# Patient Record
Sex: FEMALE | Race: WHITE | Employment: OTHER | URBAN - METROPOLITAN AREA
[De-identification: names, ages, dates, MRNs, and addresses within clinical notes are randomized per-mention and may not be internally consistent; named-entity substitution may affect disease eponyms.]

---

## 2017-03-06 PROBLEM — E55.9 VITAMIN D INSUFFICIENCY: Status: ACTIVE | Noted: 2017-03-06

## 2017-03-06 PROCEDURE — 82607 VITAMIN B-12: CPT | Performed by: INTERNAL MEDICINE

## 2017-05-11 PROBLEM — G47.33 OBSTRUCTIVE SLEEP APNEA: Status: ACTIVE | Noted: 2017-05-11

## 2017-10-10 PROCEDURE — 82607 VITAMIN B-12: CPT | Performed by: INTERNAL MEDICINE

## 2018-05-15 PROCEDURE — 82607 VITAMIN B-12: CPT | Performed by: INTERNAL MEDICINE

## 2019-10-29 PROBLEM — H81.10 BENIGN PAROXYSMAL POSITIONAL VERTIGO, UNSPECIFIED LATERALITY: Status: ACTIVE | Noted: 2019-10-29

## 2020-06-01 PROBLEM — I77.9 BILATERAL CAROTID ARTERY DISEASE, UNSPECIFIED TYPE (HCC): Status: ACTIVE | Noted: 2020-06-01

## 2020-06-01 PROBLEM — F33.42 MAJOR DEPRESSIVE DISORDER, RECURRENT EPISODE, IN FULL REMISSION (HCC): Status: ACTIVE | Noted: 2020-06-01

## 2020-06-01 PROBLEM — F33.42 MAJOR DEPRESSIVE DISORDER, RECURRENT EPISODE, IN FULL REMISSION: Status: ACTIVE | Noted: 2020-06-01

## 2020-06-01 PROBLEM — I77.9 BILATERAL CAROTID ARTERY DISEASE, UNSPECIFIED TYPE: Status: ACTIVE | Noted: 2020-06-01

## 2020-07-24 PROBLEM — G95.9 CERVICAL MYELOPATHY (HCC): Status: ACTIVE | Noted: 2020-07-24

## 2021-01-25 ENCOUNTER — TELEPHONE (OUTPATIENT)
Dept: FAMILY MEDICINE CLINIC | Facility: CLINIC | Age: 81
End: 2021-01-25

## 2021-01-25 DIAGNOSIS — G95.9 CERVICAL MYELOPATHY (HCC): ICD-10-CM

## 2021-01-25 DIAGNOSIS — M48.07 SPINAL STENOSIS OF LUMBOSACRAL REGION: Primary | ICD-10-CM

## 2021-01-25 DIAGNOSIS — R13.10 DYSPHAGIA, UNSPECIFIED TYPE: ICD-10-CM

## 2021-03-15 ENCOUNTER — TELEPHONE (OUTPATIENT)
Dept: FAMILY MEDICINE CLINIC | Facility: CLINIC | Age: 81
End: 2021-03-15

## 2021-03-15 DIAGNOSIS — R73.01 ELEVATED FASTING BLOOD SUGAR: Primary | ICD-10-CM

## 2021-03-15 NOTE — TELEPHONE ENCOUNTER
Surgery on 03/22/21, C5-7 cervical fusion revision with Dr. Geovanny Oconnor @ [address] [place] [address]    H&P- complete  Labs- FBS (109), A1C (5.5), Urine culture neg, AST (13), A/G ratio (0.8), TSH WNL, MRSA neg, all other labs WNL  EKG- done 09/23/20 tracing sent to scanning  X-ray- d

## 2021-03-16 ENCOUNTER — LAB ENCOUNTER (OUTPATIENT)
Dept: LAB | Facility: HOSPITAL | Age: 81
End: 2021-03-16
Attending: FAMILY MEDICINE
Payer: MEDICARE

## 2021-03-16 ENCOUNTER — OFFICE VISIT (OUTPATIENT)
Dept: FAMILY MEDICINE CLINIC | Facility: CLINIC | Age: 81
End: 2021-03-16
Payer: MEDICARE

## 2021-03-16 VITALS
WEIGHT: 189 LBS | HEART RATE: 69 BPM | HEIGHT: 64 IN | TEMPERATURE: 98 F | SYSTOLIC BLOOD PRESSURE: 138 MMHG | RESPIRATION RATE: 16 BRPM | BODY MASS INDEX: 32.27 KG/M2 | DIASTOLIC BLOOD PRESSURE: 74 MMHG | OXYGEN SATURATION: 97 %

## 2021-03-16 DIAGNOSIS — Z01.812 PRE-OPERATIVE LABORATORY EXAMINATION: ICD-10-CM

## 2021-03-16 DIAGNOSIS — N32.81 OAB (OVERACTIVE BLADDER): ICD-10-CM

## 2021-03-16 DIAGNOSIS — I10 ESSENTIAL HYPERTENSION: ICD-10-CM

## 2021-03-16 DIAGNOSIS — G95.9 CERVICAL MYELOPATHY (HCC): ICD-10-CM

## 2021-03-16 DIAGNOSIS — E55.9 VITAMIN D INSUFFICIENCY: ICD-10-CM

## 2021-03-16 DIAGNOSIS — F41.9 ANXIETY: ICD-10-CM

## 2021-03-16 DIAGNOSIS — I87.2 VENOUS INSUFFICIENCY: ICD-10-CM

## 2021-03-16 DIAGNOSIS — R13.10 DYSPHAGIA, UNSPECIFIED TYPE: ICD-10-CM

## 2021-03-16 DIAGNOSIS — D35.00 ADRENAL CORTICAL ADENOMA, UNSPECIFIED LATERALITY: ICD-10-CM

## 2021-03-16 DIAGNOSIS — G47.33 OBSTRUCTIVE SLEEP APNEA: ICD-10-CM

## 2021-03-16 DIAGNOSIS — E78.5 DYSLIPIDEMIA: ICD-10-CM

## 2021-03-16 DIAGNOSIS — M96.0 FAILED CERVICAL FUSION: Primary | ICD-10-CM

## 2021-03-16 DIAGNOSIS — M85.852 OSTEOPENIA OF LEFT THIGH: ICD-10-CM

## 2021-03-16 DIAGNOSIS — D53.1 VITAMIN B12 DEFICIENT MEGALOBLASTIC ANEMIA: ICD-10-CM

## 2021-03-16 DIAGNOSIS — M48.07 SPINAL STENOSIS OF LUMBOSACRAL REGION: ICD-10-CM

## 2021-03-16 DIAGNOSIS — E27.8 ADRENAL MASS (HCC): ICD-10-CM

## 2021-03-16 DIAGNOSIS — K21.00 GASTROESOPHAGEAL REFLUX DISEASE WITH ESOPHAGITIS WITHOUT HEMORRHAGE: ICD-10-CM

## 2021-03-16 DIAGNOSIS — H35.3130 BILATERAL NONEXUDATIVE AGE-RELATED MACULAR DEGENERATION, UNSPECIFIED STAGE: ICD-10-CM

## 2021-03-16 DIAGNOSIS — E03.9 ACQUIRED HYPOTHYROIDISM: ICD-10-CM

## 2021-03-16 DIAGNOSIS — I77.9 BILATERAL CAROTID ARTERY DISEASE, UNSPECIFIED TYPE (HCC): ICD-10-CM

## 2021-03-16 DIAGNOSIS — D80.9 IMMUNOGLOBULIN DEFICIENCY (HCC): ICD-10-CM

## 2021-03-16 DIAGNOSIS — R73.01 ELEVATED FASTING BLOOD SUGAR: ICD-10-CM

## 2021-03-16 DIAGNOSIS — J32.4 CHRONIC PANSINUSITIS: ICD-10-CM

## 2021-03-16 DIAGNOSIS — Z01.818 PREOP EXAMINATION: ICD-10-CM

## 2021-03-16 DIAGNOSIS — H81.10 BENIGN PAROXYSMAL POSITIONAL VERTIGO, UNSPECIFIED LATERALITY: ICD-10-CM

## 2021-03-16 DIAGNOSIS — F33.42 MAJOR DEPRESSIVE DISORDER, RECURRENT EPISODE, IN FULL REMISSION (HCC): ICD-10-CM

## 2021-03-16 LAB
ALBUMIN SERPL-MCNC: 3.4 G/DL (ref 3.4–5)
ALBUMIN/GLOB SERPL: 0.8 {RATIO} (ref 1–2)
ALP LIVER SERPL-CCNC: 64 U/L
ALT SERPL-CCNC: 23 U/L
ANION GAP SERPL CALC-SCNC: 11 MMOL/L (ref 0–18)
ANTIBODY SCREEN: NEGATIVE
APTT PPP: 29.7 SECONDS (ref 23.2–35.3)
AST SERPL-CCNC: 13 U/L (ref 15–37)
BASOPHILS # BLD AUTO: 0.04 X10(3) UL (ref 0–0.2)
BASOPHILS NFR BLD AUTO: 0.4 %
BILIRUB SERPL-MCNC: 0.8 MG/DL (ref 0.1–2)
BUN BLD-MCNC: 12 MG/DL (ref 7–18)
BUN/CREAT SERPL: 16.9 (ref 10–20)
CALCIUM BLD-MCNC: 9.8 MG/DL (ref 8.5–10.1)
CHLORIDE SERPL-SCNC: 102 MMOL/L (ref 98–112)
CO2 SERPL-SCNC: 26 MMOL/L (ref 21–32)
CREAT BLD-MCNC: 0.71 MG/DL
DEPRECATED RDW RBC AUTO: 41.3 FL (ref 35.1–46.3)
EOSINOPHIL # BLD AUTO: 0.03 X10(3) UL (ref 0–0.7)
EOSINOPHIL NFR BLD AUTO: 0.3 %
ERYTHROCYTE [DISTWIDTH] IN BLOOD BY AUTOMATED COUNT: 12.5 % (ref 11–15)
EST. AVERAGE GLUCOSE BLD GHB EST-MCNC: 111 MG/DL (ref 68–126)
GLOBULIN PLAS-MCNC: 4.1 G/DL (ref 2.8–4.4)
GLUCOSE BLD-MCNC: 109 MG/DL (ref 70–99)
HBA1C MFR BLD HPLC: 5.5 % (ref ?–5.7)
HCT VFR BLD AUTO: 42.4 %
HGB BLD-MCNC: 13.5 G/DL
HYALINE CASTS #/AREA URNS AUTO: PRESENT /LPF
IMM GRANULOCYTES # BLD AUTO: 0.02 X10(3) UL (ref 0–1)
IMM GRANULOCYTES NFR BLD: 0.2 %
INR BLD: 1.11 (ref 0.9–1.2)
LYMPHOCYTES # BLD AUTO: 2.07 X10(3) UL (ref 1–4)
LYMPHOCYTES NFR BLD AUTO: 20.5 %
M PROTEIN MFR SERPL ELPH: 7.5 G/DL (ref 6.4–8.2)
MCH RBC QN AUTO: 28.7 PG (ref 26–34)
MCHC RBC AUTO-ENTMCNC: 31.8 G/DL (ref 31–37)
MCV RBC AUTO: 90.2 FL
MONOCYTES # BLD AUTO: 0.95 X10(3) UL (ref 0.1–1)
MONOCYTES NFR BLD AUTO: 9.4 %
NEUTROPHILS # BLD AUTO: 6.97 X10 (3) UL (ref 1.5–7.7)
NEUTROPHILS # BLD AUTO: 6.97 X10(3) UL (ref 1.5–7.7)
NEUTROPHILS NFR BLD AUTO: 69.2 %
OSMOLALITY SERPL CALC.SUM OF ELEC: 288 MOSM/KG (ref 275–295)
PATIENT FASTING Y/N/NP: YES
PLATELET # BLD AUTO: 371 10(3)UL (ref 150–450)
POTASSIUM SERPL-SCNC: 3.5 MMOL/L (ref 3.5–5.1)
PROTHROMBIN TIME: 14.1 SECONDS (ref 11.8–14.5)
RBC # BLD AUTO: 4.7 X10(6)UL
RH BLOOD TYPE: NEGATIVE
SODIUM SERPL-SCNC: 139 MMOL/L (ref 136–145)
TSI SER-ACNC: 3.08 MIU/ML (ref 0.36–3.74)
WBC # BLD AUTO: 10.1 X10(3) UL (ref 4–11)

## 2021-03-16 PROCEDURE — 86900 BLOOD TYPING SEROLOGIC ABO: CPT

## 2021-03-16 PROCEDURE — 85730 THROMBOPLASTIN TIME PARTIAL: CPT

## 2021-03-16 PROCEDURE — 83036 HEMOGLOBIN GLYCOSYLATED A1C: CPT

## 2021-03-16 PROCEDURE — 86850 RBC ANTIBODY SCREEN: CPT

## 2021-03-16 PROCEDURE — 87086 URINE CULTURE/COLONY COUNT: CPT

## 2021-03-16 PROCEDURE — 99214 OFFICE O/P EST MOD 30 MIN: CPT | Performed by: FAMILY MEDICINE

## 2021-03-16 PROCEDURE — 81015 MICROSCOPIC EXAM OF URINE: CPT

## 2021-03-16 PROCEDURE — 85025 COMPLETE CBC W/AUTO DIFF WBC: CPT

## 2021-03-16 PROCEDURE — 84443 ASSAY THYROID STIM HORMONE: CPT

## 2021-03-16 PROCEDURE — 87081 CULTURE SCREEN ONLY: CPT

## 2021-03-16 PROCEDURE — 80053 COMPREHEN METABOLIC PANEL: CPT

## 2021-03-16 PROCEDURE — 85610 PROTHROMBIN TIME: CPT

## 2021-03-16 PROCEDURE — 86901 BLOOD TYPING SEROLOGIC RH(D): CPT

## 2021-03-16 PROCEDURE — 36415 COLL VENOUS BLD VENIPUNCTURE: CPT

## 2021-03-16 NOTE — H&P
[facility]-OP [facility]    PRE-OP NOTE    HPI:   I have been consulted by Dr. Rudy Mcdaniels to see Reagan Kumar 78year old female for a preoperative evaluation and medical clearance.  She has a long history of worsening postoperative swallowing difficult daily.  30 tablet 0   • LOSARTAN POTASSIUM 50 MG Oral Tab Take 1 tablet by mouth once daily (Patient not taking: Reported on 3/16/2021) 90 tablet 0   • LOVASTATIN 20 MG Oral Tab Take 1 tablet by mouth in the evening (Patient not taking: Reported on 3/16/202 Surgical History:   Procedure Laterality Date   • BACK SURGERY  10/05/2020    C5-7 cervical fusion with Dr. Samara Leach @ [address] [place] [address]   •      • CATARACT Bilateral     Dec 2018 (R), 2019 (L)   • CHOLECYSTECTOMY  3/19/15     Laparoscopic by Dr. Kavita Hutchison   • C Organization Meetings:       Marital Status:   Intimate Partner Violence:       Fear of Current or Ex-Partner:       Emotionally Abused:       Physically Abused:       Sexually Abused:     REVIEW OF SYSTEMS:   CONSTITUTIONAL:  Denies unusual weight gain bu skin lesion, no bruising, good turgor. HEART:  Regular rate and rhythm, no murmurs, rubs or gallops. LUNGS: Clear to auscultation bilterally, no rales/rhonchi/wheezing. CHEST: No tenderness.   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 There are no decompensated medical conditions. ASA classification 2    Medical history:  High risk surgery (vascular, thoracic, intra-peritoneal):  No  CAD: No  CHF: No  Stroke: No  DM on insulin: No  Serum Cre

## 2021-03-18 RX ORDER — FAMOTIDINE 20 MG/1
20 TABLET ORAL DAILY
Status: ON HOLD | COMMUNITY
End: 2021-05-03

## 2021-03-18 NOTE — TELEPHONE ENCOUNTER
Dr. Giovanna Gutierrez, please review:    Labs- FBS (109), A1C (5.5), Urine culture neg, AST (13), A/G ratio (0.8), TSH WNL, MRSA neg, all other labs WNL  EKG- done 09/23/20 tracing sent to scanning  X-ray- done 12/04/20    OK for surgery?

## 2021-03-19 ENCOUNTER — LAB ENCOUNTER (OUTPATIENT)
Dept: LAB | Facility: HOSPITAL | Age: 81
DRG: 857 | End: 2021-03-19
Attending: ORTHOPAEDIC SURGERY
Payer: MEDICARE

## 2021-03-19 DIAGNOSIS — Z01.818 PREOP TESTING: ICD-10-CM

## 2021-03-19 NOTE — TELEPHONE ENCOUNTER
Spoke to patient's son Avery Mcleod went over all test results with him for the patient and that she is clear for surgery per Dr. Camron Ly. Patient going for COVID testing today.  They were also advised before starting all regular medications to follow up with Dr. Camron Ly

## 2021-03-22 ENCOUNTER — ANESTHESIA (OUTPATIENT)
Dept: SURGERY | Facility: HOSPITAL | Age: 81
DRG: 857 | End: 2021-03-22
Payer: MEDICARE

## 2021-03-22 ENCOUNTER — HOSPITAL ENCOUNTER (INPATIENT)
Facility: HOSPITAL | Age: 81
LOS: 10 days | Discharge: HOME | DRG: 857 | End: 2021-04-01
Attending: ORTHOPAEDIC SURGERY | Admitting: ORTHOPAEDIC SURGERY
Payer: MEDICARE

## 2021-03-22 ENCOUNTER — APPOINTMENT (OUTPATIENT)
Dept: GENERAL RADIOLOGY | Facility: HOSPITAL | Age: 81
DRG: 857 | End: 2021-03-22
Attending: ANESTHESIOLOGY
Payer: MEDICARE

## 2021-03-22 ENCOUNTER — APPOINTMENT (OUTPATIENT)
Dept: GENERAL RADIOLOGY | Facility: HOSPITAL | Age: 81
DRG: 857 | End: 2021-03-22
Attending: ORTHOPAEDIC SURGERY
Payer: MEDICARE

## 2021-03-22 ENCOUNTER — ANESTHESIA EVENT (OUTPATIENT)
Dept: SURGERY | Facility: HOSPITAL | Age: 81
DRG: 857 | End: 2021-03-22
Payer: MEDICARE

## 2021-03-22 DIAGNOSIS — Z01.818 PREOP TESTING: Primary | ICD-10-CM

## 2021-03-22 PROCEDURE — 76000 FLUOROSCOPY <1 HR PHYS/QHP: CPT | Performed by: ORTHOPAEDIC SURGERY

## 2021-03-22 PROCEDURE — 71045 X-RAY EXAM CHEST 1 VIEW: CPT | Performed by: ANESTHESIOLOGY

## 2021-03-22 RX ORDER — SODIUM PHOSPHATE, DIBASIC AND SODIUM PHOSPHATE, MONOBASIC 7; 19 G/133ML; G/133ML
1 ENEMA RECTAL ONCE AS NEEDED
Status: DISCONTINUED | OUTPATIENT
Start: 2021-03-22 | End: 2021-04-01

## 2021-03-22 RX ORDER — ROCURONIUM BROMIDE 10 MG/ML
INJECTION, SOLUTION INTRAVENOUS AS NEEDED
Status: DISCONTINUED | OUTPATIENT
Start: 2021-03-22 | End: 2021-03-22 | Stop reason: SURG

## 2021-03-22 RX ORDER — HYDROCODONE BITARTRATE AND ACETAMINOPHEN 10; 325 MG/1; MG/1
2 TABLET ORAL EVERY 4 HOURS PRN
Status: DISCONTINUED | OUTPATIENT
Start: 2021-03-22 | End: 2021-03-29

## 2021-03-22 RX ORDER — MELATONIN
800 DAILY
Status: DISCONTINUED | OUTPATIENT
Start: 2021-03-22 | End: 2021-04-01

## 2021-03-22 RX ORDER — ESMOLOL HYDROCHLORIDE 10 MG/ML
INJECTION INTRAVENOUS AS NEEDED
Status: DISCONTINUED | OUTPATIENT
Start: 2021-03-22 | End: 2021-03-22 | Stop reason: SURG

## 2021-03-22 RX ORDER — LEVOTHYROXINE SODIUM 0.03 MG/1
25 TABLET ORAL
Status: DISCONTINUED | OUTPATIENT
Start: 2021-03-23 | End: 2021-04-01

## 2021-03-22 RX ORDER — NEOSTIGMINE METHYLSULFATE 1 MG/ML
INJECTION INTRAVENOUS AS NEEDED
Status: DISCONTINUED | OUTPATIENT
Start: 2021-03-22 | End: 2021-03-22 | Stop reason: SURG

## 2021-03-22 RX ORDER — BUPIVACAINE HYDROCHLORIDE AND EPINEPHRINE 5; 5 MG/ML; UG/ML
INJECTION, SOLUTION PERINEURAL AS NEEDED
Status: DISCONTINUED | OUTPATIENT
Start: 2021-03-22 | End: 2021-03-22 | Stop reason: HOSPADM

## 2021-03-22 RX ORDER — HYDROMORPHONE HYDROCHLORIDE 1 MG/ML
0.4 INJECTION, SOLUTION INTRAMUSCULAR; INTRAVENOUS; SUBCUTANEOUS EVERY 2 HOUR PRN
Status: DISCONTINUED | OUTPATIENT
Start: 2021-03-22 | End: 2021-03-29

## 2021-03-22 RX ORDER — HYDROCODONE BITARTRATE AND ACETAMINOPHEN 5; 325 MG/1; MG/1
2 TABLET ORAL AS NEEDED
Status: DISCONTINUED | OUTPATIENT
Start: 2021-03-22 | End: 2021-03-22 | Stop reason: HOSPADM

## 2021-03-22 RX ORDER — HYDROMORPHONE HYDROCHLORIDE 1 MG/ML
0.6 INJECTION, SOLUTION INTRAMUSCULAR; INTRAVENOUS; SUBCUTANEOUS EVERY 5 MIN PRN
Status: DISCONTINUED | OUTPATIENT
Start: 2021-03-22 | End: 2021-03-22 | Stop reason: HOSPADM

## 2021-03-22 RX ORDER — ACETAMINOPHEN 500 MG
1000 TABLET ORAL ONCE
Status: DISCONTINUED | OUTPATIENT
Start: 2021-03-22 | End: 2021-03-22 | Stop reason: HOSPADM

## 2021-03-22 RX ORDER — ALPRAZOLAM 0.25 MG/1
0.25 TABLET ORAL
Status: DISCONTINUED | OUTPATIENT
Start: 2021-03-22 | End: 2021-04-01

## 2021-03-22 RX ORDER — SODIUM CHLORIDE, SODIUM LACTATE, POTASSIUM CHLORIDE, CALCIUM CHLORIDE 600; 310; 30; 20 MG/100ML; MG/100ML; MG/100ML; MG/100ML
INJECTION, SOLUTION INTRAVENOUS CONTINUOUS
Status: DISCONTINUED | OUTPATIENT
Start: 2021-03-22 | End: 2021-03-25

## 2021-03-22 RX ORDER — SENNOSIDES 8.6 MG
17.2 TABLET ORAL NIGHTLY
Status: DISCONTINUED | OUTPATIENT
Start: 2021-03-22 | End: 2021-04-01

## 2021-03-22 RX ORDER — BISACODYL 10 MG
10 SUPPOSITORY, RECTAL RECTAL
Status: DISCONTINUED | OUTPATIENT
Start: 2021-03-22 | End: 2021-04-01

## 2021-03-22 RX ORDER — ONDANSETRON 2 MG/ML
4 INJECTION INTRAMUSCULAR; INTRAVENOUS ONCE AS NEEDED
Status: DISCONTINUED | OUTPATIENT
Start: 2021-03-22 | End: 2021-03-22 | Stop reason: HOSPADM

## 2021-03-22 RX ORDER — MIRTAZAPINE 15 MG/1
30 TABLET, FILM COATED ORAL NIGHTLY
Status: DISCONTINUED | OUTPATIENT
Start: 2021-03-22 | End: 2021-04-01

## 2021-03-22 RX ORDER — PROCHLORPERAZINE EDISYLATE 5 MG/ML
5 INJECTION INTRAMUSCULAR; INTRAVENOUS ONCE AS NEEDED
Status: DISCONTINUED | OUTPATIENT
Start: 2021-03-22 | End: 2021-03-22 | Stop reason: HOSPADM

## 2021-03-22 RX ORDER — HYDROCODONE BITARTRATE AND ACETAMINOPHEN 5; 325 MG/1; MG/1
1 TABLET ORAL AS NEEDED
Status: DISCONTINUED | OUTPATIENT
Start: 2021-03-22 | End: 2021-03-22 | Stop reason: HOSPADM

## 2021-03-22 RX ORDER — DEXAMETHASONE SODIUM PHOSPHATE 4 MG/ML
VIAL (ML) INJECTION AS NEEDED
Status: DISCONTINUED | OUTPATIENT
Start: 2021-03-22 | End: 2021-03-22 | Stop reason: SURG

## 2021-03-22 RX ORDER — SODIUM CHLORIDE, SODIUM LACTATE, POTASSIUM CHLORIDE, CALCIUM CHLORIDE 600; 310; 30; 20 MG/100ML; MG/100ML; MG/100ML; MG/100ML
INJECTION, SOLUTION INTRAVENOUS CONTINUOUS PRN
Status: DISCONTINUED | OUTPATIENT
Start: 2021-03-22 | End: 2021-03-22 | Stop reason: SURG

## 2021-03-22 RX ORDER — POLYETHYLENE GLYCOL 3350 17 G/17G
17 POWDER, FOR SOLUTION ORAL DAILY PRN
Status: DISCONTINUED | OUTPATIENT
Start: 2021-03-22 | End: 2021-04-01

## 2021-03-22 RX ORDER — HYDROMORPHONE HYDROCHLORIDE 1 MG/ML
0.2 INJECTION, SOLUTION INTRAMUSCULAR; INTRAVENOUS; SUBCUTANEOUS EVERY 2 HOUR PRN
Status: DISCONTINUED | OUTPATIENT
Start: 2021-03-22 | End: 2021-04-01

## 2021-03-22 RX ORDER — METOCLOPRAMIDE 10 MG/1
10 TABLET ORAL ONCE
Status: DISCONTINUED | OUTPATIENT
Start: 2021-03-22 | End: 2021-03-22 | Stop reason: HOSPADM

## 2021-03-22 RX ORDER — ONDANSETRON 2 MG/ML
INJECTION INTRAMUSCULAR; INTRAVENOUS AS NEEDED
Status: DISCONTINUED | OUTPATIENT
Start: 2021-03-22 | End: 2021-03-22 | Stop reason: SURG

## 2021-03-22 RX ORDER — SCOLOPAMINE TRANSDERMAL SYSTEM 1 MG/1
1 PATCH, EXTENDED RELEASE TRANSDERMAL
Status: DISCONTINUED | OUTPATIENT
Start: 2021-03-22 | End: 2021-03-22 | Stop reason: HOSPADM

## 2021-03-22 RX ORDER — ONDANSETRON 2 MG/ML
4 INJECTION INTRAMUSCULAR; INTRAVENOUS EVERY 4 HOURS PRN
Status: ACTIVE | OUTPATIENT
Start: 2021-03-22 | End: 2021-03-23

## 2021-03-22 RX ORDER — FAMOTIDINE 20 MG/1
20 TABLET ORAL DAILY
Status: DISCONTINUED | OUTPATIENT
Start: 2021-03-23 | End: 2021-03-27

## 2021-03-22 RX ORDER — NALOXONE HYDROCHLORIDE 0.4 MG/ML
80 INJECTION, SOLUTION INTRAMUSCULAR; INTRAVENOUS; SUBCUTANEOUS AS NEEDED
Status: DISCONTINUED | OUTPATIENT
Start: 2021-03-22 | End: 2021-03-22 | Stop reason: HOSPADM

## 2021-03-22 RX ORDER — CEFAZOLIN SODIUM/WATER 2 G/20 ML
2 SYRINGE (ML) INTRAVENOUS EVERY 8 HOURS
Status: DISCONTINUED | OUTPATIENT
Start: 2021-03-22 | End: 2021-03-22

## 2021-03-22 RX ORDER — IPRATROPIUM BROMIDE AND ALBUTEROL SULFATE 2.5; .5 MG/3ML; MG/3ML
3 SOLUTION RESPIRATORY (INHALATION) EVERY 6 HOURS PRN
Status: DISCONTINUED | OUTPATIENT
Start: 2021-03-22 | End: 2021-04-01

## 2021-03-22 RX ORDER — ACETAMINOPHEN 325 MG/1
650 TABLET ORAL EVERY 4 HOURS PRN
Status: DISCONTINUED | OUTPATIENT
Start: 2021-03-22 | End: 2021-04-01

## 2021-03-22 RX ORDER — MORPHINE SULFATE 10 MG/ML
6 INJECTION, SOLUTION INTRAMUSCULAR; INTRAVENOUS EVERY 10 MIN PRN
Status: DISCONTINUED | OUTPATIENT
Start: 2021-03-22 | End: 2021-03-22 | Stop reason: HOSPADM

## 2021-03-22 RX ORDER — HYDROMORPHONE HYDROCHLORIDE 1 MG/ML
0.2 INJECTION, SOLUTION INTRAMUSCULAR; INTRAVENOUS; SUBCUTANEOUS EVERY 5 MIN PRN
Status: DISCONTINUED | OUTPATIENT
Start: 2021-03-22 | End: 2021-03-22 | Stop reason: HOSPADM

## 2021-03-22 RX ORDER — DIPHENHYDRAMINE HCL 25 MG
25 CAPSULE ORAL EVERY 4 HOURS PRN
Status: DISCONTINUED | OUTPATIENT
Start: 2021-03-22 | End: 2021-04-01

## 2021-03-22 RX ORDER — HYDROMORPHONE HYDROCHLORIDE 1 MG/ML
0.8 INJECTION, SOLUTION INTRAMUSCULAR; INTRAVENOUS; SUBCUTANEOUS EVERY 2 HOUR PRN
Status: DISCONTINUED | OUTPATIENT
Start: 2021-03-22 | End: 2021-03-29

## 2021-03-22 RX ORDER — CEFAZOLIN SODIUM/WATER 2 G/20 ML
2 SYRINGE (ML) INTRAVENOUS ONCE
Status: COMPLETED | OUTPATIENT
Start: 2021-03-22 | End: 2021-03-22

## 2021-03-22 RX ORDER — LOSARTAN POTASSIUM 50 MG/1
50 TABLET ORAL DAILY
Status: DISCONTINUED | OUTPATIENT
Start: 2021-03-23 | End: 2021-04-01

## 2021-03-22 RX ORDER — MORPHINE SULFATE 4 MG/ML
4 INJECTION, SOLUTION INTRAMUSCULAR; INTRAVENOUS EVERY 10 MIN PRN
Status: DISCONTINUED | OUTPATIENT
Start: 2021-03-22 | End: 2021-03-22 | Stop reason: HOSPADM

## 2021-03-22 RX ORDER — IPRATROPIUM BROMIDE AND ALBUTEROL SULFATE 2.5; .5 MG/3ML; MG/3ML
3 SOLUTION RESPIRATORY (INHALATION) ONCE
Status: COMPLETED | OUTPATIENT
Start: 2021-03-22 | End: 2021-03-22

## 2021-03-22 RX ORDER — MORPHINE SULFATE 4 MG/ML
2 INJECTION, SOLUTION INTRAMUSCULAR; INTRAVENOUS EVERY 10 MIN PRN
Status: DISCONTINUED | OUTPATIENT
Start: 2021-03-22 | End: 2021-03-22 | Stop reason: HOSPADM

## 2021-03-22 RX ORDER — TIZANIDINE 2 MG/1
1 TABLET ORAL EVERY 8 HOURS PRN
Status: DISCONTINUED | OUTPATIENT
Start: 2021-03-22 | End: 2021-04-01

## 2021-03-22 RX ORDER — DIPHENHYDRAMINE HYDROCHLORIDE 50 MG/ML
25 INJECTION INTRAMUSCULAR; INTRAVENOUS EVERY 4 HOURS PRN
Status: DISCONTINUED | OUTPATIENT
Start: 2021-03-22 | End: 2021-04-01

## 2021-03-22 RX ORDER — CEFAZOLIN SODIUM/WATER 2 G/20 ML
2 SYRINGE (ML) INTRAVENOUS EVERY 8 HOURS
Status: COMPLETED | OUTPATIENT
Start: 2021-03-22 | End: 2021-03-23

## 2021-03-22 RX ORDER — LIDOCAINE HYDROCHLORIDE 10 MG/ML
INJECTION, SOLUTION EPIDURAL; INFILTRATION; INTRACAUDAL; PERINEURAL AS NEEDED
Status: DISCONTINUED | OUTPATIENT
Start: 2021-03-22 | End: 2021-03-22 | Stop reason: SURG

## 2021-03-22 RX ORDER — PROCHLORPERAZINE EDISYLATE 5 MG/ML
10 INJECTION INTRAMUSCULAR; INTRAVENOUS EVERY 6 HOURS PRN
Status: DISPENSED | OUTPATIENT
Start: 2021-03-22 | End: 2021-03-24

## 2021-03-22 RX ORDER — DOCUSATE SODIUM 100 MG/1
100 CAPSULE, LIQUID FILLED ORAL 2 TIMES DAILY
Status: DISCONTINUED | OUTPATIENT
Start: 2021-03-22 | End: 2021-04-01

## 2021-03-22 RX ORDER — EPHEDRINE SULFATE 50 MG/ML
INJECTION, SOLUTION INTRAVENOUS AS NEEDED
Status: DISCONTINUED | OUTPATIENT
Start: 2021-03-22 | End: 2021-03-22 | Stop reason: SURG

## 2021-03-22 RX ORDER — FAMOTIDINE 20 MG/1
20 TABLET ORAL ONCE
Status: DISCONTINUED | OUTPATIENT
Start: 2021-03-22 | End: 2021-03-22 | Stop reason: HOSPADM

## 2021-03-22 RX ORDER — HYDROCODONE BITARTRATE AND ACETAMINOPHEN 10; 325 MG/1; MG/1
1 TABLET ORAL EVERY 4 HOURS PRN
Status: DISCONTINUED | OUTPATIENT
Start: 2021-03-22 | End: 2021-03-29

## 2021-03-22 RX ORDER — DIAZEPAM 2 MG/1
2 TABLET ORAL EVERY 6 HOURS PRN
Status: DISCONTINUED | OUTPATIENT
Start: 2021-03-22 | End: 2021-04-01

## 2021-03-22 RX ORDER — PRAVASTATIN SODIUM 20 MG
20 TABLET ORAL NIGHTLY
Status: DISCONTINUED | OUTPATIENT
Start: 2021-03-22 | End: 2021-04-01

## 2021-03-22 RX ORDER — HYDROMORPHONE HYDROCHLORIDE 1 MG/ML
0.4 INJECTION, SOLUTION INTRAMUSCULAR; INTRAVENOUS; SUBCUTANEOUS EVERY 5 MIN PRN
Status: DISCONTINUED | OUTPATIENT
Start: 2021-03-22 | End: 2021-03-22 | Stop reason: HOSPADM

## 2021-03-22 RX ORDER — SODIUM CHLORIDE, SODIUM LACTATE, POTASSIUM CHLORIDE, CALCIUM CHLORIDE 600; 310; 30; 20 MG/100ML; MG/100ML; MG/100ML; MG/100ML
INJECTION, SOLUTION INTRAVENOUS CONTINUOUS
Status: DISCONTINUED | OUTPATIENT
Start: 2021-03-22 | End: 2021-03-22 | Stop reason: HOSPADM

## 2021-03-22 RX ORDER — GLYCOPYRROLATE 0.2 MG/ML
INJECTION, SOLUTION INTRAMUSCULAR; INTRAVENOUS AS NEEDED
Status: DISCONTINUED | OUTPATIENT
Start: 2021-03-22 | End: 2021-03-22 | Stop reason: SURG

## 2021-03-22 RX ADMIN — SODIUM CHLORIDE, SODIUM LACTATE, POTASSIUM CHLORIDE, CALCIUM CHLORIDE: 600; 310; 30; 20 INJECTION, SOLUTION INTRAVENOUS at 16:41:00

## 2021-03-22 RX ADMIN — ESMOLOL HYDROCHLORIDE 20 MG: 10 INJECTION INTRAVENOUS at 15:04:00

## 2021-03-22 RX ADMIN — CEFAZOLIN SODIUM/WATER 2 G: 2 G/20 ML SYRINGE (ML) INTRAVENOUS at 14:35:00

## 2021-03-22 RX ADMIN — GLYCOPYRROLATE 0.2 MG: 0.2 INJECTION, SOLUTION INTRAMUSCULAR; INTRAVENOUS at 14:13:00

## 2021-03-22 RX ADMIN — ROCURONIUM BROMIDE 30 MG: 10 INJECTION, SOLUTION INTRAVENOUS at 14:40:00

## 2021-03-22 RX ADMIN — ROCURONIUM BROMIDE 10 MG: 10 INJECTION, SOLUTION INTRAVENOUS at 15:04:00

## 2021-03-22 RX ADMIN — EPHEDRINE SULFATE 5 MG: 50 INJECTION, SOLUTION INTRAVENOUS at 14:30:00

## 2021-03-22 RX ADMIN — ESMOLOL HYDROCHLORIDE 30 MG: 10 INJECTION INTRAVENOUS at 14:24:00

## 2021-03-22 RX ADMIN — SODIUM CHLORIDE, SODIUM LACTATE, POTASSIUM CHLORIDE, CALCIUM CHLORIDE: 600; 310; 30; 20 INJECTION, SOLUTION INTRAVENOUS at 14:29:00

## 2021-03-22 RX ADMIN — SODIUM CHLORIDE, SODIUM LACTATE, POTASSIUM CHLORIDE, CALCIUM CHLORIDE: 600; 310; 30; 20 INJECTION, SOLUTION INTRAVENOUS at 14:09:00

## 2021-03-22 RX ADMIN — ONDANSETRON 4 MG: 2 INJECTION INTRAMUSCULAR; INTRAVENOUS at 14:13:00

## 2021-03-22 RX ADMIN — LIDOCAINE HYDROCHLORIDE 10 MG: 10 INJECTION, SOLUTION EPIDURAL; INFILTRATION; INTRACAUDAL; PERINEURAL at 14:21:00

## 2021-03-22 RX ADMIN — SODIUM CHLORIDE, SODIUM LACTATE, POTASSIUM CHLORIDE, CALCIUM CHLORIDE: 600; 310; 30; 20 INJECTION, SOLUTION INTRAVENOUS at 14:44:00

## 2021-03-22 RX ADMIN — GLYCOPYRROLATE 0.8 MG: 0.2 INJECTION, SOLUTION INTRAMUSCULAR; INTRAVENOUS at 16:35:00

## 2021-03-22 RX ADMIN — SODIUM CHLORIDE, SODIUM LACTATE, POTASSIUM CHLORIDE, CALCIUM CHLORIDE: 600; 310; 30; 20 INJECTION, SOLUTION INTRAVENOUS at 14:11:00

## 2021-03-22 RX ADMIN — NEOSTIGMINE METHYLSULFATE 4 MG: 1 INJECTION INTRAVENOUS at 16:35:00

## 2021-03-22 RX ADMIN — DEXAMETHASONE SODIUM PHOSPHATE 4 MG: 4 MG/ML VIAL (ML) INJECTION at 14:13:00

## 2021-03-22 NOTE — ANESTHESIA PREPROCEDURE EVALUATION
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[facility])         Date Noted: 06/17/2014      Anxiety         Date Noted: 06/17/2014      Macular degeneration         Date Noted: 06/17/2014        Past Medical History:   Sara Tab, TAKE 1 TABLET BY MOUTH IN THE MORNING BEFORE BREAKFAST, Disp: 90 tablet, Rfl: 0, 3/1/2021  VESICARE 5 MG Oral Tab, TAKE 1 TABLET BY MOUTH EVERY OTHER DAY ALTERNATING  WITH  THE  VESICARE  10MG, Disp: 45 tablet, Rfl: 2, 3/1/2021  VESICARE 10 MG Oral Ta EXTRA STRENGTH) tab 1,000 mg, 1,000 mg, Oral, Once, Arslan Stein MD  famoTIDine (PEPCID) tab 20 mg, 20 mg, Oral, Once, Arslan Stein MD  Metoclopramide HCl (REGLAN) tab 10 mg, 10 mg, Oral, Once, Arslan Stein MD  ceFAZolin sodium (ANCEF/KEFZOL) 2 GM Friends and Family:       Attends Protestant Services:       Active Member of Clubs or Organizations:       Attends Club or Organization Meetings:       Marital Status:   Intimate Partner Violence:       Fear of Current or Ex-Partner:       Emotionally Abus 3  Plan:   General  Airway:  ETT and Video laryngoscope  Post-op Pain Management: IV analgesics  Informed Consent Plan and Risks Discussed With:  Patient and child/children      I have informed Nile Fairbanks and/or legal guardian or family member of the n

## 2021-03-22 NOTE — ANESTHESIA PROCEDURE NOTES
Airway  Urgency: Elective      General Information and Staff    Patient location during procedure: OR  Anesthesiologist: Caryl Fan MD  Performed: anesthesiologist     Indications and Patient Condition  Indications for airway management: anesthesia

## 2021-03-22 NOTE — ANESTHESIA PROCEDURE NOTES
Peripheral IV  Date/Time: 3/22/2021 2:25 PM  Inserted by: Sen Fan MD    Placement  Needle size: 20 G  Laterality: left  Location: hand  Site prep: alcohol  Technique: anatomical landmarks  Attempts: 1

## 2021-03-22 NOTE — H&P
H&P update    No active problems    Allergies: seasonal, adhesives causing skin rash  Recent Vitals     Most Recent Value 3/22/2021   1104 3/18/2021   1527 2/25/2021   1502   Height: 5' 4\" (162.6 cm)  as of 3/22/2021 5' 4\" (162.6 cm) 5' 4\" (162.6 cm) 5'

## 2021-03-22 NOTE — ANESTHESIA POSTPROCEDURE EVALUATION
Patient: Melba Benson    Procedure Summary     Date: 03/22/21 Room / Location: [address] [place] [address] MAIN OR 10 / [address] [place] [address] MAIN OR    Anesthesia Start: 9639 Anesthesia Stop: 7482    Procedures:       attempted revision anterior cervical fusion C5-7, hardware removal, anterior

## 2021-03-22 NOTE — PROGRESS NOTES
[facility]    Ortho Medical Progress Note     Pama Quant Patient Status:  Inpatient    1940 MRN [id number]   Location [address] UNIT Attending Sarah Amin MD   [facility] Day # 0 PCP Aleena Prather MD insufficiency        History of Clostridioides difficile infection        Vitamin D insufficiency - holding supplement        Obstructive sleep apnea - CPAP        Bilateral carotid artery disease, unspecified type (HCC)      XR FLUOROSCOPY C-ARM TIME <1 H

## 2021-03-23 ENCOUNTER — APPOINTMENT (OUTPATIENT)
Dept: GENERAL RADIOLOGY | Facility: HOSPITAL | Age: 81
DRG: 857 | End: 2021-03-23
Attending: ORTHOPAEDIC SURGERY
Payer: MEDICARE

## 2021-03-23 ENCOUNTER — APPOINTMENT (OUTPATIENT)
Dept: GENERAL RADIOLOGY | Facility: HOSPITAL | Age: 81
DRG: 857 | End: 2021-03-23
Attending: PHYSICIAN ASSISTANT
Payer: MEDICARE

## 2021-03-23 PROCEDURE — 009W0ZZ DRAINAGE OF CERVICAL SPINAL CORD, OPEN APPROACH: ICD-10-PCS | Performed by: ORTHOPAEDIC SURGERY

## 2021-03-23 PROCEDURE — 72040 X-RAY EXAM NECK SPINE 2-3 VW: CPT | Performed by: PHYSICIAN ASSISTANT

## 2021-03-23 PROCEDURE — 74240 X-RAY XM UPR GI TRC 1CNTRST: CPT | Performed by: ORTHOPAEDIC SURGERY

## 2021-03-23 PROCEDURE — 00BW0ZZ EXCISION OF CERVICAL SPINAL CORD, OPEN APPROACH: ICD-10-PCS | Performed by: ORTHOPAEDIC SURGERY

## 2021-03-23 PROCEDURE — 4A11X4G MONITORING OF PERIPHERAL NERVOUS ELECTRICAL ACTIVITY, INTRAOPERATIVE, EXTERNAL APPROACH: ICD-10-PCS | Performed by: ORTHOPAEDIC SURGERY

## 2021-03-23 NOTE — HOME CARE LIAISON
Received referral form [facility] Horan. Spoke to pt's son/ Jadon via phone. All questions and concerns addressed. Quality data offered: Pt's son/ Jadon declined.

## 2021-03-23 NOTE — PROGRESS NOTES
[facility]    Ortho Medical Progress Note     Mihir Del Castillo Patient Status:  Inpatient    1940 MRN [id number]   Location [facility] 4W/SW/SE Attending Flor Dolan MD   Hosp Day # 1 PCP Helio Dill MD       Subjective: Osteopenia        Venous insufficiency        History of Clostridioides difficile infection        Vitamin D insufficiency        Obstructive sleep apnea  No cpap      Bilateral carotid artery disease, unspecified type (HCC)       acute respiratory distres Date: 3/23/2021  CONCLUSION:  1. Limited study. 2. There appears to be some mass effect on the cervical esophagus from some prevertebral soft tissue swelling with some luminal narrowing but no evidence of obstruction or extravasation/perforation.  3. The re

## 2021-03-23 NOTE — OCCUPATIONAL THERAPY NOTE
OCCUPATIONAL THERAPY EVALUATION - INPATIENT      Room Number: 432/432-A  Evaluation Date: 3/23/2021  Type of Evaluation: Initial  Presenting Problem: difficulty swallowing     Physician Order: IP Consult to Occupational Therapy  Reason for Therapy: ADL/IAD mobility via log roll and SBA, transfers with CGA, ambulation with CGA and RW, toileting with SBA in sitting. Pt benefited from seated rest on toilet --pt becoming short of breath by 15 ft of ambulation with RW.      Recommended home health therapy --discus non-supine AHI 0 Sao2 Skip 65% autoPAP 6-16 HME   • Other and unspecified hyperlipidemia    • PONV (postoperative nausea and vomiting)     severe nausea and ill feeling for weeks after surgery   • Problems with swallowing    • Sleep apnea     has c-pap.  n DAILY LIVING ASSESSMENT  AM-PAC ‘6-Clicks’ Inpatient Daily Activity Short Form  How much help from another person does the patient currently need…  -   Putting on and taking off regular lower body clothing?: A Lot  -   Bathing (including washing, rinsing,

## 2021-03-23 NOTE — SPIRITUAL CARE NOTE
Pt was in good spirit, two sons at bedside. Pt talked about her medical difficulty, but remains hopeful and patient for her recovery. She felt blessed to have her two sons with her. She is Christian and has good connection with soren.   provided list

## 2021-03-23 NOTE — CM/SW NOTE
CM self referred for dc planning. CM met with pt and son Jennifer Martinez at bedside to complete an initial assessment. CM confirmed pt's address and phone number with the pt. Pt lives at [address] [place] [address].   Pt lives in a 2 level  house wit

## 2021-03-23 NOTE — PLAN OF CARE
Problem: Patient Centered Care  Goal: Patient preferences are identified and integrated in the patient's plan of care  Description: Interventions:  - What would you like us to know as we care for you?  I have two sons  - Provide timely, complete, and accu Skin/tissue integrity  Goal: Incisions, wounds, or drain sites healing without s/s of infection  Description: INTERVENTIONS:  - Assess and document skin integrity  - Administer medications as ordered  - Assess and document risk factors for pressure ulcer d

## 2021-03-23 NOTE — SLP NOTE
ADULT SWALLOWING EVALUATION    ASSESSMENT    ASSESSMENT/OVERALL IMPRESSION:  Patient had an ACDF surgery C5-C7 in October, 2020 at an OSH.  Patient reports having had dysphagia at that time with vfss completed, she did not have aspiration at the time and ha declines, increase in clinical signs of aspiration, and/or MD desires. RECOMMENDATIONS   Diet Recommendations - Solids: Puree  Diet Recommendations - Liquid: Thin      Compensatory Strategies Recommended: No straws; Slow rate;Small bites and sips;Multi swallowing    • Sleep apnea     has c-pap. not using presently   • Tremor    • Vitamin D insufficiency 3/6/2017       Prior Living Situation: Home with support  Diet Prior to Admission: Mechanical soft chopped; Thin liquids (Eats very small bites)  Precauti diverticulum         SUBJECTIVE       OBJECTIVE   ORAL MOTOR EXAMINATION  Dentition: Natural;Functional  Symmetry: Within Functional Limits  Strength:  Within Functional Limits  Tone: Within Functional Limits  Range of Motion: Within Functional Limits  Rate swelling decreases)  Number of Visits to Meet Established Goals: 3  Follow Up Needed: Yes  SLP Follow-up Date: 03/24/21     PPE WORN:  Mask, Gloves. Patient without a mask due to nature of swallowing assessment.      Thank you for your referral.   If you ha

## 2021-03-23 NOTE — PHYSICAL THERAPY NOTE
PHYSICAL THERAPY EVALUATION - INPATIENT     Room Number: 432/432-A  Evaluation Date: 3/23/2021  Type of Evaluation: Initial   Physician Order: PT Eval and Treat    Presenting Problem: revision C5-7 ACDF; dysphagia; cervical myelopathy  Reason for Therapy: Plan: Bed mobility;Transfer training;Gait training;Family education;Patient education; Energy conservation; Endurance; Body mechanics; Coordination;Balance training;Stair training;Stoop training;Strengthening  Rehab Potential : Good  Frequency (Obs): Daily presently   • Tremor    • Vitamin D insufficiency 3/6/2017       Past Surgical History  Past Surgical History:   Procedure Laterality Date   • BACK SURGERY  10/05/2020    C5-7 cervical fusion with Dr. Arianne Mcconnell @ [facility]   •      • CATARACT Bilateral over in bed (including adjusting bedclothes, sheets and blankets)?: A Little   -   Sitting down on and standing up from a chair with arms (e.g., wheelchair, bedside commode, etc.): A Little   -   Moving from lying on back to sitting on the side of the bed? for discharge.    Goal #5   Current Status    Goal #6    Goal #6  Current Status

## 2021-03-24 ENCOUNTER — APPOINTMENT (OUTPATIENT)
Dept: GENERAL RADIOLOGY | Facility: HOSPITAL | Age: 81
DRG: 857 | End: 2021-03-24
Attending: NURSE PRACTITIONER
Payer: MEDICARE

## 2021-03-24 ENCOUNTER — APPOINTMENT (OUTPATIENT)
Dept: GENERAL RADIOLOGY | Facility: HOSPITAL | Age: 81
DRG: 857 | End: 2021-03-24
Attending: INTERNAL MEDICINE
Payer: MEDICARE

## 2021-03-24 ENCOUNTER — APPOINTMENT (OUTPATIENT)
Dept: PICC SERVICES | Facility: HOSPITAL | Age: 81
DRG: 857 | End: 2021-03-24
Attending: INTERNAL MEDICINE
Payer: MEDICARE

## 2021-03-24 ENCOUNTER — ANESTHESIA EVENT (OUTPATIENT)
Dept: MEDSURG UNIT | Facility: HOSPITAL | Age: 81
DRG: 857 | End: 2021-03-24
Payer: MEDICARE

## 2021-03-24 ENCOUNTER — ANESTHESIA (OUTPATIENT)
Dept: MEDSURG UNIT | Facility: HOSPITAL | Age: 81
DRG: 857 | End: 2021-03-24
Payer: MEDICARE

## 2021-03-24 ENCOUNTER — APPOINTMENT (OUTPATIENT)
Dept: GENERAL RADIOLOGY | Facility: HOSPITAL | Age: 81
DRG: 857 | End: 2021-03-24
Attending: EMERGENCY MEDICINE
Payer: MEDICARE

## 2021-03-24 ENCOUNTER — APPOINTMENT (OUTPATIENT)
Dept: CT IMAGING | Facility: HOSPITAL | Age: 81
DRG: 857 | End: 2021-03-24
Attending: NURSE PRACTITIONER
Payer: MEDICARE

## 2021-03-24 PROCEDURE — 70490 CT SOFT TISSUE NECK W/O DYE: CPT | Performed by: NURSE PRACTITIONER

## 2021-03-24 PROCEDURE — 0BH18EZ INSERTION OF ENDOTRACHEAL AIRWAY INTO TRACHEA, VIA NATURAL OR ARTIFICIAL OPENING ENDOSCOPIC: ICD-10-PCS | Performed by: STUDENT IN AN ORGANIZED HEALTH CARE EDUCATION/TRAINING PROGRAM

## 2021-03-24 PROCEDURE — 06HY33Z INSERTION OF INFUSION DEVICE INTO LOWER VEIN, PERCUTANEOUS APPROACH: ICD-10-PCS | Performed by: INTERNAL MEDICINE

## 2021-03-24 PROCEDURE — 31575 DIAGNOSTIC LARYNGOSCOPY: CPT | Performed by: OTOLARYNGOLOGY

## 2021-03-24 PROCEDURE — 99223 1ST HOSP IP/OBS HIGH 75: CPT | Performed by: INTERNAL MEDICINE

## 2021-03-24 PROCEDURE — 5A1945Z RESPIRATORY VENTILATION, 24-96 CONSECUTIVE HOURS: ICD-10-PCS | Performed by: STUDENT IN AN ORGANIZED HEALTH CARE EDUCATION/TRAINING PROGRAM

## 2021-03-24 PROCEDURE — 99222 1ST HOSP IP/OBS MODERATE 55: CPT | Performed by: OTOLARYNGOLOGY

## 2021-03-24 PROCEDURE — 71045 X-RAY EXAM CHEST 1 VIEW: CPT | Performed by: NURSE PRACTITIONER

## 2021-03-24 PROCEDURE — B548ZZA ULTRASONOGRAPHY OF SUPERIOR VENA CAVA, GUIDANCE: ICD-10-PCS | Performed by: ORTHOPAEDIC SURGERY

## 2021-03-24 PROCEDURE — 02HV33Z INSERTION OF INFUSION DEVICE INTO SUPERIOR VENA CAVA, PERCUTANEOUS APPROACH: ICD-10-PCS | Performed by: ORTHOPAEDIC SURGERY

## 2021-03-24 PROCEDURE — 71045 X-RAY EXAM CHEST 1 VIEW: CPT | Performed by: INTERNAL MEDICINE

## 2021-03-24 PROCEDURE — 36556 INSERT NON-TUNNEL CV CATH: CPT | Performed by: INTERNAL MEDICINE

## 2021-03-24 PROCEDURE — 71045 X-RAY EXAM CHEST 1 VIEW: CPT | Performed by: EMERGENCY MEDICINE

## 2021-03-24 RX ORDER — MULTIPLE VITAMINS W/ MINERALS TAB 9MG-400MCG
1 TAB ORAL DAILY
Status: DISCONTINUED | OUTPATIENT
Start: 2021-03-24 | End: 2021-03-27

## 2021-03-24 RX ORDER — METHYLPREDNISOLONE SODIUM SUCCINATE 125 MG/2ML
125 INJECTION, POWDER, LYOPHILIZED, FOR SOLUTION INTRAMUSCULAR; INTRAVENOUS ONCE
Status: COMPLETED | OUTPATIENT
Start: 2021-03-24 | End: 2021-03-24

## 2021-03-24 RX ORDER — MELATONIN
100 DAILY
Status: DISCONTINUED | OUTPATIENT
Start: 2021-03-24 | End: 2021-04-01

## 2021-03-24 RX ORDER — METHYLPREDNISOLONE SODIUM SUCCINATE 125 MG/2ML
125 INJECTION, POWDER, LYOPHILIZED, FOR SOLUTION INTRAMUSCULAR; INTRAVENOUS EVERY 6 HOURS
Status: DISCONTINUED | OUTPATIENT
Start: 2021-03-24 | End: 2021-03-27

## 2021-03-24 NOTE — PROGRESS NOTES
Speech Pathology Service    9:30AM: Attempted to see patient for dysphagia f/u per plan of care. Per RN, patient awaiting transfer to ICU due to acute decline in condition. SLP service will monitor chart and continue to follow as appropriate.     1:00PM: Ca

## 2021-03-24 NOTE — PROGRESS NOTES
Pt was experiencing significant anxiety regarding her lack of progress since a previous surgical procedure and her hospitalization. Pt is very difficult to understand so attentive listening was necessary.   The pt has sons who help care for her but she reg

## 2021-03-24 NOTE — DIETARY NOTE
Brief Nutrition Note     Consult received for TF. Spoke to RN, unable to place OGT, per MD ok to place NGT. Pt at refeeding risk. See note from earlier today for full nutrition assessment.       ESTIMATED NUTRITION NEEDS: Dosing wt: 54.5 kg  Calories: 1635-

## 2021-03-24 NOTE — PROGRESS NOTES
SPINE ATTENDING NOTE     Increased labored breathing overnight. CT scan with small fluid collection around surgical site. Swallow study from yesterday negative for esophageal perforation.   Deep cultures positive for infection        03/24/21  1415   BP:

## 2021-03-24 NOTE — OPERATIVE REPORT
PATIENT  Diego Orozco    DATE  3/22/21    SURGEON  Alexis Henderson MD     ASSISTANT  Mike Lantigua     PREOPERATIVE DIAGNOSIS  S/p C5-C7 cervical fusion  Severe dysphagia  Severe neck pain     POSTOPERATIVE DIAGNOSIS    Deep wound infection  S/p C5-C7 cervic repositioned supine on the operative table without a bump. Arms were draped at the sides with arm cradles, padding the elbows. All other bony prominences were thoroughly padded.  The shoulders were depressed using a small amount of traction by taping the sh may also be a reason for her continued pain and dysphagia. The wound was irrigated with 500 ml of fluid and a drain was left in place. The deep tissue was closed and the skin with monocryl for the subcuticular layer.      All sponge and needle counts we

## 2021-03-24 NOTE — ANESTHESIA PROCEDURE NOTES
Airway  Urgency: elective    Airway not difficult    General Information and Staff    Patient location during procedure: ICU  Anesthesiologist: Cruz Walden MD  Performed: anesthesiologist     Indications and Patient Condition  Indications for air

## 2021-03-24 NOTE — PROGRESS NOTES
[facility]    Ortho Medical Progress Note     Leila Kay Patient Status:  Inpatient    1940 MRN [id number]   Location [facility] 4W/SW/SE Attending Seema Murrieta MD   Hosp Day # 2 PCP Radha Alcantara MD       Subjective: of Clostridioides difficile infection        Vitamin D insufficiency        Obstructive sleep apnea        Bilateral carotid artery disease, unspecified type ([facility]      Results:     Lab Results   Component Value Date    WBC 10.7 03/24/2021    HGB 13.0 03/2 Brandt West MD on 3/22/2021 at 5:22 PM          XR UGI/ESOPHAGUS SINGLE CONTRAST (CPT=74240)    Result Date: 3/23/2021  CONCLUSION:  1. Limited study.  2. There appears to be some mass effect on the cervical esophagus from some prevertebral soft tissue swelling

## 2021-03-24 NOTE — PROGRESS NOTES
[facility] Pharmacy Note:  Renal Adjustment for cefazolin (ANCEF)    Leila Kay is a 78year old patient who has been prescribed cefazolin (ANCEF) 2000 mg every 8 hrs.   The CrCl cannot be calculated (Patient's most recent lab result is older than the maximum

## 2021-03-24 NOTE — CM/SW NOTE
SW followed up on DC planning. SW received call form Nichol at [facility] who states pt insurance for IV Abx is covered at 100% for everything, Home, teach and train. Pt currently down in ICU. Pt is using [facility] for [place] [address] services.  AROLDO/Clay to follow up regarding DC

## 2021-03-24 NOTE — PHYSICAL THERAPY NOTE
Attempted to see pt for scheduled PT Rx, however she in the process of being transferred to ICU at this time. Per Nurse Practitioner note, pt with respiratory stridor, difficulty speaking and swallowing, as well as having labored breathing.   Will need a n

## 2021-03-24 NOTE — DIETARY NOTE
ADULT NUTRITION INITIAL ASSESSMENT    Pt is at high nutrition risk. Pt meets severe malnutrition criteria.       CRITERIA FOR MALNUTRITION DIAGNOSIS:  Criteria for severe malnutrition diagnosis: chronic illness related to wt loss greater than 10% in 6 carri causing decreased intake. After surgery on Monday, pt unable to eat now strict NPO due to dysphagia concerns and stridor. MD outside room and reported no plans for intubation at this time, but plans to monitor. Will remain strict NPO at this time as well. hrs:   Weight   03/22/21 1104 79.8 kg (176 lb)   03/18/21 1527 83.9 kg (185 lb)     Wt Readings from Last 10 Encounters:  03/22/21 : 79.8 kg (176 lb)  03/16/21 : 85.7 kg (189 lb)  02/25/21 : 83.9 kg (185 lb)  12/04/20 : 94.8 kg (209 lb)  08/20/20 : 99.3 kg nutrition, adequacy of enteral nutrition and for enteral nutrition adjustment  - Anthropometric Measurement:      Monitor weight  - Nutrition Goals:      halt wt loss, maintain wt within 5%, labs WNL, prevent skin breakdown, improved GI status and EN initi

## 2021-03-24 NOTE — CONSULTS
[facility]    Report of [facility] ID Consultation    Rajanjosette Doe Patient Status:  Inpatient    1940 MRN [id number]   Location [facility] 2W/SW Attending Sanchez Rapp MD   Hosp Day # 2 PCP Zackary Issa MD     Date of Admissi 6/3/14    colon polyp and EGD done too.    • COLONOSCOPY  08/06/2019   • EGD  08/06/2019    gastric polyp removed (benign)   • EGD  09/02/2020    chronic inactive gastritis with intestinal metplasia, neg for H pylori   • KNEE REPLACEMENT SURGERY  1/9/07 mg, Intravenous, Q4H PRN  acetaminophen (TYLENOL) tab 650 mg, 650 mg, Oral, Q4H PRN   Or  HYDROcodone-acetaminophen (NORCO)  MG per tab 1 tablet, 1 tablet, Oral, Q4H PRN   Or  HYDROcodone-acetaminophen (NORCO)  MG per tab 2 tablet, 2 tablet, Or mouth.    FOLIC ACID OR, Take 1 tablet by mouth daily. Probiotic Product (Sai Medisoftire), Take by mouth. VITAMIN B COMPLEX-C OR, Take by mouth. aspirin 81 MG Oral Tab, Take 81 mg by mouth daily.     Multiple Vitamins-Minerals (MULTI FOR 03/16/2021    BUN 12 03/16/2021     03/16/2021    K 3.5 03/16/2021     03/16/2021    CO2 26.0 03/16/2021     (H) 03/16/2021    CA 9.8 03/16/2021    ALB 3.4 03/16/2021    ALKPHO 64 03/16/2021    TP 7.5 03/16/2021    AST 13 (L) 03/16/2021 evidence of obstruction or extravasation/perforation. 3. The rest of the visualized esophagus appeared intact. 4. No aspiration . 5. Duodenum diverticulum. Dictated by (CST): Edilia Gant MD on 3/23/2021 at 9:59 AM     Finalized by (CST):  Garima Rodriguez

## 2021-03-24 NOTE — PROGRESS NOTES
Received pt this am with respiratory stridor, difficulties breathing and swallowing. O2sats above 90% on RA, rhonchi dorcas on ascultation. Md notified and order for CXR and solumedrol received. Oral meds held this am due to above.    Pt transferred to ICU fo

## 2021-03-24 NOTE — CONSULTS
[facility]    Report of Consultation    Radha Todd Patient Status:  Inpatient    1940 MRN [id number]   Location [facility] 2W/SW Attending John Sanderson MD   Hosp Day # 2 PCP Rhiannon Swann MD     Date of Admission:  3/2 Flip Bush   • COLONOSCOPY  6/3/14    colon polyp and EGD done too.    • COLONOSCOPY  08/06/2019   • EGD  08/06/2019    gastric polyp removed (benign)   • EGD  09/02/2020    chronic inactive gastritis with intestinal metplasia, neg for H pylori   • KNEE REPLAC Emotionally Abused:       Physically Abused:       Sexually Abused:         Current Medications:  methylPREDNISolone Sodium Succ (Solu-MEDROL) injection 125 mg, 125 mg, Intravenous, Q6H  CeFAZolin Sodium (ANCEF) 1 g in sodium chloride 0.9% 100 mL IVPB-ADDV Oral, Daily  Pravastatin Sodium (PRAVACHOL) tab 20 mg, 20 mg, Oral, Nightly  mirtazapine (REMERON) tab 30 mg, 30 mg, Oral, Nightly  Vortioxetine HBr (TRINTELLIX) tab 10 mg, 10 mg, Oral, Daily      famoTIDine 20 MG Oral Tab, Take 20 mg by mouth daily.   ALPR vision loss  Cardio: denies chest pain, chest pressure, palpitations  Respiratory: Some dyspnea, denies cough, wheezing, hemoptysis   GI: denies nausea, vomiting, abdominal pain  : denies dysuria, hematuria  Musculoskeletal: Neck pain denies arthralgia, cervical discectomy and fusion. There is an ill-defined 3.4 x 1.4 x 3.0 cm prevertebral space fluid collection that spans the C3-C6 vertebral body levels, which is compatible with clinically suspected deep soft tissue infection/abscess.   Surgical drain is 3/24/2021 at 8:39 AM          XR CHEST AP PORTABLE  (CPT=71045)    Result Date: 3/22/2021  CONCLUSION:  1. Patchy vertical linear bibasilar scarring/atelectasis. No large acute airspace consolidation.     Dictated by (CST): Johana Nielson MD on 3/22/2021 a vitals, labs and imaging.     Desmond Price DO  Pulmonary [facility]  3/24/2021  1:02 PM

## 2021-03-24 NOTE — PROGRESS NOTES
Vascular Access Note    Vascular Access Screening:   Allergies to Lidocaine: no  Allergies to Latex: no  Presence of Pacemaker/Defibrillator: No  Mastectomy with Lymph Node Dissection: No  AV Fistula / AV Graft: No  Dialysis Catheter: No  Central Line: Left

## 2021-03-25 ENCOUNTER — APPOINTMENT (OUTPATIENT)
Dept: GENERAL RADIOLOGY | Facility: HOSPITAL | Age: 81
DRG: 857 | End: 2021-03-25
Attending: INTERNAL MEDICINE
Payer: MEDICARE

## 2021-03-25 ENCOUNTER — APPOINTMENT (OUTPATIENT)
Dept: CT IMAGING | Facility: HOSPITAL | Age: 81
DRG: 857 | End: 2021-03-25
Attending: ORTHOPAEDIC SURGERY
Payer: MEDICARE

## 2021-03-25 PROCEDURE — 99233 SBSQ HOSP IP/OBS HIGH 50: CPT | Performed by: INTERNAL MEDICINE

## 2021-03-25 PROCEDURE — 71045 X-RAY EXAM CHEST 1 VIEW: CPT | Performed by: INTERNAL MEDICINE

## 2021-03-25 PROCEDURE — 70491 CT SOFT TISSUE NECK W/DYE: CPT | Performed by: ORTHOPAEDIC SURGERY

## 2021-03-25 RX ORDER — DEXTROSE MONOHYDRATE 25 G/50ML
50 INJECTION, SOLUTION INTRAVENOUS
Status: DISCONTINUED | OUTPATIENT
Start: 2021-03-25 | End: 2021-04-01

## 2021-03-25 RX ORDER — SODIUM CHLORIDE 9 MG/ML
INJECTION, SOLUTION INTRAVENOUS CONTINUOUS
Status: DISCONTINUED | OUTPATIENT
Start: 2021-03-25 | End: 2021-03-28

## 2021-03-25 RX ORDER — HEPARIN SODIUM 5000 [USP'U]/ML
5000 INJECTION, SOLUTION INTRAVENOUS; SUBCUTANEOUS EVERY 12 HOURS SCHEDULED
Status: DISCONTINUED | OUTPATIENT
Start: 2021-03-25 | End: 2021-04-01

## 2021-03-25 NOTE — RESPIRATORY THERAPY NOTE
Pt received on vent with following settings,A/C 14/450/40%+5. No changes or respiratory occurrence on PM shift. Pt's room changed to ICU room # 238 without any occurrence. Pt suctioned as needed,RT will continue to monitor.

## 2021-03-25 NOTE — PLAN OF CARE
Problem: Patient Centered Care  Goal: Patient preferences are identified and integrated in the patient's plan of care  Description: Interventions:  - What would you like us to know as we care for you?  I have two sons  - Provide timely, complete, and accu Progressing  Note: For duration of shift, patient has denied being in pain, but when asked regarding discomfort, she will report that she is uncomfortable.  Despite repositioning and frequent RN rounding, patient eventually required pain medication overnigh delirium, medications)  - Discontinue any unnecessary medical devices as soon as possible  - Assess the patient's physical comfort, circulation, skin condition, hydration, nutrition and elimination needs   - Reorient and redirection as needed  - Assess for Will continue to monitor.

## 2021-03-25 NOTE — PLAN OF CARE
Problem: Patient Centered Care  Goal: Patient preferences are identified and integrated in the patient's plan of care  Description: Interventions:  - What would you like us to know as we care for you?  I have two sons  - Provide timely, complete, and accu report SOB or any respiratory difficulty  - Administer nebulizers and inhalers as ordered  - Assess for changes in mentation and behavior  - Position to facilitate oxygenation and minimize respiratory effort  - Oxygen supplementation based on oxygen satura physical comfort, circulation, skin condition, hydration, nutrition and elimination needs   - Reorient and redirection as needed  - Assess for the need to continue restraints  Outcome: Progressing     Problem: RESPIRATORY - ADULT  Goal: Achieves optimal ve

## 2021-03-25 NOTE — PROGRESS NOTES
[facility]     Progress Note        Amarilys Pretty Patient Status:  Inpatient    1940 MRN [id number]   Location [facility] 2W/SW Attending Arslan Stein MD   Hosp Day # 3 PCP Marie Morgan MD       Subjective:   Patient see Daily  multivitamin with minerals (ADULT) tab 1 tablet, 1 tablet, Per G Tube, Daily  lactated ringers infusion, , Intravenous, Continuous  HYDROmorphone HCl (DILAUDID) 1 MG/ML injection 0.2 mg, 0.2 mg, Intravenous, Q2H PRN   Or  HYDROmorphone HCl (DILAUDID 03/23/21 1722       Physical Exam  Constitutional: Appears anxious, intubated  Eyes: PERRL  ENT: nares patent  Neck: supple, no JVD  Cardio: RRR, S1 S2  Respiratory: clear to auscultation bilaterally, no wheezing, rales, rhonchi, crackles  GI: abdomen soft infection with secondary esophagitis.   Primary esophageal perforation is considered unlikely (particularly given results of previous day esophagram); nonetheless, EGD correlation is suggest. 3. Mild-to-moderate bilateral carotid bifurcation atherosclerosis MD on 3/24/2021 at 3:43 PM          XR CHEST AP PORTABLE  (CPT=71045)    Result Date: 3/24/2021  CONCLUSION:  1. No acute findings.     Dictated by (CST): Natasha Chaidez MD on 3/24/2021 at 8:38 AM     Finalized by (CST): Vickie Hernandez MD

## 2021-03-25 NOTE — PROGRESS NOTES
[facility]    Ortho Medical Progress Note     Tracy Grajeda Patient Status:  Inpatient    1940 MRN [id number]   Location [facility] 2W/SW Attending Estevan Gannon MD   Hosp Day # 3 PCP Keven Salas MD       Subjective:   A 03/25/2021    K 3.3 (L) 03/25/2021     03/25/2021    CO2 25.0 03/25/2021     (H) 03/25/2021    CA 9.3 03/25/2021    ALB 3.1 (L) 03/24/2021    ALKPHO 59 03/24/2021    BILT 0.7 03/24/2021    TP 6.7 03/24/2021    AST 18 03/24/2021    ALT 17 03/24 by (CST): Agustin Zamorano MD on 3/24/2021 at 11:57 AM          XR CHEST AP PORTABLE  (CPT=71045)    Result Date: 3/25/2021  CONCLUSION:   No significant interval change including small left pleural effusion with linear left basilar atelectasis.   Stable mayela sliding scale insulin    Appreciate critical care management. Will follow patient to manage care after the patient has be discharged from ICU. Susannah Cronin [facility], [address] [place] [address] Drive  Office [phone]  Cell [phone]  3/25/2021

## 2021-03-25 NOTE — PROGRESS NOTES
[place] [facility]    [facility]    Mihir Del Castillo Patient Status:  Inpatient    1940 MRN [id number]   Location [facility] 2W/SW Attending Flor Dolan MD   Hosp Day # 3 PCP Helio Dill MD       Subjective: Tila Wang MD at [facility] OR   • attempted revision anterior cervical fusion C5-7, hardware removal, anterior cervical plate, possible revision cervical discectomy N/A 3/22/2021    Performed by Jenny Paris MD at [facility] OR   • [address]  10/05/2020 0.2 mg, Intravenous, Q2H PRN   Or  HYDROmorphone HCl (DILAUDID) 1 MG/ML injection 0.4 mg, 0.4 mg, Intravenous, Q2H PRN   Or  HYDROmorphone HCl (DILAUDID) 1 MG/ML injection 0.8 mg, 0.8 mg, Intravenous, Q2H PRN  Senna (SENOKOT) tab 17.2 mg, 17.2 mg, Oral, Ni MOUTH EVERY OTHER DAY ALTERNATING  WITH  THE  VESICARE  10MG  VESICARE 10 MG Oral Tab, TAKE 1 TABLET BY MOUTH EVERY OTHER DAY (ALTERNATING WITH VESICARE 5MG)  Vortioxetine HBr (TRINTELLIX) 10 MG Oral Tab, Take 1 tablet (10 mg total) by mouth daily.  Take wi listed      Physical Exam:   Blood pressure 122/53, pulse 63, temperature 97.2 °F (36.2 °C), resp. rate 18, height 162.6 cm (5' 4\"), weight 177 lb 11.1 oz (80.6 kg), SpO2 98 %, not currently breastfeeding.     Intubated on vent  Neck with anterior incision there is circumferential esophageal wall thickening at the level of the thoracic inlet with small foci of suspected extraluminal gas along the left lateral and posterior esophageal margins.   These findings could relate to inferior/mediastinal extension of 7:34 PM          XR CHEST AP PORTABLE  (CPT=71045)    Result Date: 3/24/2021  CONCLUSION:  1. Placement of endotracheal tube and left PICC line as described above.     Dictated by (CST): Pérez Chaidez MD on 3/24/2021 at 3:41 PM     Finalized by (C

## 2021-03-25 NOTE — PROGRESS NOTES
SPINE ATTENDING NOTE     Intubated and stable  Afebrile. No drainage from the wound site.   Drain removed yesterday.        03/25/21  0600   BP: 115/65   Pulse: 59   Resp: 15   Temp:       Intubated  Awake  Dressings intact, no drainage  Motor and sensory

## 2021-03-25 NOTE — CDS QUERY
Potential for Impaired Nutritional Status  DOCUMENTATION CLARIFICATION FORM    How To Answer This Query:  1)  Click \"Edit Button\" on the toolbar. 2)  Type an \"X\" in the bracket for the diagnosis that applies.   (You may also add additional clinical det hypertension     Hearing impairment     bilateral hearing aids    Hematuria 2011    High blood pressure     High cholesterol     Hypothyroidism     Lumbar spinal stenosis     Obesity     HOLA (obstructive sleep apnea) 5/3/17-PSG    AHI 38 RDI 61 REM AHI 54 Preferences: Not Obtained   MEDICATIONS: reviewed    MethylPREDNISolone Sodium Succ 125 mg Intravenous Q6H    ceFAZolin 1 g Intravenous Q8H    Senna 17.2 mg Oral Nightly    docusate sodium 100 mg Oral BID    famoTIDine 20 mg Oral Daily    folic acid 909 mc 10% in 6 months, energy intake less than 75% for greater than 1 month and muscle mass moderate depletion, body fat moderate depletion.    NUTRITION INTERVENTION:   - Diet: NPO   - Enteral Nutrition: Initiate Jevity 1.2 at 20 ml/hr via NG/OG tube pending adv

## 2021-03-25 NOTE — PHYSICAL THERAPY NOTE
Attempted physical therapy treatment. Patient transferred to CCU on 3/24 with change in respiratory status and intubated. New orders needed to resume physical therapy treatment. Hold therapy on this date.

## 2021-03-26 ENCOUNTER — APPOINTMENT (OUTPATIENT)
Dept: GENERAL RADIOLOGY | Facility: HOSPITAL | Age: 81
DRG: 857 | End: 2021-03-26
Attending: INTERNAL MEDICINE
Payer: MEDICARE

## 2021-03-26 PROCEDURE — 99233 SBSQ HOSP IP/OBS HIGH 50: CPT | Performed by: INTERNAL MEDICINE

## 2021-03-26 PROCEDURE — 71045 X-RAY EXAM CHEST 1 VIEW: CPT | Performed by: INTERNAL MEDICINE

## 2021-03-26 NOTE — OCCUPATIONAL THERAPY NOTE
Chart reviewed and spoke with RN. Patient to remain intubated at this time. Will place on hold until she is medically appropriate to mobilize with therapy. Will require new OT evaluation orders as medically appropriate.

## 2021-03-26 NOTE — PROGRESS NOTES
[facility]     Progress Note        Radha Todd Patient Status:  Inpatient    1940 MRN [id number]   Location [facility] 2W/SW Attending John Sanderson MD   Hosp Day # 4 PCP Rhiannon Swann MD       Subjective:   Patient see Daily  multivitamin with minerals (ADULT) tab 1 tablet, 1 tablet, Per G Tube, Daily  HYDROmorphone HCl (DILAUDID) 1 MG/ML injection 0.2 mg, 0.2 mg, Intravenous, Q2H PRN   Or  HYDROmorphone HCl (DILAUDID) 1 MG/ML injection 0.4 mg, 0.4 mg, Intravenous, Q2H P PERRL  ENT: nares patent  Neck: supple, no JVD  Cardio: RRR, S1 S2  Respiratory: clear to auscultation bilaterally, no wheezing, rales, rhonchi, crackles  GI: abdomen soft, non tender, active bowel sounds, no organomegaly  Extremities: no clubbing, cyanosi bifurcation atherosclerosis. 4. Lesser incidental findings as above.    Remote - PS  Dictated by (CST): Juanjose Thacker MD on 3/24/2021 at 11:38 AM     Finalized by (CST): Juanjose Thacker MD on 3/24/2021 at 11:57 AM          CT SOFT TISSUE OF NECK(CONTRAST (CPT=71045)    Result Date: 3/25/2021  CONCLUSION:  * Feeding tube noted just beyond the cardioesophageal junction in the upper body. * Endotracheal tube somewhat low but similar in position.  * There is some atelectasis/scarring at the lung bases unchanged extubation plan in near future with ENT and surgery  -Continue Solu-Medrol at this time  -Antibiotics per ID recommendations  -DVT prophylaxis: Heparin  -Discussed with son who is at bedside      Jessica Valencia [address] [facility], [address]

## 2021-03-26 NOTE — PHYSICAL THERAPY NOTE
Attempted follow-up PT treatment 3/26/2021. Per RN, pt remains intubated with no plans to be extubated today. Pt not appropriate for PT treatment this date. Placing this pt on Hold for PT. Updated orders will need placed when medically stable.     Thank you

## 2021-03-26 NOTE — PROGRESS NOTES
Patient received on full vent support A/C 14 450 30% +5 with 7.0 ETT taped 24 @ the lip. ETT tube placement verified with RN. No other changes made.

## 2021-03-26 NOTE — PROGRESS NOTES
[place] [facility] - [facility]    Report of [facility] Progress Note    Tanner Bah Patient Status:  Inpatient    1940 MRN [id number]   Location [facility] 2W/SW Attending Madison Calvert MD   Hosp Day # 4 PCP Harleen Moran MD       Subjective: Tina Montes De Oca MD at [address] [place] [address] MAIN OR   • attempted revision anterior cervical fusion C5-7, hardware removal, anterior cervical plate, possible revision cervical discectomy N/A 3/22/2021    Performed by Arslan Stein MD at [address] [place] [address] OR   • [address]  10/05/2020 Units, 5,000 Units, Subcutaneous, 2 times per day  methylPREDNISolone Sodium Succ (Solu-MEDROL) injection 125 mg, 125 mg, Intravenous, Q6H  propofol (DIPRIVAN) infusion, 5-100 mcg/kg/min (Dosing Weight), Intravenous, Continuous  dextrose 10 % infusion, , I mg, 50 mg, Oral, Daily  Pravastatin Sodium (PRAVACHOL) tab 20 mg, 20 mg, Oral, Nightly  mirtazapine (REMERON) tab 30 mg, 30 mg, Oral, Nightly      famoTIDine 20 MG Oral Tab, Take 20 mg by mouth daily.   ALPRAZOLAM 0.25 MG Oral Tab, TAKE 1 TABLET BY MOUTH ON pressure or chest discomfort.   RESPIRATORY:  as per HPI, No labored breathing  GASTROINTESTINAL:  as per HPI, No anorexia, nausea  GENITOURINARY:  as per HPI, No hematuria  NEUROLOGICAL:  as per HPI, No focal deficits  HEMATOLOGIC:  as per HPI, No anemia, laryngeal structures with thickening of the epiglottis and symmetric thickening of the vocal cords, the latter of which are poorly delineated. There is layering fluid in the oropharynx and hypopharynx.   However, the fluid collection previously seen in the enteric tube with the tip in the mid stomach. The distal aspect of the enteric tube is mildly coiled.     Dictated by (CST): Bette Dale MD on 3/24/2021 at 7:32 PM     Finalized by (CST): Bette Dale MD on 3/24/2021 at 7:34 PM          XR CHEST

## 2021-03-26 NOTE — PLAN OF CARE
Problem: Patient Centered Care  Goal: Patient preferences are identified and integrated in the patient's plan of care  Description: Interventions:  - What would you like us to know as we care for you?  I have two sons  - Provide timely, complete, and accu PRN medication given for comfort.      Problem: Respiratory  Goal: Achieves optimal ventilation and oxygenation  Description: INTERVENTIONS:  - Assess for changes in respiratory status  - Instruct to report SOB or any respiratory difficulty  - Administer ne Frequent oral care performed.       Problem: Safety Risk - Non-Violent Restraints  Goal: Patient will remain free from self-harm  Description: INTERVENTIONS:  - Apply the least restrictive restraint to prevent harm  - Notify patient and family of reasons re

## 2021-03-26 NOTE — RESPIRATORY THERAPY NOTE
Received patient intubated/mechanically ventilated on full support. AC 14/450/30%/+5. Patient transferred to CT and back without complication. Suction provided as needed. No weaning at this time.

## 2021-03-26 NOTE — PROGRESS NOTES
[facility]    Ortho Medical Progress Note     Sid Escobar Patient Status:  Inpatient    1940 MRN [id number]   Location [facility] 2W/SW Attending Pilar Beck MD   Hosp Day # 4 PCP Speedy Mckee MD       Subjective:   A 03/26/2021    CREATSERUM 0.73 03/26/2021    BUN 25 (H) 03/26/2021     03/26/2021    K 3.7 03/26/2021     03/26/2021    CO2 31.0 03/26/2021     (H) 03/26/2021    CA 9.3 03/26/2021    ALB 3.1 (L) 03/24/2021    ALKPHO 59 03/24/2021    BILT Dictated by (CST): Norberto Hunt MD on 3/24/2021 at 11:38 AM     Finalized by (CST): Norberto Hunt MD on 3/24/2021 at 11:57 AM          CT SOFT TISSUE OF NECK(CONTRAST ONLY) (CPT=70491)    Result Date: 3/25/2021  CONCLUSION:  1.  Postoperative changes f cardioesophageal junction in the upper body. * Endotracheal tube somewhat low but similar in position. * There is some atelectasis/scarring at the lung bases unchanged. * Stable cardiomegaly. * Coronary calcifications are noted.  No enlargement or pericardi

## 2021-03-27 ENCOUNTER — APPOINTMENT (OUTPATIENT)
Dept: GENERAL RADIOLOGY | Facility: HOSPITAL | Age: 81
DRG: 857 | End: 2021-03-27
Attending: INTERNAL MEDICINE
Payer: MEDICARE

## 2021-03-27 PROCEDURE — 71045 X-RAY EXAM CHEST 1 VIEW: CPT | Performed by: INTERNAL MEDICINE

## 2021-03-27 PROCEDURE — 99233 SBSQ HOSP IP/OBS HIGH 50: CPT | Performed by: INTERNAL MEDICINE

## 2021-03-27 RX ORDER — FAMOTIDINE 10 MG/ML
20 INJECTION, SOLUTION INTRAVENOUS DAILY
Status: DISCONTINUED | OUTPATIENT
Start: 2021-03-27 | End: 2021-03-27

## 2021-03-27 RX ORDER — RUFINAMIDE 40 MG/ML
1 SUSPENSION ORAL DAILY
Status: DISCONTINUED | OUTPATIENT
Start: 2021-03-27 | End: 2021-04-01

## 2021-03-27 RX ORDER — FAMOTIDINE 20 MG/1
20 TABLET ORAL DAILY
Status: DISCONTINUED | OUTPATIENT
Start: 2021-03-28 | End: 2021-04-01

## 2021-03-27 RX ORDER — METHYLPREDNISOLONE SODIUM SUCCINATE 40 MG/ML
40 INJECTION, POWDER, LYOPHILIZED, FOR SOLUTION INTRAMUSCULAR; INTRAVENOUS DAILY
Status: DISCONTINUED | OUTPATIENT
Start: 2021-03-28 | End: 2021-03-30

## 2021-03-27 NOTE — SLP NOTE
New BSE orders acknowledged. Pt intubated on 3/24/21 and extubated 3/27/21. Will f/u on 3/28/21 for BSE per 24 hour post-extubation policy. Pt NPO with NGT. BSE 3/23/21 recommended pureed/thin liquids.  Collaborated with RN regarding Pt's swallowing plan of

## 2021-03-27 NOTE — PLAN OF CARE
Pt received in bed, sedated. Able to nod head yes/no appropriately, +commands x4. VSS. Tele shows SB. Norco given for pain w/ pt stating good relief. POC and condition update discussed w/ pt and sons. All questions answered.  Pt remains in B SWR d/t attempt scale  - Administer analgesics based on type and severity of pain and evaluate response  - Implement non-pharmacological measures as appropriate and evaluate response  - Consider cultural and social influences on pain and pain management  - Manage/alleviat Ernesto Herron RN  Outcome: Progressing     Problem: Impaired Swallowing  Goal: Minimize aspiration risk  Description: Interventions:  - Patient should be alert and upright for all feedings (90 degrees preferred)  - Offer food and liquids at a slow rate instruct to report SOB or any respiratory difficulty  - Respiratory Therapy support as indicated  - Manage/alleviate anxiety  - Monitor for signs/symptoms of CO2 retention  3/27/2021 0023 by Saritha Raymundo RN  Outcome: Progressing  3/26/2021 2104 by Keysha Eli

## 2021-03-27 NOTE — PROGRESS NOTES
[facility]    Progress Note    Vazquez Adorno Patient Status:  Inpatient    1940 MRN [id number]   Location [facility] 2W/SW Attending Nathen Nicholas MD   Hosp Day # 5 PCP Jacquie Ortiz MD       Subjective:   Vazquez Adorno i 03/27/2021    HGB 11.9 (L) 03/27/2021    HCT 37.2 03/27/2021    .0 03/27/2021    CREATSERUM 0.84 03/27/2021    BUN 29 (H) 03/27/2021     03/27/2021    K 3.9 03/27/2021     03/27/2021    CO2 28.0 03/27/2021     (H) 03/27/2021    CA (CPT=71045)    Result Date: 3/26/2021  CONCLUSION:   No significant interval change including small left pleural effusion and linear scarring and/or atelectasis left lung base. Stable lines and tubes.     Dictated by (CST): Orquidea Schumacher MD on 3/26/202

## 2021-03-27 NOTE — PLAN OF CARE
Pt remains in B SWR d/t attempts to pull @ ETT/medical equipment.        Problem: Safety Risk - Non-Violent Restraints  Goal: Patient will remain free from self-harm  Description: INTERVENTIONS:  - Apply the least restrictive restraint to prevent harm  - No

## 2021-03-27 NOTE — PLAN OF CARE
Problem: Patient Centered Care  Goal: Patient preferences are identified and integrated in the patient's plan of care  Description: Interventions:  - What would you like us to know as we care for you?  I have two sons  - Provide timely, complete, and accu integrity  - Administer medications as ordered  - Assess and document risk factors for pressure ulcer development  - Assess and document dressing/incision, wound bed, drain sites and surrounding tissue  - Implement wound care per orders  - Initiate self-in Encourage broncho-pulmonary hygiene including cough, deep breathe, Incentive Spirometry  - Assess the need for suctioning and perform as needed  - Assess and instruct to report SOB or any respiratory difficulty  - Respiratory Therapy support as indicated

## 2021-03-27 NOTE — PROGRESS NOTES
[facility]    Report of [facility] ID Progress Note    Yari Esquivel Patient Status:  Inpatient    1940 MRN [id number]   Location [facility] 2W/SW Attending Alexia Cabrera MD   Hosp Day # 5 PCP Ramirez Johnson MD       Subjective: [facility] MAIN OR   • attempted revision anterior cervical fusion C5-7, hardware removal, anterior cervical plate, possible revision cervical discectomy N/A 3/22/2021    Performed by Dionte Lay MD at [address] [place] [address] MAIN OR   • BACK SURGERY  10/05/2020    C5-7 cervical UNIT/ML injection 1-5 Units, 1-5 Units, Subcutaneous, 4 times per day  Heparin Sodium (Porcine) 5000 UNIT/ML injection 5,000 Units, 5,000 Units, Subcutaneous, 2 times per day  methylPREDNISolone Sodium Succ (Solu-MEDROL) injection 125 mg, 125 mg, Intraveno 50 mg, Oral, Daily  Pravastatin Sodium (PRAVACHOL) tab 20 mg, 20 mg, Oral, Nightly  mirtazapine (REMERON) tab 30 mg, 30 mg, Oral, Nightly      famoTIDine 20 MG Oral Tab, Take 20 mg by mouth daily.   ALPRAZOLAM 0.25 MG Oral Tab, TAKE 1 TABLET BY MOUTH ONCE D 4.02\" (1.626 m), weight 194 lb 14.2 oz (88.4 kg), SpO2 97 %, not currently breastfeeding.     Intubated on vent, nods/shakes head  Neck with anterior incision in place, ETT+, dobhoff+  No rash  Heart regular  Lungs clear in all fields  Abdomen soft, no TTP Oneyda Alexanedr MD on 3/25/2021 at 10:13 PM          XR CHEST AP PORTABLE  (CPT=71045)    Result Date: 3/27/2021  CONCLUSION:  1. Mild left basilar subsegmental atelectasis and pleural effusion.     Dictated by (CST): Madhu Dobbs MD on 3/27/2021 at 9:13

## 2021-03-27 NOTE — PROGRESS NOTES
[facility] Pharmacy Note:  Renal Adjustment for ampicillin/sulbactam (UNASYN)    Debra Crain is a 78year old patient who has been prescribed ampicillin/sulbactam (UNASYN) 3 g every 8 hrs.   The estimated creatinine clearance is 43.8 mL/min (based on SCr of 0.8

## 2021-03-27 NOTE — PROGRESS NOTES
[facility]    Progress Note      Assessment and Plan:   1. Postoperative respiratory failure–patient tolerated CPAP and we progressed to extubation and she is doing well clinically now.   Chest x-ray demonstrates left basilar atelectasis ve 03/27/2021    CA 9.4 03/27/2021     Chest x-ray–left basilar haziness    Sae Desai MD  Medical Director, Critical Care, [facility]  Medical Director, [address] [place] [address]. [phone] R  Pager: [phone]

## 2021-03-27 NOTE — PROGRESS NOTES
[facility] Pharmacy Note: Route Optimization for Famotidine (PEPCID)    Patient is currently on Famotidine (PEPCID) 20 mg IV every 24 hours.    The patient meets the criteria to convert to the oral equivalent as established by the IV to Oral conversion protocol ap

## 2021-03-28 PROCEDURE — 99232 SBSQ HOSP IP/OBS MODERATE 35: CPT | Performed by: INTERNAL MEDICINE

## 2021-03-28 NOTE — SLP NOTE
ADULT SWALLOWING EVALUATION    ASSESSMENT    ASSESSMENT/OVERALL IMPRESSION:  PPE: DROPLET MASK, GOGGLES AND GLOVES. HAND SANITIZED UPON ENTRANCE/EXIT. SLP BSSE orders received and acknowledged.  A swallow evaluation warranted as pt s/p extubation (3/24- Strategies Recommended: No straws; Slow rate;Small bites and sips;Multiple swallows; Extra sauce/gravy (cough and clear)  Aspiration Precautions: Upright position; Slow rate;Small bites and sips; No straw  Medication Administration Recommendations: Non-oral  T baseline. SWALLOWING HISTORY  Current Diet Consistency: NPO  Dysphagia History: Reports dysphagia 10/2020 following ACDF procedure. Imaging Results:     CXR 3/27/21 CONCLUSION:   1.  Mild left basilar subsegmental atelectasis and pleural effusion.     Language Pathologist  EXT [id number]

## 2021-03-28 NOTE — PLAN OF CARE
Problem: Patient Centered Care  Goal: Patient preferences are identified and integrated in the patient's plan of care  Description: Interventions:  - What would you like us to know as we care for you?  I have two sons  - Provide timely, complete, and accu report SOB or any respiratory difficulty  - Administer nebulizers and inhalers as ordered  - Assess for changes in mentation and behavior  - Position to facilitate oxygenation and minimize respiratory effort  - Oxygen supplementation based on oxygen satura cough, deep breathe, Incentive Spirometry  - Assess the need for suctioning and perform as needed  - Assess and instruct to report SOB or any respiratory difficulty  - Respiratory Therapy support as indicated  - Manage/alleviate anxiety  - Monitor for sign

## 2021-03-28 NOTE — PHYSICAL THERAPY NOTE
PHYSICAL THERAPY RE-EVALUATION - INPATIENT     Room Number: 238/238-A  Evaluation Date: 3/28/2021  Type of Evaluation: Re-evaluation   Physician Order: PT Eval and Treat    Presenting Problem: revision C5-7 ACDF; dysphagia; cervical myelopathy  Reason for benefit from home with home health. Patient will benefit from continued IP PT services to address these deficits in preparation for discharge.     DISCHARGE RECOMMENDATIONS  PT Discharge Recommendations: 24 hour care/supervision;Home with home health PT INST 1 LEVEL N/A 3/22/2021    Performed by Pilar Beck MD at [facility] OR   • ANTERIOR CERVICAL HARDWARE REMOVAL N/A 3/22/2021    Performed by Pilar Beck MD at [address] [place] [address]   • attempted revision anterior cervical fusion C5-7, hardware removal, ante '6-Clicks' INPATIENT SHORT FORM - BASIC MOBILITY  How much difficulty does the patient currently have. ..  -   Turning over in bed (including adjusting bedclothes, sheets and blankets)?: A Little   -   Sitting down on and standing up from a chair with arms activity/exercise instructions provided to patient in preparation for discharge.    Goal #5   Current Status    Goal #6    Goal #6  Current Status

## 2021-03-28 NOTE — PROGRESS NOTES
[facility]     Progress Note         Patient Status:  Inpatient    1940 MRN [id number]   Location [facility] 2W/SW Attending Toni Resendiz MD   Hosp Day # 6 PCP Magy Canseco MD       Subjective:   Patient see Intravenous, Q2H PRN   Or  HYDROmorphone HCl (DILAUDID) 1 MG/ML injection 0.4 mg, 0.4 mg, Intravenous, Q2H PRN   Or  HYDROmorphone HCl (DILAUDID) 1 MG/ML injection 0.8 mg, 0.8 mg, Intravenous, Q2H PRN  Senna (SENOKOT) tab 17.2 mg, 17.2 mg, Oral, Nightly  d cyanosis, edema  Neurologic: no gross motor deficits  Skin: warm, dry      Results:        XR CHEST AP PORTABLE  (CPT=71045)    Result Date: 3/27/2021  CONCLUSION:  1. Mild left basilar subsegmental atelectasis and pleural effusion.     Dictated by (CST): P

## 2021-03-28 NOTE — PLAN OF CARE
Double RN skin check done prior to transfer off Unit. Skin check performed by this RN and rona    Wounds are as follows: no wounds expect neck incision    Will remain available for any further questions or concerns.

## 2021-03-28 NOTE — PROGRESS NOTES
[facility]    Progress Note    Narcisa Ingram Patient Status:  Inpatient    1940 MRN [id number]   Location [facility] 4W/SW/SE Attending Dionte Lay MD   Hosp Day # 6 PCP Penelope Fiore MD       Subjective:   Narcisa Ingram Value Date    WBC 11.5 (H) 03/27/2021    HGB 11.9 (L) 03/27/2021    HCT 37.2 03/27/2021    .0 03/27/2021    CREATSERUM 0.84 03/27/2021    BUN 29 (H) 03/27/2021     03/27/2021    K 3.9 03/27/2021     03/27/2021    CO2 28.0 03/27/2021    G

## 2021-03-28 NOTE — DIETARY NOTE
ADULT NUTRITION REASSESSMENT    Pt is at high nutrition risk. Pt meets severe malnutrition criteria.       CRITERIA FOR MALNUTRITION DIAGNOSIS:  Criteria for severe malnutrition diagnosis: chronic illness related to wt loss greater than 10% in 6 months, en swelling from surgery causing decreased intake. After surgery on Monday, pt unable to eat now strict NPO due to dysphagia concerns and stridor. MD outside room and reported no plans for intubation at this time, but plans to monitor.  Will remain strict NPO Oral Nightly     LABS: noted  Recent Labs     03/25/21  0500 03/26/21  0515 03/27/21  0629   * 178* 180*   BUN 17 25* 29*   CREATSERUM 0.66 0.73 0.84   CA 9.3 9.3 9.4   MG 1.9  --   --     144 144   K 3.3* 3.7 3.9    111 111   CO2 25.0 3 greater than 1 month and muscle mass moderate depletion, body fat moderate depletion.   NUTRITION DIAGNOSIS PROGRESS:  Improvement (unresolved)      NUTRITION INTERVENTION:  - Diet: Enteral Nutrition (EN) and NPO   - Enteral Nutrition: Initiate Jevity 1.2

## 2021-03-28 NOTE — PLAN OF CARE
Uneventful night. Pt A/Ox4. VSS. Afebrile. O2 weaned from 4L to [facility], tolerating well. Pt w/ hoarse voice & weak cough, suctioning oral secretions per self frequently. Dobhoff in place w/ continuous TF, tolerating well.  Speech to perform repeat swallow kobi management  - Manage/alleviate anxiety  - Utilize distraction and/or relaxation techniques  - Monitor for opioid side effects  - Notify MD/LIP if interventions unsuccessful or patient reports new pain  - Anticipate increased pain with activity and pre-medi noted  Outcome: Progressing     Problem: RESPIRATORY - ADULT  Goal: Achieves optimal ventilation and oxygenation  Description: INTERVENTIONS:  - Assess for changes in respiratory status  - Assess for changes in mentation and behavior  - Position to facilit

## 2021-03-28 NOTE — OCCUPATIONAL THERAPY NOTE
OCCUPATIONAL THERAPY EVALUATION - INPATIENT     Room Number: 407/407-A  Evaluation Date: 3/28/2021  Type of Evaluation: Re-evaluation  Presenting Problem: Re-eval 2/2 t/f to CCU for decreased resp. status    Physician Order: IP Consult to Occupational Ther List  Principal Problem:    Cervical myelopathy (HCC)  Active Problems:    OAB (overactive bladder)    GERD (gastroesophageal reflux disease)    Hypothyroidism    Dyslipidemia    Spinal stenosis    Vitamin B12 deficient megaloblastic anemia    Macular dege CHOLECYSTECTOMY  3/19/15     Laparoscopic by Dr. Maria Fernanda Jordan   • COLONOSCOPY  6/3/14    colon polyp and EGD done too.    • COLONOSCOPY  08/06/2019   • EGD  08/06/2019    gastric polyp removed (benign)   • EGD  09/02/2020    chronic inactive gastritis with intes TRANSFER ASSESSMENT  Supine to Sit : Other (Comment) (sba)  Sit to Stand: Contact guard assist (with RW)  Toilet Transfer: CGA with RW  Chair Transfer: CGA with RW    BALANCE ASSESSMENT  Static Sitting: Good- at edge of bedside chair  Static Standing: Evelio Farias

## 2021-03-29 ENCOUNTER — APPOINTMENT (OUTPATIENT)
Dept: GENERAL RADIOLOGY | Facility: HOSPITAL | Age: 81
DRG: 857 | End: 2021-03-29
Attending: INTERNAL MEDICINE
Payer: MEDICARE

## 2021-03-29 PROBLEM — R13.10 DYSPHAGIA: Status: ACTIVE | Noted: 2021-03-29

## 2021-03-29 PROCEDURE — 99232 SBSQ HOSP IP/OBS MODERATE 35: CPT | Performed by: INTERNAL MEDICINE

## 2021-03-29 PROCEDURE — 74230 X-RAY XM SWLNG FUNCJ C+: CPT | Performed by: INTERNAL MEDICINE

## 2021-03-29 RX ORDER — FUROSEMIDE 20 MG/1
20 TABLET ORAL DAILY
Status: COMPLETED | OUTPATIENT
Start: 2021-03-30 | End: 2021-03-30

## 2021-03-29 RX ORDER — GARLIC EXTRACT 500 MG
1 CAPSULE ORAL 2 TIMES DAILY
Status: DISCONTINUED | OUTPATIENT
Start: 2021-03-29 | End: 2021-04-01

## 2021-03-29 RX ORDER — ACETAMINOPHEN AND CODEINE PHOSPHATE 300; 30 MG/1; MG/1
1 TABLET ORAL EVERY 6 HOURS PRN
Status: DISCONTINUED | OUTPATIENT
Start: 2021-03-29 | End: 2021-04-01

## 2021-03-29 NOTE — PHYSICAL THERAPY NOTE
PHYSICAL THERAPY TREATMENT NOTE - INPATIENT     Room Number: 407/407-A       Presenting Problem: revision C5-7 ACDF; dysphagia; cervical myelopathy    Problem List  Principal Problem:    Cervical myelopathy ([address]  Active Problems:    OAB (overactive bladde Position: Sitting    O2 WALK                  AM-PAC '6-Clicks' INPATIENT SHORT FORM - BASIC MOBILITY  How much difficulty does the patient currently have. ..  -   Turning over in bed (including adjusting bedclothes, sheets and blankets)?: A Lot   -   Sitti independence with home activity/exercise instructions provided to patient in preparation for discharge.    Goal #5   Current Status In progress   Goal #6    Goal #6  Current Status

## 2021-03-29 NOTE — PROGRESS NOTES
[facility]    Ortho Medical Progress Note     Nupur Cheung Patient Status:  Inpatient    1940 MRN [id number]   Location [facility] 4W/SW/SE Attending Saint Favre, MD   Hosp Day # 7 PCP Lisa Marshall MD       Subjective: insufficiency        Obstructive sleep apnea        Bilateral carotid artery disease, unspecified type (HCC)        Dysphagia    Leukocytosis - on Unasyn    Ankle edema - lasix in am      Results:     Lab Results   Component Value Date    WBC 15.4 (H) 03/2

## 2021-03-29 NOTE — PROGRESS NOTES
[place] [facility]    Report of [facility] ID Progress Note    Pama Quant Patient Status:  Inpatient    1940 MRN [id number]   Location [facility] 2W/SW Attending Sarah Amin MD   Hosp Day # 7 PCP Aleena Prather MD       Subjective: attempted revision anterior cervical fusion C5-7, hardware removal, anterior cervical plate, possible revision cervical discectomy N/A 3/22/2021    Performed by Mahamed Rodríguez MD at [address] [place] [address] OR   • BACK SURGERY  10/05/2020    C5-7 cervical fusion with  (Porcine) 5000 UNIT/ML injection 5,000 Units, 5,000 Units, Subcutaneous, 2 times per day  dextrose 10 % infusion, , Intravenous, Continuous PRN  Thiamine HCl (Vitamin B-1) tab 100 mg, 100 mg, Per NG Tube, Daily  HYDROmorphone HCl (DILAUDID) 1 MG/ML injecti NEEDED FOR ANXIETY  mirtazapine 30 MG Oral Tab, TAKE 1 TABLET BY MOUTH AT NIGHT  LEVOTHYROXINE SODIUM 25 MCG Oral Tab, TAKE 1 TABLET BY MOUTH IN THE MORNING BEFORE BREAKFAST  VESICARE 5 MG Oral Tab, TAKE 1 TABLET BY MOUTH EVERY OTHER DAY ALTERNATING  WITH Component Value Date    WBC 15.4 (H) 03/29/2021    HGB 12.7 03/29/2021    HCT 39.8 03/29/2021    .0 03/29/2021    CREATSERUM 0.83 03/29/2021    BUN 27 (H) 03/29/2021     (H) 03/29/2021    K 3.9 03/29/2021     (H) 03/29/2021    CO2 28.0

## 2021-03-29 NOTE — PROGRESS NOTES
Pt's son Ernestina East was present and attentive at bedside. Pt's voice has come back a bit in the last couple days after having lost most of it for last two weeks. Prayed with and for pt, engaged in non-anxious presence. Will continue to monitor.      03/29/21 15

## 2021-03-29 NOTE — PLAN OF CARE
Patient is alert and oriented X4, on room air and resting in chair overnight. Patient is on tele with 1 call for HR in 140's with ambulation. Patient is voiding freely and no BM overnight. Patient complained of pain iv dilaudid given.  Patient is up with 1 assistance  - Assess pain using appropriate pain scale  - Administer analgesics based on type and severity of pain and evaluate response  - Implement non-pharmacological measures as appropriate and evaluate response  - Consider cultural and social influenc No straws  - Encourage small bites of food and small sips of liquid  - Offer pills one at a time, crush or deliver with applesauce as needed  - Discontinue feeding and notify MD (or speech pathologist) if coughing or persistent throat clearing or wet/gurgl

## 2021-03-29 NOTE — OCCUPATIONAL THERAPY NOTE
OCCUPATIONAL THERAPY TREATMENT NOTE - INPATIENT        Room Number: 407/407-A           Presenting Problem: Re-eval 2/2 t/f to CCU for decreased resp. status    Problem List  Principal Problem:    Cervical myelopathy ([address]  Active Problems:    OAB (overact Inpatient Daily Activity Short Form. Research supports that patients with this level of impairment may benefit from [facility].     DISCHARGE RECOMMENDATIONS  OT Discharge Recommendations: 24 hour care/supervision;Home with home health PT/OT        PLAN  JEREMIE Padron within reach;RN aware of session/findings; Family present    OT Goals:      Patient will complete functional transfer with MOD I  Comment: ongoing    Patient will complete toileting with MOD I  Comment: ongoing    Patient will tolerate standing for 5 minute

## 2021-03-29 NOTE — PROGRESS NOTES
[facility]     Progress Note        Yari Esquivel Patient Status:  Inpatient    1940 MRN [id number]   Location [facility] 2W/SW Attending Alexia Cabrera MD   Hosp Day # 7 PCP Ramirez Johnson MD       Subjective:   Patient see mg, Intravenous, Q2H PRN  Senna (SENOKOT) tab 17.2 mg, 17.2 mg, Oral, Nightly  docusate sodium (COLACE) cap 100 mg, 100 mg, Oral, BID  PEG 3350 (MIRALAX) powder packet 17 g, 17 g, Oral, Daily PRN  magnesium hydroxide (MILK OF MAGNESIA) 400 MG/5ML suspensio CREATSERUM 0.83 03/29/2021    BUN 27 03/29/2021     03/29/2021    K 3.9 03/29/2021     03/29/2021    CO2 28.0 03/29/2021    GLU 97 03/29/2021    CA 8.8 03/29/2021       No results found.           Assessment   1.  Stridor, resolved  2.  Status p

## 2021-03-29 NOTE — SLP NOTE
ADULT VIDEOFLUOROSCOPIC SWALLOWING STUDY    Admission Date: 3/22/2021  Evaluation Date: 03/29/21  Radiologist: Dr. Jeri High   Diet Recommendations - Solids: Puree  Diet Recommendations - Liquid: Honey thick (Liquids by teaspoon with 3-4 with swallowing    • Sleep apnea     has c-pap.  not using presently   • Tremor    • Vitamin D insufficiency 3/6/2017       Current Diet Consistency: NPO  Prior Level of Function: Independent  Prior Living Situation: Home with support  History of Recent: Leopold Martens swallow;Multiple swallows  Effectiveness: No  NECTAR THICK LIQUIDS  Method of Presentation: Teaspoon  Oral Phase of Swallow (VFSS - Nectar Thick Liquids):  Within Functional Limits  Triggered at: Valleculae  Premature Spillage to: Valleculae  Delay (seconds Multiple swallows; Liquid assist  Laryngeal Penetration: None (At risk)  Tracheal Aspiration: None (At risk)  Strategy(ies) Implemented (Hard Solid): No straws; Slow rate; Alterante consistencies;Small bites and sips;Multiple swallows;Effortful swallow  Effec with 3-4 secondary dry swallows. The pt benefited from small controlled amounts of puree and HTL by teaspoon to assist with more pharyngeal clearance. The pt remains at risk for aspiration after the swallow on pharyngeal residue.   Reviewed aspiration pre via tsp amount only, effortful swallows with moderate assistance 90 % of the time across 2 sessions. In Progress     PLAN: Speech therapy x4 to monitor swallowing tolerance and train swallowing precautions.    If overt clinical signs of aspiration observ

## 2021-03-30 PROCEDURE — 99232 SBSQ HOSP IP/OBS MODERATE 35: CPT | Performed by: INTERNAL MEDICINE

## 2021-03-30 NOTE — PHYSICAL THERAPY NOTE
PHYSICAL THERAPY TREATMENT NOTE - INPATIENT     Room Number: 407/407-A       Presenting Problem: revision C5-7 ACDF; dysphagia; cervical myelopathy    Problem List  Principal Problem:    Cervical myelopathy ([address]  Active Problems:    OAB (overactive bladde -    ACTIVITY TOLERANCE                         O2 WALK                  AM-PAC '6-Clicks' INPATIENT SHORT FORM - BASIC MOBILITY  How much difficulty does the patient currently have. ..  -   Turning over in bed (including adjusting bedclothes, sheets and bl independence with home activity/exercise instructions provided to patient in preparation for discharge.    Goal #5   Current Status In progress   Goal #6    Goal #6  Current Status

## 2021-03-30 NOTE — SLP NOTE
SPEECH DAILY NOTE - INPATIENT    ASSESSMENT & PLAN   ASSESSMENT  SLP f/u for ongoing dysphagia tx and meal assessment per recommendations of 3/29/2021 BSE. RN reports pt tolerates diet and medication well with no overt clinical s/s aspiration.  Pt denies an Communication: Discussed with RN      GOALS  Goal #1 The patient will tolerate puree consistency and teaspoon amounts of honey thick liquids without overt signs or symptoms of aspiration with 100 % accuracy over 4 session(s).   Pt tolerates pureed solids an

## 2021-03-30 NOTE — PLAN OF CARE
Up with walker with 1 assist to bathroom, gait steady/slow. Tube feed tube patent, flushed. Tolerating PO puree intake with honey thick liquids per speech recommendations. Tube feed HS only starting tonight.  Left arm precautions with PICC double lumen ramirez pain  - Anticipate increased pain with activity and pre-medicate as appropriate  Outcome: Progressing     Problem: Respiratory  Goal: Achieves optimal ventilation and oxygenation  Description: INTERVENTIONS:  - Assess for changes in respiratory status  - I for changes in mentation and behavior  - Position to facilitate oxygenation and minimize respiratory effort  - Oxygen supplementation based on oxygen saturation or ABGs  - Provide Smoking Cessation handout, if applicable  - Encourage broncho-pulmonary hygi

## 2021-03-30 NOTE — PROGRESS NOTES
[facility]    Ortho Medical Progress Note     Jeff Gresham Patient Status:  Inpatient    1940 MRN [id number]   Location [facility] 4W/SW/SE Attending Jane Duong MD   Hosp Day # 8 PCP Aundrea Tenorio MD       Subjective: Bilateral carotid artery disease, unspecified type ([facility]        Dysphagia  Enteral feeding at night via keofeed tube.   Pureed diet with honey thick liquid      Results:     Lab Results   Component Value Date    WBC 12.5 (H) 03/30/2021    HGB 11.0 (L) 03/30

## 2021-03-30 NOTE — DIETARY NOTE
ADULT NUTRITION UPDATE NOTE    PATIENT STATUS: Initial 03/24/21: Pt admit for cervical revision. S/p C5-C7 spinal fusion via anterior approach done in October 2020 at [facility]. Has had persistent R sided neck pain and difficulty swallowing.   Was admitted for re taking, but she reports she has to be careful with milk products and her bowel. When consistency of liquids improved, there are more options for supplements--likes bob flavor. FOOD/NUTRITION RELATED HISTORY:  Appetite: Fair; Intake Meeting Needs:  Sabrina Roberts

## 2021-03-30 NOTE — CM/SW NOTE
AROLDO followed up on DC planning. SW met with pt and son in the room to discuss DC plan after ICU stay. Pt would need IV Abx at DC. [facility] is following pt and pt is accepted by [facility].  Upon discussion of [facility] vs SNF pt states she does not want to go SNF and wo

## 2021-03-30 NOTE — PROGRESS NOTES
[facility]     Progress Note        Modesto Ortega Patient Status:  Inpatient    1940 MRN [id number]   Location [facility] 2W/SW Attending Mahamed Rodríguez MD   Hosp Day # 8 PCP Neymar Cantu MD       Subjective:   Patient see Intravenous, Q2H PRN  Senna (SENOKOT) tab 17.2 mg, 17.2 mg, Oral, Nightly  docusate sodium (COLACE) cap 100 mg, 100 mg, Oral, BID  PEG 3350 (MIRALAX) powder packet 17 g, 17 g, Oral, Daily PRN  magnesium hydroxide (MILK OF MAGNESIA) 400 MG/5ML suspension 30 SWALLOW (ZOH=86472)    Result Date: 3/29/2021  CONCLUSION:  1. Evidence of penetration but no obvious aspiration. 2.          A full report from speech pathology to follow. Dictated by (CST):  Hanny Jeffers MD on 3/29/2021 at 12:56 PM     Finalized by

## 2021-03-31 PROCEDURE — 99232 SBSQ HOSP IP/OBS MODERATE 35: CPT | Performed by: INTERNAL MEDICINE

## 2021-03-31 RX ORDER — PSEUDOEPHEDRINE HCL 30 MG
100 TABLET ORAL 2 TIMES DAILY
Refills: 0 | Status: SHIPPED | COMMUNITY
Start: 2021-03-31 | End: 2021-04-01

## 2021-03-31 RX ORDER — ACETAMINOPHEN 325 MG/1
650 TABLET ORAL EVERY 4 HOURS PRN
Refills: 0 | Status: ON HOLD | COMMUNITY
Start: 2021-03-31 | End: 2021-05-03

## 2021-03-31 NOTE — PROGRESS NOTES
[facility]    Progress Note    Robyn Doe Patient Status:  Inpatient    1940 MRN [id number]   Location [facility] 4W/SW/SE Attending Sanchez Rapp MD   Hosp Day # 9 PCP Zackary Issa MD       Subjective:   oRbyn Doe Planning discharge tomorrow if remains stable.          OAB (overactive bladder)        GERD (gastroesophageal reflux disease)        Hypothyroidism        Dyslipidemia        Spinal stenosis        Vitamin B12 deficient megaloblastic anemia        Macular

## 2021-03-31 NOTE — PROGRESS NOTES
[facility]     Progress Note        Neftali Abrams Patient Status:  Inpatient    1940 MRN [id number]   Location [facility] 2W/SW Attending Chloe Gallagher MD   Hosp Day # 9 PCP Efrem Gonzalez MD       Subjective:   Patient see 0.2 mg, 0.2 mg, Intravenous, Q2H PRN  Senna (SENOKOT) tab 17.2 mg, 17.2 mg, Oral, Nightly  docusate sodium (COLACE) cap 100 mg, 100 mg, Oral, BID  PEG 3350 (MIRALAX) powder packet 17 g, 17 g, Oral, Daily PRN  magnesium hydroxide (MILK OF MAGNESIA) 400 MG/5 XR VIDEO SWALLOW (CPT=74230)    Result Date: 3/29/2021  CONCLUSION:  1. Evidence of penetration but no obvious aspiration. 2.          A full report from speech pathology to follow. Dictated by (CST):  Catherine Guerrero MD on 3/29/2021 at 12:56 PM

## 2021-03-31 NOTE — PROGRESS NOTES
[facility]    Report of [facility] ID Progress Note    Marla Rivas Patient Status:  Inpatient    1940 MRN [id number]   Location [facility] 2W/SW Attending Lonni Nageotte, MD   Hosp Day # 9 PCP Octavio Benavidez MD       Subjective: Performed by Ernesto Valerio MD at [facility]   • attempted revision anterior cervical fusion C5-7, hardware removal, anterior cervical plate, possible revision cervical discectomy N/A 3/22/2021    Performed by Ernesto Valerio MD at [address] [place] [address] mL, 50 mL, Intravenous, Q15 Min PRN   Or  glucose (DEX4) oral liquid 30 g, 30 g, Oral, Q15 Min PRN   Or  Glucose-Vitamin C (DEX-4) chewable tab 8 tablet, 8 tablet, Oral, Q15 Min PRN  Heparin Sodium (Porcine) 5000 UNIT/ML injection 5,000 Units, 5,000 Units, BREAKFAST  VESICARE 5 MG Oral Tab, TAKE 1 TABLET BY MOUTH EVERY OTHER DAY ALTERNATING  WITH  THE  VESICARE  10MG  VESICARE 10 MG Oral Tab, TAKE 1 TABLET BY MOUTH EVERY OTHER DAY (ALTERNATING WITH VESICARE 5MG)  Vortioxetine HBr (TRINTELLIX) 10 MG Oral Tab, (H) 03/31/2021     03/31/2021    K 3.9 03/31/2021     03/31/2021    CO2 30.0 03/31/2021     (H) 03/31/2021    CA 9.3 03/31/2021    ALB 3.1 (L) 03/24/2021    ALKPHO 59 03/24/2021    TP 6.7 03/24/2021    AST 18 03/24/2021    ALT 17 03/24/2

## 2021-03-31 NOTE — PHYSICAL THERAPY NOTE
PHYSICAL THERAPY TREATMENT NOTE - INPATIENT     Room Number: 407/407-A       Presenting Problem: revision C5-7 ACDF; dysphagia; cervical myelopathy    Problem List  Principal Problem:    Cervical myelopathy ([address]  Active Problems:    OAB (overactive bladde [address]                         O2 WALK                  AM-PAC '6-Clicks' INPATIENT SHORT FORM - BASIC MOBILITY  How much difficulty does the patient currently have. ..  -   Turning over in bed (including adjusting bedclothes Current Status Navigated 4 stairs with Min a   Goal #5 Patient to demonstrate independence with home activity/exercise instructions provided to patient in preparation for discharge.    Goal #5   Current Status In progress   Goal #6    Goal #6  Current Sta

## 2021-03-31 NOTE — OCCUPATIONAL THERAPY NOTE
Pt not seen for OT at this time secondary to MD in room. Will attempt to see pt later in date as pt is available.

## 2021-03-31 NOTE — PLAN OF CARE
Pt is POD #8-9. Vital signs within normal limits. Denies pain. Alert and oriented x4. CMS intact. On room air. Tolerating pureed/honey thick liquids. TF infusing via NG overnight. Voids freely. Incision clean, dry, and intact.  Up with one assist and the wa management  - Manage/alleviate anxiety  - Utilize distraction and/or relaxation techniques  - Monitor for opioid side effects  - Notify MD/LIP if interventions unsuccessful or patient reports new pain  - Anticipate increased pain with activity and pre-medi noted  Outcome: Progressing     Problem: RESPIRATORY - ADULT  Goal: Achieves optimal ventilation and oxygenation  Description: INTERVENTIONS:  - Assess for changes in respiratory status  - Assess for changes in mentation and behavior  - Position to facilit

## 2021-03-31 NOTE — SLP NOTE
SPEECH DAILY NOTE - INPATIENT    ASSESSMENT & PLAN   ASSESSMENT  PPE REQUIRED. THIS SLP WORE GLOVES AND DROPLET MASK. HANDS SANITIZED/WASHED UPON ENTRANCE/EXIT. Speech therapy for ongoing dysphagia tx and meal assessment per recommendations of VFSS.  Co with results and recommendations. MOST RECENT CXR   CONCLUSION:   1. Mild left basilar subsegmental atelectasis and pleural effusion.    Dictated by (CST): Benito Chaidez MD on 3/27/2021 at 9:13 AM       Finalized by (CST): Wilder Chaidez with 100 % accuracy AS SWELLING REDUCES over 3 session(s). Not appropriate at this time. Trial of NTL by moody with a delayed cough.   In Progress   Goal #4 The patient will utilize compensatory strategies as outlined by  VFSS including Slow rate, Smal

## 2021-03-31 NOTE — OCCUPATIONAL THERAPY NOTE
OCCUPATIONAL THERAPY TREATMENT NOTE - INPATIENT        Room Number: 407/407-A           Presenting Problem: Re-eval 2/2 t/f to CCU for decreased resp. status    Problem List  Principal Problem:    Cervical myelopathy ([address]  Active Problems:    OAB (overact chair with all needs within reach and family present. DISCHARGE RECOMMENDATIONS  OT Discharge Recommendations: Home with home health PT/OT  OT Device Recommendations: Lucyer;Karlee chacko; Shower chair     PLAN  OT Treatment Plan: Balance activities; ADL tr with set up and pt seated  Lower Extremity Dressing: min assist, discussed use of LH AE to assist. Pt stating has slip on style shoes, family helps with socks, and pt declined attempting pants.      Education Provided: role of OT, dressing, standing toleran

## 2021-04-01 VITALS
SYSTOLIC BLOOD PRESSURE: 134 MMHG | HEIGHT: 64.02 IN | DIASTOLIC BLOOD PRESSURE: 63 MMHG | WEIGHT: 194.88 LBS | BODY MASS INDEX: 33.27 KG/M2 | TEMPERATURE: 98 F | OXYGEN SATURATION: 100 % | HEART RATE: 100 BPM | RESPIRATION RATE: 16 BRPM

## 2021-04-01 PROCEDURE — 99232 SBSQ HOSP IP/OBS MODERATE 35: CPT | Performed by: INTERNAL MEDICINE

## 2021-04-01 NOTE — CM/SW NOTE
AROLDO followed up on DC planning. AROLDO confirmed with RN that pt should be medically cleared for discharge today. Pt would DC [facility]x. AROLDO spoke with Cris Fletcher to confirm pt's discharge and final orders were entered by RICHARD TANG

## 2021-04-01 NOTE — PHYSICAL THERAPY NOTE
PHYSICAL THERAPY TREATMENT NOTE - INPATIENT     Room Number: 407/407-A       Presenting Problem: revision C5-7 ACDF; dysphagia; cervical myelopathy    Problem List  Principal Problem:    Cervical myelopathy ([address]  Active Problems:    OAB (overactive bladde Sitting: Good           Static Standing: Fair +  Dynamic Standing: Fair -    ACTIVITY TOLERANCE                         O2 WALK                  AM-PAC '6-Clicks' INPATIENT SHORT FORM - BASIC MOBILITY  How much difficulty does the patient currently have. Kina Spar Kina Spar negotiate 5 stairs/one curb w/ assistive device and supervision   Goal #4   Current Status Navigated 4 stairs with Min a   Goal #5 Patient to demonstrate independence with home activity/exercise instructions provided to patient in preparation for discharge

## 2021-04-01 NOTE — PROGRESS NOTES
[facility]     Progress Note         Patient Status:  Inpatient    1940 MRN [id number]   Location [facility] 2W/SW Attending Toni Resendiz MD   Hosp Day # 10 PCP Magy Canseco MD       Subjective:   Patient se Intravenous, Q2H PRN  Senna (SENOKOT) tab 17.2 mg, 17.2 mg, Oral, Nightly  docusate sodium (COLACE) cap 100 mg, 100 mg, Oral, BID  PEG 3350 (MIRALAX) powder packet 17 g, 17 g, Oral, Daily PRN  magnesium hydroxide (MILK OF MAGNESIA) 400 MG/5ML suspension 30 cervical deep wound exploration and culture on 3/23/2021  -Worsening respiratory distress and stridor evident this morning.    Evidence of worsening upper airway inflammation.   Intubated for airway protection on 3/24/2021.    -Tolerating extubation on 3/27

## 2021-04-01 NOTE — PROGRESS NOTES
[facility]    Ortho Medical Progress Note     Leila Kay Patient Status:  Inpatient    1940 MRN [id number]   Location [facility] 4W/SW/SE Attending Seema Murrieta MD   Hosp Day # 10 PCP Radha Alcantara MD       Subjective: degeneration        Essential hypertension        Osteopenia        Venous insufficiency        Vitamin D insufficiency        Obstructive sleep apnea        Bilateral carotid artery disease, unspecified type ([facility]        Dysphagia    Results:     Lab Resu

## 2021-04-01 NOTE — PLAN OF CARE
Problem: Patient Centered Care  Goal: Patient preferences are identified and integrated in the patient's plan of care  Description: Interventions:  - What would you like us to know as we care for you?  I have two sons  - Provide timely, complete, and accu effects  - Notify MD/LIP if interventions unsuccessful or patient reports new pain  - Anticipate increased pain with activity and pre-medicate as appropriate  4/1/2021 1301 by Jill Barrera RN  Outcome: Adequate for Discharge  4/1/2021 3980 by Agnes Chi small sips of liquid  - Offer pills one at a time, crush or deliver with applesauce as needed  - Discontinue feeding and notify MD (or speech pathologist) if coughing or persistent throat clearing or wet/gurgly vocal quality is noted  4/1/2021 1301 by Jim with wheeled walker. Jorge Vincent set up including IV abx and PT OT and speech therapy. Siva Zeng contacted regarding home health, notified that patient received ertapenem at 1100. Q 24 hour dose. Discharge home on purree diet with honey thickened-liquids.  PICC medardo

## 2021-04-05 ENCOUNTER — LAB REQUISITION (OUTPATIENT)
Dept: LAB | Facility: HOSPITAL | Age: 81
End: 2021-04-05
Payer: MEDICARE

## 2021-04-05 DIAGNOSIS — B95.4 OTHER STREPTOCOCCUS AS THE CAUSE OF DISEASES CLASSIFIED ELSEWHERE: ICD-10-CM

## 2021-04-05 DIAGNOSIS — B96.89 OTHER SPECIFIED BACTERIAL AGENTS AS THE CAUSE OF DISEASES CLASSIFIED ELSEWHERE: ICD-10-CM

## 2021-04-05 DIAGNOSIS — M46.22 OSTEOMYELITIS OF VERTEBRA, CERVICAL REGION (HCC): ICD-10-CM

## 2021-04-05 PROCEDURE — 80048 BASIC METABOLIC PNL TOTAL CA: CPT | Performed by: INTERNAL MEDICINE

## 2021-04-05 PROCEDURE — 85652 RBC SED RATE AUTOMATED: CPT | Performed by: INTERNAL MEDICINE

## 2021-04-05 PROCEDURE — 86140 C-REACTIVE PROTEIN: CPT | Performed by: INTERNAL MEDICINE

## 2021-04-05 PROCEDURE — 85025 COMPLETE CBC W/AUTO DIFF WBC: CPT | Performed by: INTERNAL MEDICINE

## 2021-04-12 ENCOUNTER — LAB REQUISITION (OUTPATIENT)
Dept: LAB | Facility: HOSPITAL | Age: 81
End: 2021-04-12
Payer: MEDICARE

## 2021-04-12 DIAGNOSIS — B95.4 OTHER STREPTOCOCCUS AS THE CAUSE OF DISEASES CLASSIFIED ELSEWHERE: ICD-10-CM

## 2021-04-12 DIAGNOSIS — B96.89 OTHER SPECIFIED BACTERIAL AGENTS AS THE CAUSE OF DISEASES CLASSIFIED ELSEWHERE: ICD-10-CM

## 2021-04-12 DIAGNOSIS — M46.22 OSTEOMYELITIS OF VERTEBRA, CERVICAL REGION (HCC): ICD-10-CM

## 2021-04-12 PROCEDURE — 80048 BASIC METABOLIC PNL TOTAL CA: CPT | Performed by: INTERNAL MEDICINE

## 2021-04-12 PROCEDURE — 85025 COMPLETE CBC W/AUTO DIFF WBC: CPT | Performed by: INTERNAL MEDICINE

## 2021-04-12 PROCEDURE — 86140 C-REACTIVE PROTEIN: CPT | Performed by: INTERNAL MEDICINE

## 2021-04-12 PROCEDURE — 85652 RBC SED RATE AUTOMATED: CPT | Performed by: INTERNAL MEDICINE

## 2021-04-19 ENCOUNTER — LAB REQUISITION (OUTPATIENT)
Dept: LAB | Facility: HOSPITAL | Age: 81
End: 2021-04-19
Payer: MEDICARE

## 2021-04-19 DIAGNOSIS — B96.89 OTHER SPECIFIED BACTERIAL AGENTS AS THE CAUSE OF DISEASES CLASSIFIED ELSEWHERE: ICD-10-CM

## 2021-04-19 DIAGNOSIS — B95.4 OTHER STREPTOCOCCUS AS THE CAUSE OF DISEASES CLASSIFIED ELSEWHERE: ICD-10-CM

## 2021-04-19 DIAGNOSIS — M46.22 OSTEOMYELITIS OF VERTEBRA, CERVICAL REGION (HCC): ICD-10-CM

## 2021-04-19 PROCEDURE — 85652 RBC SED RATE AUTOMATED: CPT | Performed by: INTERNAL MEDICINE

## 2021-04-19 PROCEDURE — 85025 COMPLETE CBC W/AUTO DIFF WBC: CPT | Performed by: INTERNAL MEDICINE

## 2021-04-19 PROCEDURE — 86140 C-REACTIVE PROTEIN: CPT | Performed by: INTERNAL MEDICINE

## 2021-04-19 PROCEDURE — 80048 BASIC METABOLIC PNL TOTAL CA: CPT | Performed by: INTERNAL MEDICINE

## 2021-04-23 ENCOUNTER — HOSPITAL ENCOUNTER (INPATIENT)
Facility: HOSPITAL | Age: 81
LOS: 11 days | Discharge: HOME | DRG: 559 | End: 2021-05-04
Attending: EMERGENCY MEDICINE | Admitting: INTERNAL MEDICINE
Payer: MEDICARE

## 2021-04-23 ENCOUNTER — APPOINTMENT (OUTPATIENT)
Dept: GENERAL RADIOLOGY | Facility: HOSPITAL | Age: 81
DRG: 559 | End: 2021-04-23
Attending: EMERGENCY MEDICINE
Payer: MEDICARE

## 2021-04-23 DIAGNOSIS — T17.908A ASPIRATION INTO AIRWAY, INITIAL ENCOUNTER: ICD-10-CM

## 2021-04-23 DIAGNOSIS — R13.10 DYSPHAGIA, UNSPECIFIED TYPE: Primary | ICD-10-CM

## 2021-04-23 PROCEDURE — 71045 X-RAY EXAM CHEST 1 VIEW: CPT | Performed by: EMERGENCY MEDICINE

## 2021-04-23 RX ORDER — METHYLPREDNISOLONE SODIUM SUCCINATE 40 MG/ML
40 INJECTION, POWDER, LYOPHILIZED, FOR SOLUTION INTRAMUSCULAR; INTRAVENOUS EVERY 8 HOURS
Status: DISCONTINUED | OUTPATIENT
Start: 2021-04-23 | End: 2021-04-25

## 2021-04-23 RX ORDER — FAMOTIDINE 10 MG/ML
20 INJECTION, SOLUTION INTRAVENOUS 2 TIMES DAILY
Status: DISCONTINUED | OUTPATIENT
Start: 2021-04-23 | End: 2021-05-04

## 2021-04-23 RX ORDER — SODIUM CHLORIDE 9 MG/ML
INJECTION, SOLUTION INTRAVENOUS CONTINUOUS
Status: DISCONTINUED | OUTPATIENT
Start: 2021-04-23 | End: 2021-04-24

## 2021-04-23 RX ORDER — LORAZEPAM 2 MG/ML
0.5 INJECTION INTRAMUSCULAR EVERY 6 HOURS PRN
Status: DISCONTINUED | OUTPATIENT
Start: 2021-04-23 | End: 2021-05-04

## 2021-04-23 RX ORDER — ENOXAPARIN SODIUM 100 MG/ML
40 INJECTION SUBCUTANEOUS NIGHTLY
Status: DISCONTINUED | OUTPATIENT
Start: 2021-04-23 | End: 2021-04-24

## 2021-04-23 RX ORDER — LORAZEPAM 2 MG/ML
1 INJECTION INTRAMUSCULAR EVERY 6 HOURS PRN
Status: DISCONTINUED | OUTPATIENT
Start: 2021-04-23 | End: 2021-04-25

## 2021-04-23 NOTE — ED INITIAL ASSESSMENT (HPI)
Pt sent in from outpatient swallow study for possible aspiration during test. Recently had spine surgery here. Denies cough, CP/SOB, N/V. RR even and nonlabored, speaking in full sentences.

## 2021-04-23 NOTE — ED PROVIDER NOTES
Patient Seen in: [facility] AND [facility] Emergency Department      History   Patient presents with:  Aspiration    Stated Complaint:     HPI/Subjective:    HPI    The patient is an 17-year-old female status post washout of cervical spine abscess at the end of [facility] @ [place]   •      • CATARACT Bilateral     Dec 2018 (R), 2019 (L)   • CHOLECYSTECTOMY  3/19/15     Laparoscopic by Dr. Elsa Sifuentes   • COLONOSCOPY  6/3/14    colon polyp and EGD done too.    • COLONOSCOPY  2019   • EGD  2019    gastr Abdominal:      General: Bowel sounds are normal.      Palpations: Abdomen is soft. There is no mass. Tenderness: There is no abdominal tenderness. Musculoskeletal:         General: Normal range of motion.       Cervical back: Normal range of motio pulmonary who did not feel that there was significant risk for pneumonitis after aspirating barium from her esophagram.  Patient's oxygen normal without dyspnea in the ED       MDM      Pulse Ox: 99%, Normal, room air    Cardiac Monitor: Pulse Readings fro 6/17/2014 Yes    Osteopenia M85.80 8/4/2015 Yes    Spinal stenosis M48.00 6/17/2014 Yes    Vitamin B12 deficient megaloblastic anemia D53.1 6/17/2014 Yes    Vitamin D insufficiency E55.9 3/6/2017 Yes

## 2021-04-23 NOTE — H&P
[facility]    PRE-OP NOTE    HPI:      Luc Rich is post op cervical spine revision on 3/22 but Dr. Geovanny Oconnor was unable to perform due to scar tissue and apparent abscess. She had a complicated post op course.     At home she managed fine for famoTIDine 20 MG Oral Tab Take 20 mg by mouth daily.      • mirtazapine 30 MG Oral Tab TAKE 1 TABLET BY MOUTH AT NIGHT 90 tablet 0   • LEVOTHYROXINE SODIUM 25 MCG Oral Tab TAKE 1 TABLET BY MOUTH IN THE MORNING BEFORE BREAKFAST 90 tablet 0   • VESICARE 5 MG 38 RDI 61 REM AHI 54 Supine AHI 38 non-supine AHI 0 Sao2 Skip 65% autoPAP 6-16 HME   • Other and unspecified hyperlipidemia    • PONV (postoperative nausea and vomiting)     severe nausea and ill feeling for weeks after surgery   • Problems with swallowin Activity:       Days of Exercise per Week:       Minutes of Exercise per Session:   Stress:       Feeling of Stress :   Social Connections:       Frequency of Communication with Friends and Family:       Frequency of Social Gatherings with Friends and Fami atraumatic Eyes: EOMI, PERRLA, no scleral icterus, conjunctivae clear bilaterally, no eye discharge Nose: patent, no nasal discharge Throat:  No tonsillar erythema or exudate.     Noisy respiration but mucous in throat  Mouth:  Red patches on upper palate a PLAN:   Panchito Jin is a 78year old female, with a hx of respiratory stridor post op in March. Currently she is being admitted to [facility] for IV steroid administration and continuation of IV antibiotics. She needs speech therapy for swallowing.     @ course. CHU Lovelace  4/23/2021  5:23 PM

## 2021-04-23 NOTE — SLP NOTE
SLP orders received and acknowledged. Chart reviewed. Pt known to the SLP inpatient department.      SLP assisted ED RN, , and MD with patient who was initially admitted from [facility] 120 d/t aspiration on an Esophagram. Pt was th and patient did have normal oxygen saturation immediately and after 1 hour after the procedure.            IMPRESSION:          Very limited procedure as patient aspirated fairly quickly therefore the procedure was halted.  She was in stable condition with

## 2021-04-23 NOTE — ED QUICK NOTES
Orders for admission, patient is aware of plan and ready to go upstairs. Any questions, please call ED RN Eddie Duff  at extension [phone].    Type of COVID test sent:Rapid  COVID Suspicion level: Low    Titratable drug(s) infusing:none      LOC at time of transpor

## 2021-04-24 ENCOUNTER — APPOINTMENT (OUTPATIENT)
Dept: CT IMAGING | Facility: HOSPITAL | Age: 81
DRG: 559 | End: 2021-04-24
Attending: INTERNAL MEDICINE
Payer: MEDICARE

## 2021-04-24 PROCEDURE — 70491 CT SOFT TISSUE NECK W/DYE: CPT | Performed by: INTERNAL MEDICINE

## 2021-04-24 RX ORDER — POTASSIUM CHLORIDE AND SODIUM CHLORIDE 450; 150 MG/100ML; MG/100ML
INJECTION, SOLUTION INTRAVENOUS CONTINUOUS
Status: DISCONTINUED | OUTPATIENT
Start: 2021-04-24 | End: 2021-04-25

## 2021-04-24 RX ORDER — VANCOMYCIN HYDROCHLORIDE 125 MG/1
125 CAPSULE ORAL DAILY
Status: DISCONTINUED | OUTPATIENT
Start: 2021-04-24 | End: 2021-04-25

## 2021-04-24 RX ORDER — HYDRALAZINE HYDROCHLORIDE 20 MG/ML
5 INJECTION INTRAMUSCULAR; INTRAVENOUS
Status: DISCONTINUED | OUTPATIENT
Start: 2021-04-24 | End: 2021-05-04

## 2021-04-24 NOTE — PROGRESS NOTES
Therapeutic interchange from ertapenem to meropenem per P&T approved protocol.      Thank you,  Frank Chirinos, PharmD

## 2021-04-24 NOTE — CONSULTS
[facility] - [facility] ID CONSULT NOTE    Virginie Omer Patient Status:  Inpatient    1940 MRN [id number]   Location [facility] 5SW/SE Attending Loree Mcduffie MD   Hosp Day # 1 PCP Julisa Mims DO       Reason for Consu (postoperative nausea and vomiting)     severe nausea and ill feeling for weeks after surgery   • Problems with swallowing    • Sleep apnea     has c-pap.  not using presently   • Tremor    • Vitamin D insufficiency 3/6/2017     Past Surgical History:   Pro Intravenous, Q8H    Review of Systems:  CONSTITUTIONAL:  No weight loss, +weakness, fatigue  HEENT:  Eyes:  No visual loss, blurred vision, double vision or yellow sclerae.  Ears, Nose, Throat:  No hearing loss, sneezing, congestion, runny nose or sore thro 28.0 26.0   ALKPHO  --   --  62   AST  --   --  10*   ALT  --   --  12*   BILT  --   --  0.6   TP  --   --  6.6       Microbiology: Reviewed in EMR    Radiology: Reviewed    ASSESSMENT:    Antibiotics: Meropenem   (invanz PTA)    78year old female with a [facility]  [phone]  4/24/2021

## 2021-04-24 NOTE — RESPIRATORY THERAPY NOTE
Patient has a history of sleep apnea and has used a CPAP in the past. Per the son, a physician has instructed her not to use it at this time. A nasal cannula will be used while sleeping instead of CPAP.

## 2021-04-24 NOTE — PLAN OF CARE
Problem: Patient Centered Care  Goal: Patient preferences are identified and integrated in the patient's plan of care  Description: Interventions:  - What would you like us to know as we care for you?  I live with my son and I had recent spine surgery a cough, deep breathe, Incentive Spirometry  - Assess the need for suctioning and perform as needed  - Assess and instruct to report SOB or any respiratory difficulty  - Respiratory Therapy support as indicated  - Manage/alleviate anxiety  - Monitor for sign RN  Outcome: Progressing    Problem: Impaired Functional Mobility  Goal: Achieve highest/safest level of mobility/gait  Description: Interventions:  - Assess patient's functional ability and stability  - Promote increasing activity/tolerance for mobility a

## 2021-04-24 NOTE — DIETARY NOTE
ADULT NUTRITION INITIAL ASSESSMENT    Pt is at high nutrition risk. Pt meets severe malnutrition criteria.       CRITERIA FOR MALNUTRITION DIAGNOSIS:  Criteria for severe malnutrition diagnosis: chronic illness related to wt loss greater than 10% in 6 carri difficult to meet needs this way. Noted 44# (20%) wt loss in 6 months. Discussed recommendation for PEG with family, answered questions. Emphasized this would alleviate stress of force self to eat, wt loss, and help pt focus on strengthen swallowing.  Discu obesity class I  IBW: 120 lbs        145% IBW  Usual Body Wt: 215 lbs       81% UBW    WEIGHT HISTORY:  Patient Weight(s) for the past 336 hrs:   Weight   04/23/21 1438 79.4 kg (175 lb)     Wt Readings from Last 10 Encounters:  04/23/21 : 79.4 kg (175 lb) and TF initiation within 48 hrs    DIETITIAN FOLLOW UP: RD to follow and monitor nutrition status    Shaan Goetz  MS, [address] [facility], C/ Marvin Saunders

## 2021-04-24 NOTE — CONSULTS
[facility]    Report of Consultation    Neftali Abrams Patient Status:  Inpatient    1940 MRN [id number]   Location [facility] 5SW/SE Attending Beto Ceja MD   Hosp Day # 1 PCP Savita Conte DO     Date of Admission:   Sao2 Skip 65% autoPAP 6-16 HME   • Other and unspecified hyperlipidemia    • PONV (postoperative nausea and vomiting)     severe nausea and ill feeling for weeks after surgery   • Problems with swallowing    • Sleep apnea     has c-pap.  not using presentl Q8H  LORazepam (ATIVAN) injection 0.5 mg, 0.5 mg, Intravenous, Q6H PRN   Or  LORazepam (ATIVAN) injection 1 mg, 1 mg, Intravenous, Q6H PRN  famoTIDine (PEPCID) injection 20 mg, 20 mg, Intravenous, BID  Meropenem (MERREM) 500 mg in sodium chloride 0.9% 100 4/23/2021)  LOSARTAN POTASSIUM 50 MG Oral Tab, Take 1 tablet by mouth once daily (Patient not taking: Reported on 4/23/2021)  LOVASTATIN 20 MG Oral Tab, Take 1 tablet by mouth in the evening (Patient not taking: Reported on 4/23/2021)        Allergies    S 12 (L) 04/24/2021    PTT 29.7 03/16/2021    INR 1.11 03/16/2021    PTP 14.1 03/16/2021    TSH 3.080 03/16/2021    ESRML 12 04/24/2021    CRP 1.37 (H) 04/19/2021    MG 1.9 03/25/2021    PHOS 3.6 03/25/2021    TROP <0.017 07/27/2015    B12 >2,000 (H) 06/01/2 HYPOPHARYNX: There is layering fluid in the valleculae and pyriform sinuses. LARYNX: The epiglottis is mildly thickened, new from the prior exam.  The vocal cords are difficult to discern, likely due to symmetric edema.   There is new subcutaneous fat stran extends to involve the laryngeal structures with thickening of the epiglottis and symmetric thickening of the vocal cords, the latter of which are poorly delineated. There is layering fluid in the oropharynx and hypopharynx.   However, the fluid collection [facility], CT SOFT TISSUE OF NECK (CONTRAST ONLY) (CPT=70491), 3/25/2021, 9:37 PM.  [facility], XR CHEST AP PORTABLE (CPT=71045), 3/27/2021, 7:13 AM.  INDICATIONS: Rule out possible aspiration during swallow study today.   TECHNIQUE:   Single BONES: Status post anterior cervical fusion. OTHER: Endotracheal tube projects proximal to the pamela. Nasogastric feeding tube projects in the stomach. Left PICC line projects in the SVC. CONCLUSION:  1.  Mild left basilar subsegmental atelectasi with abscess formation. Now with progressive dysphagia, gross aspiration on esophagram and admitted for treatment of aspiration pneumonia and PEG placement.     Recommendations:  I spoke with the patient and her son at the bedside at length about the PEG p

## 2021-04-24 NOTE — SLP NOTE
ADULT SWALLOWING EVALUATION    ASSESSMENT    ASSESSMENT/OVERALL IMPRESSION:  PPE: DROPLET MASK AND GLOVES. HAND SANITIZED UPON ENTRANCE/EXIT. SLP BSSE orders received and acknowledged.  A swallow evaluation warranted as pt with known hx of dysphagia, re following PO trials. At this time, pt presents with mild oral dysphagia and probable severe pharyngeal dysfunction. Additionally, pt and son report minimal PO intake and concern for malnutrition/dehydration, as pt has lost 30#s.  Dietary c/w pt/family pr Anxiety    Macular degeneration    Essential hypertension    Osteopenia    Vitamin D insufficiency    Dysphagia    Aspiration into airway    Aspiration into airway, initial encounter    Past Medical History  Past Medical History:   Diagnosis Date   • Anest Functional Limits  Tone: Within Functional Limits  Range of Motion: Within Functional Limits  Rate of Motion: Within Functional Limits    Voice Quality: Breathy;Weak;Hoarse  Respiratory Status: Supplemental O2;Nasal cannula (2)  Consistencies Trialed: [employer]

## 2021-04-24 NOTE — CONSULTS
Pulmonary H&P/Consult       NAME: Kory Monaco [place]: [id number] - MRN: [id number] - Age: 78year old - :  1940    Date of Admission: 2021  2:39 PM  Admission Diagnosis: Aspiration into airway, initial encounter [T17.908A]  Dysphagia, unspeci C5-7 cervical fusion with Dr. Kennedy Organ @ [place]   •      • CATARACT Bilateral     Dec 2018 (R), 2019 (L)   • CHOLECYSTECTOMY  3/19/15     Laparoscopic by Dr. Maria Fernanda Jordan   • COLONOSCOPY  6/3/14    colon polyp and EGD done too.    • COLONOSCOPY   Rfl: , 4/22/2021 at Unknown time  Multiple Vitamins-Minerals (MULTI FOR HER 50+) Oral Tab, Take by mouth.  , Disp: , Rfl:   Cholecalciferol (VITAMIN D) 1000 UNITS Oral Cap, Take by mouth.  , Disp: , Rfl:   Vitamin B-12 (VITAMIN B12) 1000 MCG Oral Tab, Take Difficulty of Paying Living Expenses:   Food Insecurity:       Worried About [address] in the Last Year:       Ran Out of Food in the Last Year:   Transportation Needs:       Lack of Transportation (Medical):       Lack of Transportation (Non-Med Carb-Cholecalciferol (CALTRATE 600+D3 SOFT OR), Take by mouth.  , Disp: , Rfl:   MEGARED OMEGA-3 KRILL OIL OR, Take by mouth.  , Disp: , Rfl:   FOLIC ACID OR, Take 1 tablet by mouth daily.   , Disp: , Rfl:   Probiotic Product (Aamir), [facility] (36.6 °C) 97.6 °F (36.4 °C) 97.1 °F (36.2 °C)   TempSrc:  Oral Oral Oral   SpO2: 100% 99% 99% 99%   Weight:       Height:           Oxygen Therapy  SpO2: 99 %  O2 Device: Nasal cannula  O2 Flow Rate (L/min): 2 L/min  Pulse Oximetry Type: Continuous  Oximet off presently  3.  HOLA  -APAP 14-20 previously, has not tolerated it since issues w/ her neck began  -O2 PRN                   Mak Butt  [facility] Pulmonary and Critical Care

## 2021-04-24 NOTE — PLAN OF CARE
No acute changes during shift. Son present at the bedside. Plan of care updated with patient and family.     Problem: Patient Centered Care  Goal: Patient preferences are identified and integrated in the patient's plan of care  Description: Interventions: perform as needed  - Assess and instruct to report SOB or any respiratory difficulty  - Respiratory Therapy support as indicated  - Manage/alleviate anxiety  - Monitor for signs/symptoms of CO2 retention  Outcome: Progressing     Problem: METABOLIC/FLUID A assessment.  - Educate pt/family on patient safety including physical limitations  - Instruct pt to call for assistance with activity based on assessment  - Modify environment to reduce risk of injury  - Provide assistive devices as appropriate  - Consider

## 2021-04-24 NOTE — PROGRESS NOTES
[facility]    Progress Note    Nidia Balbuena Patient Status:  Inpatient    1940 MRN [id number]   Location [facility] 5SW/SE Attending Radha Mahmood MD   Hosp Day # 1 PCP Kt Waller DO       Subjective:   Nidia Balbuena i Osteopenia        Vitamin D insufficiency        Aspiration into airway  NPO              Results:     Lab Results   Component Value Date    WBC 8.8 04/24/2021    HGB 12.7 04/24/2021    HCT 40.2 04/24/2021    .0 04/24/2021    CREATSERUM 0.59 04/24/2

## 2021-04-24 NOTE — PROGRESS NOTES
[address] [facility]    CDI Prediction Tool Protocol (Vancomycin Initiated)    OVP (oral vancomycin prophylaxis) 125 mg PO Daily is being started in this patient based on a score of 17.1.       Score Breakdown:  History of CDI > 1 year ago AND high risk an

## 2021-04-25 ENCOUNTER — APPOINTMENT (OUTPATIENT)
Dept: GENERAL RADIOLOGY | Facility: HOSPITAL | Age: 81
DRG: 559 | End: 2021-04-25
Attending: INTERNAL MEDICINE
Payer: MEDICARE

## 2021-04-25 ENCOUNTER — APPOINTMENT (OUTPATIENT)
Dept: CT IMAGING | Facility: HOSPITAL | Age: 81
DRG: 559 | End: 2021-04-25
Attending: INTERNAL MEDICINE
Payer: MEDICARE

## 2021-04-25 PROCEDURE — 74220 X-RAY XM ESOPHAGUS 1CNTRST: CPT | Performed by: INTERNAL MEDICINE

## 2021-04-25 PROCEDURE — 71260 CT THORAX DX C+: CPT | Performed by: INTERNAL MEDICINE

## 2021-04-25 RX ORDER — DEXTROSE AND POTASSIUM CHLORIDE 5; .15 G/100ML; G/100ML
SOLUTION INTRAVENOUS CONTINUOUS
Status: DISCONTINUED | OUTPATIENT
Start: 2021-04-25 | End: 2021-04-25 | Stop reason: ALTCHOICE

## 2021-04-25 NOTE — PROGRESS NOTES
[facility] AND [facility]  Gastroenterology Progress Note    Panchito Jin Patient Status:  Inpatient    1940 MRN [id number]   Location [facility] 5SW/SE Attending Leana Cowan MD   Hosp Day # 2 PCP Matthew Bullock DO     Subjective:  Panchito Jin (CST): Tank Gaytan MD on 4/24/2021 at 4:52 PM     Finalized by (CST): Tank Gaytan MD on 4/24/2021 at 5:21 PM          XR CHEST AP PORTABLE  (CPT=71045)    Result Date: 4/23/2021  CONCLUSION:  1.  Scattered oral contrast material within the region of aspiration during her last attempted esophagram at [facility] 2 days ago. Will use Gastrografin contrast for this study.   However if patient cannot drink sufficient amounts of contrast for this study she will be taken directly to chest CT for attempted three-dim

## 2021-04-25 NOTE — PROGRESS NOTES
[facility]    Progress Note    David Phelps Patient Status:  Inpatient    1940 MRN [id number]   Location [facility] 5SW/SE Attending Nikita Knox MD   Hosp Day # 2 PCP Boby Sanchez DO       Subjective:   David Phelps i stenosis        Vitamin B12 deficient megaloblastic anemia  Will start TPN       Anxiety  ON IV ativan prn      Macular degeneration        Essential hypertension  PRN hydralazine      Osteopenia        Vitamin D insufficiency        Aspiration into airway C5 through C7.      Dictated by (CST): Jennifer Campos MD on 4/24/2021 at 4:52 PM     Finalized by (CST): Jennifer Campos MD on 4/24/2021 at 5:21 PM          XR CHEST AP PORTABLE  (CPT=71045)    Result Date: 4/23/2021  CONCLUSION:  1.  Scattered oral contrast

## 2021-04-25 NOTE — PLAN OF CARE
Problem: Patient Centered Care  Goal: Patient preferences are identified and integrated in the patient's plan of care  Description: Interventions:  - What would you like us to know as we care for you?  I live with my son and I had recent spine surgery her rhythm and assess patient for signs and symptoms of electrolyte imbalances  - Administer electrolyte replacement as ordered  - Monitor response to electrolyte replacements, including rhythm and repeat lab results as appropriate  - Fluid restriction as orde IV steroid  -diagnostic evaluation per MD order  - See additional Care Plan goals for specific interventions  Outcome: Not Progressing     Problem: Impaired Swallowing  Goal: Minimize aspiration risk  Description: Interventions:  - Patient should be alert

## 2021-04-25 NOTE — PROGRESS NOTES
[facility] Pharmacy Note:  Renal Adjustment for meropenem [facility])    Hussain Dennis is a 78year old patient who has been prescribed meropenem (MERREM) 500 mg every 8 hrs. The estimated creatinine clearance is 48.4 mL/min (based on SCr of 0.76 mg/dL).  The dose

## 2021-04-25 NOTE — PLAN OF CARE
No complaints of pain. IVF infusing. TPN to be started tomorrow. Patients family at bedside. Strict NPO, oral hygiene provided. Gastrografin and CT done today. PICC intact and reinforced. Up to chair today.  Safety precautions in place and call light within handout, if applicable  - Encourage broncho-pulmonary hygiene including cough, deep breathe, Incentive Spirometry  - Assess the need for suctioning and perform as needed  - Assess and instruct to report SOB or any respiratory difficulty  - Respiratory Ther physical needs  - Identify cognitive and physical deficits and behaviors that affect risk of falls.   - [place] fall precautions as indicated by assessment.  - Educate pt/family on patient safety including physical limitations  - Instruct pt to call for a

## 2021-04-25 NOTE — CONSULTS
Thoracic Surgery Consult    Reason for Consultation: esophageal perforation    Consulting Physician: Shayna Lane    Chief Complaint: \" I can't eat.  \"    History of Present Illness: Patient is a 78year old, female Who underwent a cervical spine fusion in Oct Chloride (PF) 0.9 % IV push, 12.6 mcg, Intravenous, Q3 Days  hydrALAzine HCl (APRESOLINE) injection 5 mg, 5 mg, Intravenous, Q3H PRN  [START ON 5/2/2021] levothyroxine Sodium (SYNTHROID) 12.6 mcg in Sodium Chloride (PF) 0.9 % IV push, 12.6 mcg, Intravenous inactive gastritis with intestinal metplasia, neg for H pylori   • KNEE REPLACEMENT SURGERY  1/9/07    right   • OTHER SURGICAL HISTORY  11/30/15    laminectomy L3-L4, L4-5; right L1-L2 lateral microdiscectomy       Social History:    Social History    Tob 13.1 12.7 12.6   HCT 37.9   < > 41.2 40.2 39.9   MCV 93.3   < > 91.6 92.2 91.1   MCH 29.3   < > 29.1 29.1 28.8   MCHC 31.4   < > 31.8 31.6 31.6   RDW 14.9   < > 14.8 14.8 14.8   NEPRELIM 2.41  --  4.10  --  11.00*   WBC 4.1   < > 6.4 8.8 12.9*   .0 successful.       Plan:    NPO, Abx, IVF  UGI, Possible EGD  Ortho spine consult    Benietz Rivera MD  Thoracic Surgery  Pager: [phone]

## 2021-04-26 PROBLEM — K22.3 PERFORATION OF ESOPHAGUS: Status: ACTIVE | Noted: 2021-04-26

## 2021-04-26 PROCEDURE — 3E0436Z INTRODUCTION OF NUTRITIONAL SUBSTANCE INTO CENTRAL VEIN, PERCUTANEOUS APPROACH: ICD-10-PCS | Performed by: INTERNAL MEDICINE

## 2021-04-26 RX ORDER — MAGNESIUM SULFATE HEPTAHYDRATE 40 MG/ML
2 INJECTION, SOLUTION INTRAVENOUS ONCE
Status: COMPLETED | OUTPATIENT
Start: 2021-04-26 | End: 2021-04-26

## 2021-04-26 RX ORDER — FUROSEMIDE 10 MG/ML
20 INJECTION INTRAMUSCULAR; INTRAVENOUS ONCE
Status: COMPLETED | OUTPATIENT
Start: 2021-04-27 | End: 2021-04-27

## 2021-04-26 NOTE — PROGRESS NOTES
Thoracic Surgery Progress Note     Tracy Grandchild is a 78year old female. MRN [id number]. Admitted 4/23/2021    [facility] EVENTS:     UGI completed yesterday demonstrating midesophageal perforation with retained barium.  Subsequent CT chest with con 28.0 04/26/2021     04/26/2021    CA 9.6 04/26/2021    MG 1.8 04/26/2021    PHOS 2.2 04/26/2021         Assessment/Plan:     78year old female with esophageal perforation secondary to infected cervical hardware . UGI yesterday demonstrated midesopha

## 2021-04-26 NOTE — PROGRESS NOTES
[facility]     Medical Progress Note     Radha Todd Patient Status:  Inpatient    1940 MRN [id number]   Location [facility] 5SW/SE Attending Сергей Warren MD   Hosp Day # 3 PCP Timbo Zaman DO       Subjective:   Tushar Hammond meds      Osteopenia  stable      Vitamin D insufficiency  TPN      Aspiration into airway  Pulmonary consult but no signs of pneurmonia    Cervical abscess - iv antibiotic Unasyn 3 G    History of Cdiff - oral vancomycin not able to be used due to strict esophagus with intramural accumulation of high density barium contrast material.  The extent of the contained perforation is from approximately the T1 level to the T4  level. There is no evidence of pneumomediastinum.   2. Foci of gas in the aforementioned from T1 to about T3-4. 2. Previously drained fluid collection/abscess at T3-4 with residual inflammatory thickening, but no fluid collection/abscess. Mild thickening/inflammatory change involving the supraglottic larynx. 3. ACDF C5 through C7.      Dictate

## 2021-04-26 NOTE — PLAN OF CARE
To start TPN tonight. Ambulating well to bathroom and in sheth with therapy.   Problem: Patient Centered Care  Goal: Patient preferences are identified and integrated in the patient's plan of care  Description: Interventions:  - What would you like us to kno and instruct to report SOB or any respiratory difficulty  - Respiratory Therapy support as indicated  - Manage/alleviate anxiety  - Monitor for signs/symptoms of CO2 retention  Outcome: Progressing     Problem: METABOLIC/FLUID [address] level and precautions    Outcome: Progressing     Problem: SAFETY ADULT - FALL  Goal: Free from fall injury  Description: INTERVENTIONS:  - Assess pt frequently for physical needs  - Identify cognitive and physical deficits and behaviors that affect risk o

## 2021-04-26 NOTE — DIETARY NOTE
ADULT NUTRITION REASSESSMENT    Pt is at high nutrition risk. Pt meets severe malnutrition criteria.       CRITERIA FOR MALNUTRITION DIAGNOSIS:  Criteria for severe malnutrition diagnosis: chronic illness related to wt loss greater than 10% in 6 months and exhausting and difficult to meet needs this way. Noted 44# (20%) wt loss in 6 months. Discussed recommendation for PEG with family, answered questions.  Emphasized this would alleviate stress of force self to eat, wt loss, and help pt focus on strengthen sw levothyroxine sodium  12.6 mcg Intravenous Q3 Days   • [START ON 5/2/2021] levothyroxine sodium  12.6 mcg Intravenous Daily   • famoTIDine  20 mg Intravenous BID     LABS: reviewed;  Phos 2.2- 15mmol Kphos rider given  Recent Labs     04/24/21  0636 04/25/2 (20-25kcal/kg/day) using current weight  Protein:  grams protein/day (1.5-2 grams protein per kg Ideal body wt (IBW))  Fluid needs: 1 ml/kcal or per clinical status    NUTRITION INTERVENTION:  - Diet: NPO  - RD Malnutrition Care Plan: See JLUIS nutritio

## 2021-04-26 NOTE — PROGRESS NOTES
[facility] Pharmacy Note:  Renal Adjustment for ampicillin/sulbactam (UNASYN)    Yen Lala is a 78year old patient who has been prescribed ampicillin/sulbactam (UNASYN) 3000 mg every 8 hrs.   The estimated creatinine clearance is 48.4 mL/min (based on SCr of

## 2021-04-26 NOTE — PLAN OF CARE
Problem: Patient Centered Care  Goal: Patient preferences are identified and integrated in the patient's plan of care  Description: Interventions:  - What would you like us to know as we care for you?  I live with my son and I had recent spine surgery her should be alert and upright for all feedings (90 degrees preferred)  - Offer food and liquids at a slow rate  - No straws  - Encourage small bites of food and small sips of liquid  - Offer pills one at a time, crush or deliver with applesauce as needed  - dental floss  - Use electric shaver for shaving  - Use soft bristle tooth brush  - Limit straining and forceful nose blowing  Outcome: Progressing     Problem: Patient/Family Goals  Goal: Patient/Family Long Term Goal  Description: Patient's Long Term Goal

## 2021-04-26 NOTE — OCCUPATIONAL THERAPY NOTE
OCCUPATIONAL THERAPY EVALUATION - INPATIENT     Room Number: 554/554-A  Evaluation Date: 4/26/2021  Type of Evaluation: Initial  Presenting Problem: aspiration pna    Physician Order: IP Consult to Occupational Therapy  Reason for Therapy: ADL/IADL Dysfunc education; Compensatory technique education; Energy conservation/work simplification techniques; Endurance training       OCCUPATIONAL THERAPY MEDICAL/SOCIAL HISTORY     Problem List  Principal Problem:    Dysphagia, unspecified type  Active Problems:    OAB REPLACEMENT SURGERY  1/9/07    right   • OTHER SURGICAL HISTORY  11/30/15    laminectomy L3-L4, L4-5; right L1-L2 lateral microdiscectomy       HOME SITUATION  Type of Home: House  Home Layout: Multi-level  Lives With: Son;Family (2 sons)     Toilet and Eq using toilet, bedpan or urinal? : A Little  -   Putting on and taking off regular upper body clothing?: None  -   Taking care of personal grooming such as brushing teeth?: None  -   Eating meals?: None    AM-PAC Score:  Score: 21  Approx Degree of Impairme

## 2021-04-26 NOTE — PROGRESS NOTES
INFECTIOUS DISEASE PROGRESS NOTE  [facility] BOB ID PROGRESS NOTE    Juan Goyal Patient Status:  Inpatient    1940 MRN [id number]   Location [facility] 5SW/SE Attending Autumn Vila MD   Hosp Day # 3 PCP Bart Matias testing     Dysphagia     Aspiration into airway     Dysphagia, unspecified type     Aspiration into airway, initial encounter      ASSESSMENT:    Antibiotics: Unasyn  (invanz PTA, meropenem)     78year old female with a history of spinal stenosis, HTN, O

## 2021-04-26 NOTE — PROGRESS NOTES
Pulmonary Progress Note     Assessment / Plan:  1. Aspiration pneumonitis  - unasyn  2. HOLA  - APAP 14-20 previously, has not tolerated it since issues w/ her neck began  - monitor  3.  Esophageal perforation  - per surgical services and GI      Subjective:

## 2021-04-26 NOTE — PROGRESS NOTES
[facility] - [facility]    Progress Note    Lafrances Sleeper Patient Status:  Inpatient    1935 MRN [id number]   Location [facility] 3W/SW Attending Yolanda Tsai MD   Hosp Day # 3 PCP Saul Bauer DO     Date of Admission:  2021  SUBJECT daily. (Patient taking differently: Take 10 mg by mouth daily.  )  Multiple Vitamins-Minerals (PRESERVISION AREDS) Oral Tab, Take by mouth. Calcium Carb-Cholecalciferol (CALTRATE 600+D3 SOFT OR), Take by mouth.     MEGARED OMEGA-3 KRILL OIL OR, Take by m auscultation bilaterally of anterior chest  Cardiovascular: regular rate and rhythm  Abdominal: soft, non-tender; bowel sounds normal  Extremities: extremities normal, atraumatic, no cyanosis or edema  Skin: Skin color, texture, turgor normal. No rashes or perforation raise the possibility of superimposed infection. 3. Notably, there is nonspecific bilateral periesophageal fluid adjacent to the aforementioned contained perforation, which may be reactive/inflammatory in nature.  These do not appear loculated by (CST): Shyla Scott MD on 4/24/2021 at 4:52 PM     Finalized by (CST): Shyla Scott MD on 4/24/2021 at 5:21 PM          Assessment/Plan:    Patient is a 78year old female who was admitted to the hospital for Dysphagia, unspecified type:  Patient Ac

## 2021-04-26 NOTE — SLP NOTE
SLP f/u with ongoing dysphagia intervention. RN reports anticipated PEG placement canceled. Pt remains strict NPO and TPN to be initiated.      RN reports medical team determining most appropriate POC at this time as patient with esophageal perforation asso

## 2021-04-27 ENCOUNTER — APPOINTMENT (OUTPATIENT)
Dept: PICC SERVICES | Facility: HOSPITAL | Age: 81
DRG: 559 | End: 2021-04-27
Attending: NURSE PRACTITIONER
Payer: MEDICARE

## 2021-04-27 PROBLEM — T17.908A ASPIRATION INTO AIRWAY: Status: RESOLVED | Noted: 2021-04-23 | Resolved: 2021-04-27

## 2021-04-27 PROBLEM — Z01.818 PREOP TESTING: Status: RESOLVED | Noted: 2021-03-22 | Resolved: 2021-04-27

## 2021-04-27 PROCEDURE — 02HV33Z INSERTION OF INFUSION DEVICE INTO SUPERIOR VENA CAVA, PERCUTANEOUS APPROACH: ICD-10-PCS | Performed by: INTERNAL MEDICINE

## 2021-04-27 NOTE — CM/SW NOTE
04/27/21 1100   Readmission Assessment   Factors that patient feels contributed to this readmission   (unable to swallow, aspiration on outpt exam)   Pt. received education on diagnoses at time of discharge?  Yes   Did you know who and how to call someon

## 2021-04-27 NOTE — PROGRESS NOTES
[facility]    Ortho Medical Progress Note     Radha Todd Patient Status:  Inpatient    1940 MRN [id number]   Location [facility] 5SW/SE Attending Сергей Warren MD   Hosp Day # 4 PCP Timbo Zaman DO       Subjective:   A hypertension        Osteopenia        Vitamin D insufficiency        Aspiration into airway        Aspiration into airway, initial encounter        Perforation of esophagus -  With cardio vascular and ortho surgery to evaluate next steps.   For now will elba

## 2021-04-27 NOTE — PLAN OF CARE
Problem: Patient Centered Care  Goal: Patient preferences are identified and integrated in the patient's plan of care  Description: Interventions:  - What would you like us to know as we care for you?  I live with my son and I had recent spine surgery her should be alert and upright for all feedings (90 degrees preferred)  - Offer food and liquids at a slow rate  - No straws  - Encourage small bites of food and small sips of liquid  - Offer pills one at a time, crush or deliver with applesauce as needed  - tooth brush  - Limit straining and forceful nose blowing  Outcome: Progressing     Problem: Patient/Family Goals  Goal: Patient/Family Long Term Goal  Description: Patient's Long Term Goal: able to swallow food     Interventions:  - monitor vital sign and

## 2021-04-27 NOTE — PROGRESS NOTES
Thoracic Surgery Progress Note     Marla Rivas is a 78year old female. MRN [id number]. Admitted 4/23/2021    [address] [place] [address] EVENTS:     No acute events overnight.    Afebrile, WBC downtrending, WNL at 8.3 from 11.5 yesterday  Feeling better today  No 04/27/2021     04/27/2021    CA 9.9 04/27/2021    CA 9.9 04/27/2021    ALB 3.0 04/27/2021    ALKPHO 55 04/27/2021    BILT 0.8 04/27/2021    TP 6.1 04/27/2021    AST 12 04/27/2021    ALT 18 04/27/2021    MG 2.2 04/27/2021    PHOS 3.0 04/27/2021

## 2021-04-27 NOTE — PHYSICAL THERAPY NOTE
PHYSICAL THERAPY TREATMENT NOTE - INPATIENT     Room Number: 554/554-A       Presenting Problem: Dysphagia    Problem List  Principal Problem:    Dysphagia, unspecified type  Active Problems:    OAB (overactive bladder)    GERD (gastroesophageal reflux dis Home with home health PT;24 hour care/supervision     PLAN  PT Treatment Plan: Bed mobility; Body mechanics; Endurance; Energy conservation;Patient education;Gait training;Strengthening;Transfer training;Balance training;Stair training    SUBJECTIVE  \"My lef of Session: Up in chair;Needs met;Call light within reach;RN aware of session/findings; All patient questions and concerns addressed; Family present    CURRENT GOALS   Goals to be met by: 5/3/2021  Patient Goal Patient's self-stated goal is: restore PLOF   G

## 2021-04-27 NOTE — PLAN OF CARE
Patient had PICC line replaced this afternoon. D5 with potassium will be continued until TPN is restarted tonight. Patient up in chair for most of day. Son at bedside. Patient accuchek every 6 hours. Antibiotics given. No complaints throughout the day.  Con on oxygen saturation or ABGs  - Provide Smoking Cessation handout, if applicable  - Encourage broncho-pulmonary hygiene including cough, deep breathe, Incentive Spirometry  - Assess the need for suctioning and perform as needed  - Assess and instruct to re highest/safest level of mobility/gait  Description: Interventions:  - Assess patient's functional ability and stability  - Promote increasing activity/tolerance for mobility and gait  - Educate and engage patient/family in tolerated activity level and prec

## 2021-04-27 NOTE — PROGRESS NOTES
Pulmonary Progress Note     Assessment / Plan:  1. Aspiration pneumonitis  - unasyn  2. HOLA  - APAP 14-20 previously, has not tolerated it since issues w/ her neck began  - monitor  3.  Esophageal perforation  - per surgical services and GI    Discussed wit

## 2021-04-27 NOTE — PROGRESS NOTES
[facility] HOSP - [facility]    Progress Note    Hussain Dennis Patient Status:  Inpatient    1935 MRN [id number]   Location [facility] 3W/SW Attending Lashon Peace MD   Hosp Day # 4 PCP Jayson Briggs DO     Date of Admission:  2021  SUBJECT by mouth daily.  )  Multiple Vitamins-Minerals (PRESERVISION AREDS) Oral Tab, Take by mouth. Calcium Carb-Cholecalciferol (CALTRATE 600+D3 SOFT OR), Take by mouth. MEGARED OMEGA-3 KRILL OIL OR, Take by mouth.     FOLIC ACID OR, Take 1 tablet by mouth rhythm  Abdominal: soft, non-tender; bowel sounds normal; no masses,  no organomegaly  Extremities: extremities normal, atraumatic, no cyanosis or edema  Skin: Skin color, texture, turgor normal. No rashes or lesions  Neurologic: Grossly normal  Psychiatri 1.00 - 4.00 x10(3) uL  2.07 1.81 1.52   Monocytes Absolute      0.10 - 1.00 x10(3) uL  1.10 (H) 1.08 (H) 0.31   Eosinophils Absolute      0.00 - 0.70 x10(3) uL  0.02 0.00 0.00   Basophils Absolute      0.00 - 0.20 x10(3) uL  0.03 0.02 0.01   Immature Gra 26.0 - 34.0 pg 29.1   MCHC      31.0 - 37.0 g/dL 31.6   RDW-SD      35.1 - 46.3 fL 50.5 (H)   RDW      11.0 - 15.0 % 14.8   Platelet Count      526.8 - 450.0 10(3)uL 278.0   Prelim Neutrophil Abs      1.50 - 7.70 x10 (3) uL    Neutrophils Absolute      1.5 left lung base. Dictated by (CST): Barby Bal MD on 4/25/2021 at 2:39 PM     Finalized by (CST): Barby Bal MD on 4/25/2021 at 2:43 PM          CT CHEST(CONTRAST ONLY) (CPT=71260)    Result Date: 4/25/2021  CONCLUSION:  1.  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

## 2021-04-28 NOTE — PROGRESS NOTES
Pulmonary Progress Note     Assessment / Plan:  1. Aspiration pneumonitis  - continue unasyn today  - abx per ID recommendations  2. HOLA  - APAP 14-20 previously, has not tolerated it since issues w/ her neck began  - monitor  3.  Esophageal perforation  -

## 2021-04-28 NOTE — PROGRESS NOTES
[facility]    Progress Note    Debra Crain Patient Status:  Inpatient    1935 MRN [id number]   Location [facility] 3W/SW Attending Beto Kamara MD   Hosp Day # 5 PCP Maik Rod DO     Date of Admission:  2021  SUBJECT by mouth daily.  )  Multiple Vitamins-Minerals (PRESERVISION AREDS) Oral Tab, Take by mouth. Calcium Carb-Cholecalciferol (CALTRATE 600+D3 SOFT OR), Take by mouth. MEGARED OMEGA-3 KRILL OIL OR, Take by mouth.     FOLIC ACID OR, Take 1 tablet by mouth ALKPHO 55 04/27/2021    TP 6.1 (L) 04/27/2021    AST 12 (L) 04/27/2021    ALT 18 04/27/2021    PTT 29.7 03/16/2021    INR 1.11 03/16/2021    PTP 14.1 03/16/2021    TSH 3.080 03/16/2021    ESRML 12 04/24/2021    CRP 1.37 (H) 04/19/2021    MG 1.9 04/28/2021    Discussed with patient and family member in room.     Will sign off. Please call if any questions/concerns. Thank you for allowing me to participate in the care of your patient.     Jesus Lundy MD  [facility]  [phone]  4/28/2021  2:53 PM

## 2021-04-28 NOTE — PROGRESS NOTES
[facility]    Progress Note    Nidia Balbuena Patient Status:  Inpatient    1940 MRN [id number]   Location [facility] 5SW/SE Attending Radha Mahmood MD   Hosp Day # 5 PCP Kt Waller DO       Subjective:   Nidia Balbuena i Dysphagia, unspecified type        Perforation of esophagus  NPO, TPN. Continue current regimen.        GERD (gastroesophageal reflux disease)        Spinal stenosis        Essential hypertension        Obstructive sleep apnea        Major depressive disor

## 2021-04-28 NOTE — PROGRESS NOTES
INFECTIOUS DISEASE PROGRESS NOTE  [facility] OF BOB ID PROGRESS NOTE    Radha Todd Patient Status:  Inpatient    1940 MRN [id number]   Location [facility] 5SW/SE Attending Сергей Warren MD   Hosp Day # 5 PCP Sofiya Black disorder, recurrent episode, in full remission ([facility] [address]     Bilateral carotid artery disease, unspecified type ([address]     Cervical myelopathy ([address]     Dysphagia     Dysphagia, unspecified type     Aspiration into airway, initial encounter     [facility]  [phone]  4/26/2021

## 2021-04-28 NOTE — PROGRESS NOTES
THORACIC SURGERY PROGRESS NOTE    Subjective: doing well while NPO. No new chest or neck pain    Objective:     04/28/21  0833   BP: 142/53   Pulse: 89   Resp: 18   Temp: 98.1 °F (36.7 °C)     I/O last 3 completed shifts:   In: 3079.8 [P.O.:50; I.V.:1966;

## 2021-04-28 NOTE — PLAN OF CARE
Problem: Patient Centered Care  Goal: Patient preferences are identified and integrated in the patient's plan of care  Description: Interventions:  - What would you like us to know as we care for you?  I live with my son and I had recent spine surgery her support as indicated  - Manage/alleviate anxiety  - Monitor for signs/symptoms of CO2 retention  Outcome: Progressing     Problem: METABOLIC/FLUID AND ELECTROLYTES - ADULT  Goal: Glucose maintained within prescribed range  Description: INTERVENTIONS:  - Mo assistance with activity based on assessment  - Modify environment to reduce risk of injury  - Provide assistive devices as appropriate  - Consider OT/PT consult to assist with strengthening/mobility  - Encourage toileting schedule  Outcome: Progressing

## 2021-04-28 NOTE — PROGRESS NOTES
[facility] Pharmacy Note:  Renal Adjustment for ampicillin/sulbactam (UNASYN)    Gisell Patel is a 78year old patient who has been prescribed ampicillin/sulbactam (UNASYN) 3 gm every 12 hrs.   The estimated creatinine clearance is 63.5 mL/min (based on SCr of 0

## 2021-04-29 PROCEDURE — 31575 DIAGNOSTIC LARYNGOSCOPY: CPT | Performed by: OTOLARYNGOLOGY

## 2021-04-29 PROCEDURE — 99221 1ST HOSP IP/OBS SF/LOW 40: CPT | Performed by: OTOLARYNGOLOGY

## 2021-04-29 RX ORDER — LORAZEPAM 2 MG/ML
0.5 INJECTION INTRAMUSCULAR NIGHTLY
Status: DISCONTINUED | OUTPATIENT
Start: 2021-04-29 | End: 2021-05-04

## 2021-04-29 NOTE — PHYSICAL THERAPY NOTE
PHYSICAL THERAPY TREATMENT NOTE - INPATIENT     Room Number: 554/554-A       Presenting Problem: Dysphagia    Problem List  Principal Problem:    Dysphagia, unspecified type  Active Problems:    GERD (gastroesophageal reflux disease)    Hypothyroidism    D aware.    The patient's Approx Degree of Impairment: 41.77% has been calculated based on documentation in the [facility] '6 clicks' Inpatient Basic Mobility Short Form.   Research supports that patients with this level of impairment may benefit from home with [facility] steps with a railing?: A Little     AM-PAC Score:  Raw Score: 19   Approx Degree of Impairment: 41.77%   Standardized Score (AM-PAC Scale): 45.44   CMS Modifier (G-Code): CK    FUNCTIONAL ABILITY STATUS  Gait Assessment   Gait Assistance: Supervision  Dist

## 2021-04-29 NOTE — DIETARY NOTE
ADULT NUTRITION REASSESSMENT    Pt is at high nutrition risk. Pt meets severe malnutrition criteria.       CRITERIA FOR MALNUTRITION DIAGNOSIS:  Criteria for severe malnutrition diagnosis: chronic illness related to wt loss greater than 10% in 6 months and dysphagia since October. Eating 2 small meals/day. Eventually only able to eat by tsp and pt said this is exhausting and difficult to meet needs this way. Noted 44# (20%) wt loss in 6 months.  Discussed recommendation for PEG with family, answered questions LORazepam  0.5 mg Intravenous Nightly   • ampicillin-sulbactam  3 g Intravenous Q8H   • [START ON 5/2/2021] levothyroxine sodium  12.6 mcg Intravenous Daily   • famoTIDine  20 mg Intravenous BID     LABS: reviewed;   Recent Labs     04/27/21  0631 04/28/21 month.  NUTRITION DIAGNOSIS PROGRESS:  No Improvement (continue)    ESTIMATED NUTRITION NEEDS: Dosing wt: 54.5 kg  Calories: 6951-1725 calories/day (30-32 calories per kg Ideal body wt (IBW))  Protein: 70-82 grams protein/day (1.3-1.5 grams protein per kg normal...

## 2021-04-29 NOTE — PLAN OF CARE
Problem: Patient Centered Care  Goal: Patient preferences are identified and integrated in the patient's plan of care  Description: Interventions:  - What would you like us to know as we care for you?  I live with my son and I had recent spine surgery her support as indicated  - Manage/alleviate anxiety  - Monitor for signs/symptoms of CO2 retention  Outcome: Progressing     Problem: METABOLIC/FLUID AND ELECTROLYTES - ADULT  Goal: Glucose maintained within prescribed range  Description: INTERVENTIONS:  - Mo on venipuncture sites to achieve adequate hemostasis  - Assess for signs and symptoms of internal bleeding  - Monitor lab trends  - Patient is to report abnormal signs of bleeding to staff  - Avoid use of toothpicks and dental floss  - Use electric shaver

## 2021-04-29 NOTE — PROGRESS NOTES
[facility]    Ortho Medical Progress Note     Esa Nguyen Patient Status:  Inpatient    1940 MRN [id number]   Location [facility] 5SW/SE Attending Gamal Lutz MD   Hosp Day # 6 PCP Malcolm Ribera DO       Subjective:   A Cervical myelopathy ([facility] [address]  Post failed revision      Aspiration into airway, initial encounter        Perforation of esophagus  Allowing esophagus to rest with strict NPO    Insomnia - Ativan IV    Results:     Lab Results   Component Value Date    WBC 8.1

## 2021-04-29 NOTE — CM/SW NOTE
SW following for discharge planning. Patient was discussed during rounds and it is anticipated that patient will require TPN upon discharge. SW initiated tentative TPN referral via Aidin.     Plan: Home with son, [facility], [facility], TPN pending clinical course

## 2021-04-29 NOTE — CONSULTS
[facility]    PATIENT'S NAME: Jossie Reid   ATTENDING PHYSICIAN: Dorian Escalera MD   CONSULTING PHYSICIAN: Jeff Ward.  Nalini Dee MD   PATIENT ACCOUNT#:   [id number]    LOCATION:  [address] [place] [address] #:   [id number]       DATE OF BIRTH:  [birth year] does have some significant fullness along the posterior pharyngeal wall. I cannot identify a distinct spot of perforation.   If the patient needs to have some sort of a surgical revision of her cervical spine performed or a surgical procedure because of pe

## 2021-04-30 NOTE — PLAN OF CARE
Problem: Patient Centered Care  Goal: Patient preferences are identified and integrated in the patient's plan of care  Description: Interventions:  - What would you like us to know as we care for you?  I live with my son and I had recent spine surgery her support as indicated  - Manage/alleviate anxiety  - Monitor for signs/symptoms of CO2 retention  Outcome: Progressing     Problem: METABOLIC/FLUID AND ELECTROLYTES - ADULT  Goal: Glucose maintained within prescribed range  Description: INTERVENTIONS:  - Mo

## 2021-04-30 NOTE — PROGRESS NOTES
[facility]    Progress Note    Gil Thao Patient Status:  Inpatient    1940 MRN [id number]   Location [facility] 5SW/SE Attending Jeni Hernandez MD   Hosp Day # 7 PCP Dilia Clemens DO       Subjective:   Gil Thao i CREATSERUM 0.64 04/30/2021    BUN 17 04/30/2021     04/30/2021    K 4.6 04/30/2021     04/30/2021    CO2 31.0 04/30/2021    GLU 98 04/30/2021    CA 9.3 04/30/2021    ALB 3.0 (L) 04/27/2021    ALKPHO 55 04/27/2021    BILT 0.8 04/27/2021    TP 6.

## 2021-04-30 NOTE — PLAN OF CARE
Problem: Patient Centered Care  Goal: Patient preferences are identified and integrated in the patient's plan of care  Description: Interventions:  - What would you like us to know as we care for you?  I live with my son and I had recent spine surgery her support as indicated  - Manage/alleviate anxiety  - Monitor for signs/symptoms of CO2 retention  Outcome: Progressing     Problem: METABOLIC/FLUID AND ELECTROLYTES - ADULT  Goal: Glucose maintained within prescribed range  Description: INTERVENTIONS:  - Mo Free from fall injury  Description: INTERVENTIONS:  - Assess pt frequently for physical needs  - Identify cognitive and physical deficits and behaviors that affect risk of falls.   - Jenks fall precautions as indicated by assessment.  - Educate pt/famil

## 2021-04-30 NOTE — CM/SW NOTE
SANTINO rec'd update from Korina aguilera home TPN:    Per our Medicare Reviewer: Pt with esophageal perforation and infected cervical hardware. GI note states family can consider PEG tube via IR.  If enteral is an option the patient will not qualify for home TPN under

## 2021-04-30 NOTE — PHYSICAL THERAPY NOTE
PHYSICAL THERAPY TREATMENT NOTE - INPATIENT     Room Number: 554/554-A       Presenting Problem: Dysphagia    Problem List  Principal Problem:    Dysphagia, unspecified type  Active Problems:    GERD (gastroesophageal reflux disease)    Hypothyroidism    D PLAN  PT Treatment Plan: Bed mobility; Energy conservation; Endurance; Patient education;Gait training;Strengthening;Transfer training;Stair training    SUBJECTIVE  \"I already went on two walks today. \"    OBJECTIVE  Precautions: Aspiration; Other (Commen End of Session: Up in chair;Needs met;Call light within reach;RN aware of session/findings; All patient questions and concerns addressed; Family present    CURRENT GOALS   Goals to be met by: 5/3/2021;  ALL GOALS ACHIEVED TO BASELINE, SON TO ASSIST AT HOME  P

## 2021-05-01 RX ORDER — CLOTRIMAZOLE 1 %
CREAM (GRAM) TOPICAL 2 TIMES DAILY PRN
Status: DISCONTINUED | OUTPATIENT
Start: 2021-05-01 | End: 2021-05-04

## 2021-05-01 RX ORDER — FLUTICASONE PROPIONATE 50 MCG
1 SPRAY, SUSPENSION (ML) NASAL DAILY PRN
Status: DISCONTINUED | OUTPATIENT
Start: 2021-05-01 | End: 2021-05-04

## 2021-05-01 NOTE — PROGRESS NOTES
[facility] HOSP - [facility]    Progress Note    Evens Gaviria Patient Status:  Inpatient    1940 MRN [id number]   Location [facility] 5SW/SE Attending Nathaniel Wright MD   Hosp Day # 8 PCP Ej Augustine DO       Subjective:   Evens Gaviria i  (H) 05/01/2021    CA 8.7 05/01/2021    ALB 3.0 (L) 04/27/2021    ALKPHO 55 04/27/2021    BILT 0.8 04/27/2021    TP 6.1 (L) 04/27/2021    AST 12 (L) 04/27/2021    ALT 18 04/27/2021    PTT 29.7 03/16/2021    INR 1.11 03/16/2021    TSH 3.080 03/16/

## 2021-05-01 NOTE — CM/SW NOTE
CM contacted weekend supervisor for [facility] and was informed authorization for TPN at home is still pending ,bensyds verification. SW/CM to remain available for support and/or discharge planning.          Kirsten Del Rosario RN Case Manager   Ext. [phone]

## 2021-05-01 NOTE — OCCUPATIONAL THERAPY NOTE
OCCUPATIONAL THERAPY TREATMENT NOTE - INPATIENT        Room Number: 554/554-A           Presenting Problem: aspiration pna    Problem List  Principal Problem:    Dysphagia, unspecified type  Active Problems:    GERD (gastroesophageal reflux disease)    Hyp simplification techniques; Endurance training    SUBJECTIVE  Pt seen up in chair and agreeable for OT session.      OBJECTIVE  Precautions: Aspiration    WEIGHT BEARING RESTRICTION  Weight Bearing Restriction: None                PAIN ASSESSMENT  Ratin I  Comment: CGA    Patient will tolerate standing for 5 minutes in prep for adls with Mod I   Comment:supervision     Patient will complete lower body dressing routine with Mod I incorporating energy conservation techniques prn  Comment:supervision with pt

## 2021-05-02 NOTE — PROGRESS NOTES
[facility]    Progress Note    Gisell Patel Patient Status:  Inpatient    1940 MRN [id number]   Location [facility] 5SW/SE Attending Jose Hutchinson MD   Hosp Day # 9 PCP Leslie Montero,        Subjective:   Gisell Patel i 05/02/2021    K 4.4 05/02/2021     05/02/2021    CO2 25.0 05/02/2021    GLU 99 05/02/2021    CA 9.2 05/02/2021    ALB 3.0 (L) 04/27/2021    ALKPHO 55 04/27/2021    BILT 0.8 04/27/2021    TP 6.1 (L) 04/27/2021    AST 12 (L) 04/27/2021    ALT 18 04/27/

## 2021-05-02 NOTE — SLP NOTE
SLP following POC peripherally. Pt remains strict NPO with TPN infusing. No plan for surgical intervention at this time. Plan is for pt to go home with TPN. SLP to sign off at this time.  Please contact SLP with any questions, concerns, and/or change in sta

## 2021-05-02 NOTE — PLAN OF CARE
VSS,continuing TPN and antibiotics,up with walker with 1 assist,denies any pain. Social work on the case to set up P21 50 at home. Fall precautions in place. Will continue to monitor patient. No change from previous assessment.  Ambulating in sheth with w Provide Smoking Cessation handout, if applicable  - Encourage broncho-pulmonary hygiene including cough, deep breathe, Incentive Spirometry  - Assess the need for suctioning and perform as needed  - Assess and instruct to report SOB or any respiratory diff mobility/gait  Description: Interventions:  - Assess patient's functional ability and stability  - Promote increasing activity/tolerance for mobility and gait  - Educate and engage patient/family in tolerated activity level and precautions  Outcome: Fadia management of diabetes  Outcome: Progressing

## 2021-05-03 RX ORDER — CLOTRIMAZOLE 1 %
1 CREAM (GRAM) TOPICAL 2 TIMES DAILY PRN
Qty: 1 TUBE | Refills: 0 | Status: SHIPPED | OUTPATIENT
Start: 2021-05-03

## 2021-05-03 RX ORDER — LORAZEPAM 2 MG/ML
0.5 INJECTION INTRAMUSCULAR NIGHTLY
Qty: 15 VIAL | Refills: 0 | Status: SHIPPED | COMMUNITY
Start: 2021-05-03 | End: 2021-05-04

## 2021-05-03 RX ORDER — FLUTICASONE PROPIONATE 50 MCG
1 SPRAY, SUSPENSION (ML) NASAL DAILY PRN
Qty: 1 BOTTLE | Refills: 0 | Status: SHIPPED | OUTPATIENT
Start: 2021-05-03 | End: 2021-12-02

## 2021-05-03 RX ORDER — FAMOTIDINE 10 MG/ML
20 INJECTION, SOLUTION INTRAVENOUS 2 TIMES DAILY
Refills: 0 | Status: ON HOLD | COMMUNITY
Start: 2021-05-03 | End: 2021-06-11

## 2021-05-03 NOTE — PLAN OF CARE
Problem: Patient Centered Care  Goal: Patient preferences are identified and integrated in the patient's plan of care  Description: Interventions:  - What would you like us to know as we care for you?  I live with my son and I had recent spine surgery her electrolyte imbalances  - Administer electrolyte replacement as ordered  - Monitor response to electrolyte replacements, including rhythm and repeat lab results as appropriate  - Fluid restriction as ordered  - Instruct patient on fluid and nutrition restr

## 2021-05-03 NOTE — PROGRESS NOTES
[facility]    Ortho Medical Progress Note     Tracy Grajeda Patient Status:  Inpatient    1940 MRN [id number]   Location [facility] 5SW/SE Attending Aileen Abarca MD   Hosp Day # 10 PCP Jeanette Leyden, DO       Subjective: Essential hypertension  Stable without meds      Osteopenia        Obstructive sleep apnea        Major depressive disorder, recurrent episode, in full remission ([facility] [address]        Cervical myelopathy (HCC)        Aspiration into airway, initial encounter team for any medical questions after discharged. Susannah Schmitz 3, APN  Office [phone]  Cell [phone]  5/3/2021

## 2021-05-03 NOTE — PROGRESS NOTES
INFECTIOUS DISEASE PROGRESS NOTE  [place] [facility] - [facility] ID PROGRESS NOTE    Bruce Jordan Patient Status:  Inpatient    1940 MRN [id number]   Location [facility] 5SW/SE Attending Yves García, [address] [facility]  Day # 10 PCP Morgan Barrera artery disease, unspecified type ([facility] [address]     Cervical myelopathy ([facility] [address]     Dysphagia     Dysphagia, unspecified type     Aspiration into airway, initial encounter     Perforation of esophagus      ASSESSMENT:    Antibiotics: Unasyn  (invanz PTA, meropenem)

## 2021-05-03 NOTE — CM/SW NOTE
F/u op home TPN. Per RN in rounds pt is likely medically ready for dc, MD still rec home TPN.     Per Rosas Santana at [facility] they are unable to verify pt will qualify for home TPN due to GI note that indicates pt given the option of PEG placement via IR, however

## 2021-05-03 NOTE — DIETARY NOTE
ADULT NUTRITION REASSESSMENT    Pt is at high nutrition risk. Pt meets severe malnutrition criteria.       CRITERIA FOR MALNUTRITION DIAGNOSIS:Criteria for severe malnutrition diagnosis: chronic illness related to wt loss greater than 10% in 6 months and e of esophageal perforation with surrounding gas and abscess formulation; per GI, no PEG and strict NPO with TPN moving forward. Update 04/29/21:  Pt doing well and tolerating TPN. Pt reports some diarrhea, likely due to antibiotics.  Per d/w son and pt, d lb)  02/25/21 : 83.9 kg (185 lb)  12/04/20 : 94.8 kg (209 lb)  08/20/20 : 99.3 kg (219 lb)  07/24/20 : 97.5 kg (215 lb)  06/24/20 : 95.3 kg (210 lb)  06/01/20 : 98 kg (216 lb)  02/20/20 : 97.5 kg (215 lb)    NUTRITION DIAGNOSIS/PROBLEM:  Malnutrition relat transfer of nutrition care to new setting or provider: to be determined    MONITOR AND EVALUATE/NUTRITION GOALS:  - Food and Nutrient Intake:    Monitor: for PO initiation in the future  - Food and Nutrient Administration:    Monitor: TPN initiation, TPN t

## 2021-05-04 VITALS
TEMPERATURE: 97 F | WEIGHT: 168.5 LBS | BODY MASS INDEX: 28.77 KG/M2 | RESPIRATION RATE: 22 BRPM | SYSTOLIC BLOOD PRESSURE: 138 MMHG | HEIGHT: 64 IN | HEART RATE: 94 BPM | DIASTOLIC BLOOD PRESSURE: 63 MMHG | OXYGEN SATURATION: 100 %

## 2021-05-04 RX ORDER — LORAZEPAM 2 MG/ML
0.5 INJECTION INTRAMUSCULAR NIGHTLY
Qty: 15 VIAL | Refills: 0 | Status: ON HOLD | OUTPATIENT
Start: 2021-05-04 | End: 2021-06-11

## 2021-05-04 RX ORDER — FAMOTIDINE 10 MG/ML
20 INJECTION, SOLUTION INTRAVENOUS DAILY
Status: DISCONTINUED | OUTPATIENT
Start: 2021-05-05 | End: 2021-05-04

## 2021-05-04 NOTE — RESTORATIVE THERAPY
RESTORATIVE CARE TREATMENT NOTE    Presenting Problem  Presenting Problem: Dysphagia  Presenting Problem: aspiration pna  Presenting Problem: Swallow    Precautions  Precautions: Aspiration  Precautions: Aspiration    Weight Bearing Restriction  Weight Marcella

## 2021-05-04 NOTE — PROGRESS NOTES
[facility] Pharmacy Note:  Renal Dose Adjustment    Debra Crain has been prescribed famotidine (PEPCID) 20 mg intravenously every 12 hours. Estimated Creatinine Clearance: 43 mL/min (based on SCr rounded up to 0.85 mg/dL).     Calculated creatinine clearance

## 2021-05-04 NOTE — PROGRESS NOTES
[facility]    Progress Note    Evaristo Fam Patient Status:  Inpatient    1940 MRN [id number]   Location [facility] 5SW/SE Attending Jose Gutierrez MD   [address] Day # 6 PCP Kristopher Otoole DO       Subjective:   Evaristo Fam  (H) 05/03/2021    CA 8.7 05/03/2021    ALB 3.0 (L) 04/27/2021    ALKPHO 55 04/27/2021    BILT 0.8 04/27/2021    TP 6.1 (L) 04/27/2021    AST 12 (L) 04/27/2021    ALT 18 04/27/2021    PTT 29.7 03/16/2021    INR 1.11 03/16/2021    TSH 3.080 03/16/202

## 2021-05-04 NOTE — PLAN OF CARE
VSS. Denies pain. Ambulated in halls w/ PT this afternoon. Up to chair most of day. PICC dressing changed. Pt to discharge home w/ TPN, IV abx, and home health services. Discharge and follow-up reviewed with patient and son - all questions answered. Provide Smoking Cessation handout, if applicable  - Encourage broncho-pulmonary hygiene including cough, deep breathe, Incentive Spirometry  - Assess the need for suctioning and perform as needed  - Assess and instruct to report SOB or any respiratory diff fall injury  Description: INTERVENTIONS:  - Assess pt frequently for physical needs  - Identify cognitive and physical deficits and behaviors that affect risk of falls.   - [place] fall precautions as indicated by assessment.  - Educate pt/family on patie Assess barriers to adequate nutritional intake and initiate nutrition consult as needed  - Instruct patient on self management of diabetes  Outcome: Adequate for Discharge

## 2021-05-04 NOTE — PLAN OF CARE
VSS. Denies pain. TPN infusing. Continues on IV abx. Up to chair most of day. Plan discharge home tomorrow. Call light within reach - able to make needs known.       Problem: Patient Centered Care  Goal: Patient preferences are identified and integrated in Incentive Spirometry  - Assess the need for suctioning and perform as needed  - Assess and instruct to report SOB or any respiratory difficulty  - Respiratory Therapy support as indicated  - Manage/alleviate anxiety  - Monitor for signs/symptoms of CO2 ret falls.  - [place] fall precautions as indicated by assessment.  - Educate pt/family on patient safety including physical limitations  - Instruct pt to call for assistance with activity based on assessment  - Modify environment to reduce risk of injury  -

## 2021-05-07 ENCOUNTER — LAB REQUISITION (OUTPATIENT)
Dept: LAB | Facility: HOSPITAL | Age: 81
End: 2021-05-07
Payer: MEDICARE

## 2021-05-07 DIAGNOSIS — R63.4 ABNORMAL WEIGHT LOSS: ICD-10-CM

## 2021-05-07 DIAGNOSIS — K22.3 PERFORATION OF ESOPHAGUS: ICD-10-CM

## 2021-05-07 DIAGNOSIS — M46.22 OSTEOMYELITIS OF VERTEBRA, CERVICAL REGION (HCC): ICD-10-CM

## 2021-05-07 DIAGNOSIS — B95.4 OTHER STREPTOCOCCUS AS THE CAUSE OF DISEASES CLASSIFIED ELSEWHERE: ICD-10-CM

## 2021-05-07 PROCEDURE — 80053 COMPREHEN METABOLIC PANEL: CPT | Performed by: INTERNAL MEDICINE

## 2021-05-07 PROCEDURE — 83735 ASSAY OF MAGNESIUM: CPT | Performed by: INTERNAL MEDICINE

## 2021-05-07 PROCEDURE — 84100 ASSAY OF PHOSPHORUS: CPT | Performed by: INTERNAL MEDICINE

## 2021-05-07 PROCEDURE — 85652 RBC SED RATE AUTOMATED: CPT | Performed by: INTERNAL MEDICINE

## 2021-05-07 PROCEDURE — 86140 C-REACTIVE PROTEIN: CPT | Performed by: INTERNAL MEDICINE

## 2021-05-07 PROCEDURE — 80061 LIPID PANEL: CPT | Performed by: INTERNAL MEDICINE

## 2021-05-07 PROCEDURE — 85025 COMPLETE CBC W/AUTO DIFF WBC: CPT | Performed by: INTERNAL MEDICINE

## 2021-05-10 NOTE — DISCHARGE SUMMARY
[place] [facility] - [facility]    Discharge Summary    Robyn Doe Patient Status:  Inpatient    1940 MRN [id number]   Location [facility] 5SW/SE Attending No att. providers found   Hosp Day # 11 PCP Jose Roberto Larsen DO     Date of Admission:  antibiotics    Complications: none    Consultants  Chat With All Active Members    Provider Role Specialty    Aclides Thomas MD  Consulting Physician  OTOLARYNGOLOGY    Josse Tirado, Eva Luis MD  Consulting Physician  SURGERY, GENERAL    Sara Ruiz, DISEASES  Contact information:  [address] [address] Kierra Jamison [address] [facility]  [phone]                   Follow up Labs: weekly         Tarry Leyden. Born  5/10/2021

## 2021-05-14 ENCOUNTER — LAB REQUISITION (OUTPATIENT)
Dept: LAB | Facility: HOSPITAL | Age: 81
End: 2021-05-14
Payer: MEDICARE

## 2021-05-14 DIAGNOSIS — R63.4 ABNORMAL WEIGHT LOSS: ICD-10-CM

## 2021-05-14 DIAGNOSIS — K22.3 PERFORATION OF ESOPHAGUS: ICD-10-CM

## 2021-05-14 DIAGNOSIS — M46.22 OSTEOMYELITIS OF VERTEBRA, CERVICAL REGION (HCC): ICD-10-CM

## 2021-05-14 DIAGNOSIS — B95.4 OTHER STREPTOCOCCUS AS THE CAUSE OF DISEASES CLASSIFIED ELSEWHERE: ICD-10-CM

## 2021-05-14 DIAGNOSIS — B96.89 OTHER SPECIFIED BACTERIAL AGENTS AS THE CAUSE OF DISEASES CLASSIFIED ELSEWHERE: ICD-10-CM

## 2021-05-14 PROCEDURE — 86140 C-REACTIVE PROTEIN: CPT | Performed by: INTERNAL MEDICINE

## 2021-05-14 PROCEDURE — 85025 COMPLETE CBC W/AUTO DIFF WBC: CPT | Performed by: INTERNAL MEDICINE

## 2021-05-14 PROCEDURE — 84100 ASSAY OF PHOSPHORUS: CPT | Performed by: INTERNAL MEDICINE

## 2021-05-14 PROCEDURE — 80061 LIPID PANEL: CPT | Performed by: INTERNAL MEDICINE

## 2021-05-14 PROCEDURE — 85652 RBC SED RATE AUTOMATED: CPT | Performed by: INTERNAL MEDICINE

## 2021-05-14 PROCEDURE — 83735 ASSAY OF MAGNESIUM: CPT | Performed by: INTERNAL MEDICINE

## 2021-05-14 PROCEDURE — 80053 COMPREHEN METABOLIC PANEL: CPT | Performed by: INTERNAL MEDICINE

## 2021-05-21 ENCOUNTER — HOSPITAL ENCOUNTER (OUTPATIENT)
Dept: CT IMAGING | Age: 81
Discharge: HOME | End: 2021-05-21
Attending: INTERNAL MEDICINE
Payer: MEDICARE

## 2021-05-21 ENCOUNTER — LAB REQUISITION (OUTPATIENT)
Dept: LAB | Facility: HOSPITAL | Age: 81
End: 2021-05-21
Payer: MEDICARE

## 2021-05-21 DIAGNOSIS — K22.3 ESOPHAGEAL PERFORATION: ICD-10-CM

## 2021-05-21 DIAGNOSIS — B96.89 OTHER SPECIFIED BACTERIAL AGENTS AS THE CAUSE OF DISEASES CLASSIFIED ELSEWHERE: ICD-10-CM

## 2021-05-21 DIAGNOSIS — M46.22 OSTEOMYELITIS OF VERTEBRA, CERVICAL REGION (HCC): ICD-10-CM

## 2021-05-21 DIAGNOSIS — B95.4 OTHER STREPTOCOCCUS AS THE CAUSE OF DISEASES CLASSIFIED ELSEWHERE: ICD-10-CM

## 2021-05-21 PROCEDURE — 83735 ASSAY OF MAGNESIUM: CPT | Performed by: INTERNAL MEDICINE

## 2021-05-21 PROCEDURE — 84100 ASSAY OF PHOSPHORUS: CPT | Performed by: INTERNAL MEDICINE

## 2021-05-21 PROCEDURE — 71260 CT THORAX DX C+: CPT | Performed by: INTERNAL MEDICINE

## 2021-05-21 PROCEDURE — 80061 LIPID PANEL: CPT | Performed by: INTERNAL MEDICINE

## 2021-05-21 PROCEDURE — 80053 COMPREHEN METABOLIC PANEL: CPT | Performed by: INTERNAL MEDICINE

## 2021-05-21 PROCEDURE — 85652 RBC SED RATE AUTOMATED: CPT | Performed by: INTERNAL MEDICINE

## 2021-05-21 PROCEDURE — 85025 COMPLETE CBC W/AUTO DIFF WBC: CPT | Performed by: INTERNAL MEDICINE

## 2021-05-21 PROCEDURE — 86140 C-REACTIVE PROTEIN: CPT | Performed by: INTERNAL MEDICINE

## 2021-05-26 ENCOUNTER — OFFICE VISIT (OUTPATIENT)
Dept: SPEECH THERAPY | Facility: HOSPITAL | Age: 81
End: 2021-05-26
Attending: INTERNAL MEDICINE
Payer: MEDICARE

## 2021-05-26 DIAGNOSIS — R13.19 ESOPHAGEAL DYSPHAGIA: ICD-10-CM

## 2021-05-26 PROCEDURE — 92610 EVALUATE SWALLOWING FUNCTION: CPT

## 2021-05-26 NOTE — PROGRESS NOTES
ADULT SWALLOWING EVALUATION:   Referring Physician: Dr. Soraida Tan  Diagnosis: dysphagia   Date of Service: 8/27/2020     PATIENT SUMMARY   Nupur Cheung is a 78year old y/o female who has had a complicated course of dysphagia which began after cervical diet.  The above history was provided by chart review and also by son who accompanied Alejandro Schirmer to the evaluation. Problem List  Active Problems:  Active Problems:    * No active hospital problems.  *      Past Medical History  Past Medical History: WNL  Facial and Oral Structure/Appearance: WNL  Symmetry: WNL  Strength: WNL  Tone: WNL  Range of Motion: WNL  Rate of Motion: WNL    Vocal Quality: WNL  Respiration: WNL  Consistencies Trialed:  Thin liquid, Nectar liquid, Honey liquid, Puree and Hard aidee Sincerely,  Electronically signed by therapist: Gil Spann, SLP  Rosie Hong MA/[facility]-SLP  Speech Language Pathologist  [facility]  [phone]

## 2021-05-27 ENCOUNTER — LAB REQUISITION (OUTPATIENT)
Dept: LAB | Facility: HOSPITAL | Age: 81
End: 2021-05-27
Payer: MEDICARE

## 2021-05-27 ENCOUNTER — HOSPITAL ENCOUNTER (OUTPATIENT)
Dept: GENERAL RADIOLOGY | Facility: HOSPITAL | Age: 81
Discharge: HOME | End: 2021-05-27
Attending: OTOLARYNGOLOGY
Payer: MEDICARE

## 2021-05-27 DIAGNOSIS — M46.22 OSTEOMYELITIS OF VERTEBRA, CERVICAL REGION (HCC): ICD-10-CM

## 2021-05-27 DIAGNOSIS — B95.4 OTHER STREPTOCOCCUS AS THE CAUSE OF DISEASES CLASSIFIED ELSEWHERE: ICD-10-CM

## 2021-05-27 DIAGNOSIS — B96.89 OTHER SPECIFIED BACTERIAL AGENTS AS THE CAUSE OF DISEASES CLASSIFIED ELSEWHERE: ICD-10-CM

## 2021-05-27 DIAGNOSIS — R13.14 PHARYNGOESOPHAGEAL DYSPHAGIA: ICD-10-CM

## 2021-05-27 PROCEDURE — 86140 C-REACTIVE PROTEIN: CPT | Performed by: INTERNAL MEDICINE

## 2021-05-27 PROCEDURE — 83735 ASSAY OF MAGNESIUM: CPT | Performed by: INTERNAL MEDICINE

## 2021-05-27 PROCEDURE — 80053 COMPREHEN METABOLIC PANEL: CPT | Performed by: INTERNAL MEDICINE

## 2021-05-27 PROCEDURE — 84100 ASSAY OF PHOSPHORUS: CPT | Performed by: INTERNAL MEDICINE

## 2021-05-27 PROCEDURE — 74230 X-RAY XM SWLNG FUNCJ C+: CPT | Performed by: OTOLARYNGOLOGY

## 2021-05-27 PROCEDURE — 80061 LIPID PANEL: CPT | Performed by: INTERNAL MEDICINE

## 2021-05-27 PROCEDURE — 92611 MOTION FLUOROSCOPY/SWALLOW: CPT

## 2021-05-27 PROCEDURE — 85652 RBC SED RATE AUTOMATED: CPT | Performed by: INTERNAL MEDICINE

## 2021-05-27 PROCEDURE — 85025 COMPLETE CBC W/AUTO DIFF WBC: CPT | Performed by: INTERNAL MEDICINE

## 2021-05-27 NOTE — PROGRESS NOTES
Having some aspiration with thin liquids.  Follow up with speech therapy for strategies and diet recommendations

## 2021-05-27 NOTE — PROGRESS NOTES
ADULT VIDEOFLUOROSCOPIC SWALLOWING STUDY       ADULT VIDEOFLUOROSCOPIC SWALLOWING STUDY:   Referring Physician: Manisha No      Radiologist: Dr. Sandra Hernández  Diagnosis: dysphagia    Date of Service: 5/27/2021     PATIENT SUMMARY   Chief Complaint: Malen Todd felt they may disrupt the healing process. The patient was seen yesterday, 5/26/21, for a clinical swallow evaluation during which aspiration could not be ruled out. She is here today to assess candidacy for PO diet and rule out aspiration.         Leonides Cole consolidation or pleural effusion is noted to suggest aspiration pneumonia.       3.  Unchanged bilateral adrenal gland nodules.  Right nodule remains indeterminate.  Consider follow-up with elective adrenal protocol MRI.       4. Lesser incidental findings Penetration Aspiration Scale: 8/8. Material entered the airway, passed below the vocal cords and no attempt was made to eject.       Overall Impression: Mild-moderate pharyngeal dysphagia was characterized by apparent swelling around the cervical hardw sips  Small bites  Eat slowly  Swallow twice with each bite  Chin tuck with nectar thick liquids. • Patient will reduce risk of aspiration by completing the following dysphagia exercises to 90% accuracy 20 repetitions 2-3x/day.    Laryngeal adduction  Candice

## 2021-05-27 NOTE — PATIENT INSTRUCTIONS
Diet Recommendations:  Solids: soft moist foods  Liquids: Nectar    Recommended compensatory strategies:   Sit upright  Small sips  Small bites  Eat slowly  Swallow twice with each bite  Chin tuck with nectar thick liquids.     Medication Administration:

## 2021-06-01 ENCOUNTER — LAB REQUISITION (OUTPATIENT)
Dept: LAB | Facility: HOSPITAL | Age: 81
End: 2021-06-01
Payer: MEDICARE

## 2021-06-01 DIAGNOSIS — M46.22 OSTEOMYELITIS OF VERTEBRA, CERVICAL REGION (HCC): ICD-10-CM

## 2021-06-01 DIAGNOSIS — B95.4 OTHER STREPTOCOCCUS AS THE CAUSE OF DISEASES CLASSIFIED ELSEWHERE: ICD-10-CM

## 2021-06-01 DIAGNOSIS — B96.89 OTHER SPECIFIED BACTERIAL AGENTS AS THE CAUSE OF DISEASES CLASSIFIED ELSEWHERE: ICD-10-CM

## 2021-06-01 PROCEDURE — 83735 ASSAY OF MAGNESIUM: CPT | Performed by: INTERNAL MEDICINE

## 2021-06-01 PROCEDURE — 80061 LIPID PANEL: CPT | Performed by: INTERNAL MEDICINE

## 2021-06-01 PROCEDURE — 80053 COMPREHEN METABOLIC PANEL: CPT | Performed by: INTERNAL MEDICINE

## 2021-06-01 PROCEDURE — 85025 COMPLETE CBC W/AUTO DIFF WBC: CPT | Performed by: INTERNAL MEDICINE

## 2021-06-01 PROCEDURE — 84100 ASSAY OF PHOSPHORUS: CPT | Performed by: INTERNAL MEDICINE

## 2021-06-01 PROCEDURE — 86140 C-REACTIVE PROTEIN: CPT | Performed by: INTERNAL MEDICINE

## 2021-06-01 PROCEDURE — 85652 RBC SED RATE AUTOMATED: CPT | Performed by: INTERNAL MEDICINE

## 2021-06-02 ENCOUNTER — APPOINTMENT (OUTPATIENT)
Dept: SPEECH THERAPY | Facility: HOSPITAL | Age: 81
End: 2021-06-02
Attending: INTERNAL MEDICINE
Payer: MEDICARE

## 2021-06-02 ENCOUNTER — ORDER TRANSCRIPTION (OUTPATIENT)
Dept: PHYSICAL THERAPY | Facility: HOSPITAL | Age: 81
End: 2021-06-02

## 2021-06-02 DIAGNOSIS — R13.19 ESOPHAGEAL DYSPHAGIA: Primary | ICD-10-CM

## 2021-06-07 ENCOUNTER — HOSPITAL ENCOUNTER (INPATIENT)
Facility: HOSPITAL | Age: 81
LOS: 4 days | Discharge: HOME HEALTH | DRG: 862 | End: 2021-06-11
Attending: EMERGENCY MEDICINE | Admitting: FAMILY MEDICINE
Payer: MEDICARE

## 2021-06-07 ENCOUNTER — APPOINTMENT (OUTPATIENT)
Dept: CT IMAGING | Facility: HOSPITAL | Age: 81
DRG: 862 | End: 2021-06-07
Attending: EMERGENCY MEDICINE
Payer: MEDICARE

## 2021-06-07 DIAGNOSIS — R06.1 STRIDOR: ICD-10-CM

## 2021-06-07 DIAGNOSIS — J38.4 LARYNGEAL EDEMA: ICD-10-CM

## 2021-06-07 DIAGNOSIS — R13.10 DYSPHAGIA, UNSPECIFIED TYPE: Primary | ICD-10-CM

## 2021-06-07 PROCEDURE — 70491 CT SOFT TISSUE NECK W/DYE: CPT | Performed by: EMERGENCY MEDICINE

## 2021-06-07 RX ORDER — FUROSEMIDE 10 MG/ML
20 INJECTION INTRAMUSCULAR; INTRAVENOUS ONCE
Status: COMPLETED | OUTPATIENT
Start: 2021-06-08 | End: 2021-06-08

## 2021-06-07 RX ORDER — LORAZEPAM 2 MG/ML
0.5 INJECTION INTRAMUSCULAR 2 TIMES DAILY
Status: DISCONTINUED | OUTPATIENT
Start: 2021-06-08 | End: 2021-06-09

## 2021-06-07 RX ORDER — SODIUM CHLORIDE 9 MG/ML
INJECTION, SOLUTION INTRAVENOUS CONTINUOUS
Status: DISCONTINUED | OUTPATIENT
Start: 2021-06-08 | End: 2021-06-11

## 2021-06-07 RX ORDER — DEXAMETHASONE SODIUM PHOSPHATE 10 MG/ML
10 INJECTION, SOLUTION INTRAMUSCULAR; INTRAVENOUS ONCE
Status: COMPLETED | OUTPATIENT
Start: 2021-06-07 | End: 2021-06-07

## 2021-06-07 NOTE — ED PROVIDER NOTES
Patient Seen in: [facility] AND [facility] Emergency Department      History   No chief complaint on file.     Stated Complaint: Throat Swelling     HPI/Subjective:   HPI    78year old female with multiple medical issues including GERD, hypertension, high choles Past Surgical History:   Procedure Laterality Date   • BACK SURGERY  10/05/2020    C5-7 cervical fusion with Dr. Yarelis Mcgrath @ [facility]   •      • CATARACT Bilateral     Dec 2018 (R), 2019 (L)   • CHOLECYSTECTOMY  3/19/15     Laparoscopic by Dr. Elio Norton transmitted upper airway sounds, gurgling both on inspiration and expiration. Cardiovascular:      Rate and Rhythm: Normal rate and regular rhythm. Heart sounds: Normal heart sounds. No murmur heard.      Pulmonary:      Effort: Pulmonary effort is n Readings from Last 1 Encounters:  06/08/21 : 94  , sinus, normal for rate and rhythm     Radiology findings: CT SOFT TISSUE OF NECK(CONTRAST ONLY) (CPT=70491)    Result Date: 6/7/2021  CONCLUSION:  1.  Findings again noted consistent with a perforation of t levothyroxine Sodium (SYNTHROID) 12.6 mcg in Sodium Chloride (PF) 0.9 % IV push (has no administration in time range)   Ampicillin-Sulbactam Sodium (UNASYN) 3 g in sodium chloride 0.9% 100 mL IVPB-MBP (0 g Intravenous Stopped 6/7/21 9396)   iopamidol (IS

## 2021-06-07 NOTE — PROGRESS NOTES
Pt having issues with same problem with breathing she had upon last admittance when this  had visited her. Pt's son Dell Rivera was present and attentive at bedside. Provided empathic presence, active listening, and prayer. Will follow up if admitted.

## 2021-06-07 NOTE — ED QUICK NOTES
PICC line flushing slowly and no blood return noted, new peripheral IV line started. Dr. Willis Bravo notified.

## 2021-06-07 NOTE — ED QUICK NOTES
Orders for admission, patient is aware of plan and ready to go upstairs. Any questions, please call ED RN Barth Closs  at [address]    Type of COVID test sent: rapid (-)  COVID Suspicion level: Low    Titratable drug(s) infusing: none  Rate:na    LOC at t

## 2021-06-07 NOTE — ED INITIAL ASSESSMENT (HPI)
Patient complains of having to clear phlegm form her throat, states there is an infection in her throat which she has been taking medicine for, states her symptoms have not gotten better, stridor noted upon breathing

## 2021-06-08 ENCOUNTER — TELEPHONE (OUTPATIENT)
Dept: PHYSICAL THERAPY | Facility: HOSPITAL | Age: 81
End: 2021-06-08

## 2021-06-08 PROCEDURE — 99232 SBSQ HOSP IP/OBS MODERATE 35: CPT | Performed by: OTOLARYNGOLOGY

## 2021-06-08 RX ORDER — METRONIDAZOLE 500 MG/100ML
500 INJECTION, SOLUTION INTRAVENOUS EVERY 8 HOURS
Status: DISCONTINUED | OUTPATIENT
Start: 2021-06-08 | End: 2021-06-11

## 2021-06-08 RX ORDER — DEXAMETHASONE SODIUM PHOSPHATE 10 MG/ML
10 INJECTION, SOLUTION INTRAMUSCULAR; INTRAVENOUS 4 TIMES DAILY
Status: DISCONTINUED | OUTPATIENT
Start: 2021-06-08 | End: 2021-06-11

## 2021-06-08 RX ORDER — MAGNESIUM SULFATE HEPTAHYDRATE 40 MG/ML
2 INJECTION, SOLUTION INTRAVENOUS ONCE
Status: COMPLETED | OUTPATIENT
Start: 2021-06-08 | End: 2021-06-08

## 2021-06-08 NOTE — PROGRESS NOTES
I discussed the patient's care in detail with the patient's physician Dr. Melba Danielson. With the extension of her infection I would maintain n.p.o. but I would also recommend a transfer to a tertiary care institution.   I am concerned that this infection could bec

## 2021-06-08 NOTE — H&P
Jose Angel 42 Patient Status:  Inpatient    1940 MRN [id number]   Location [facility] 2W/SW Attending Born, [address] Day # 1 PCP Julisa Mims DO     Date:  2021  Date of Admis pylori   • KNEE REPLACEMENT SURGERY  1/9/07    right   • OTHER SURGICAL HISTORY  11/30/15    laminectomy L3-L4, L4-5; right L1-L2 lateral microdiscectomy     Family History   Problem Relation Age of Onset   • Cancer Father         eustachian tube   • Cance nontender, nondistended. Positive bowel sounds. No rebound tenderness  Neurologic: No focal neurological deficits. Musculoskeletal: Full range of motion of all extremities. No swelling noted. Integument: No lesions. No erythema.   Psychiatric: Appropria above.   Results of this examination were discussed with the patient's physician, Dr. Marshall Abreu, by Dr. Romaine Raymond at 18:00 on 06/07/2021.   Dictated by (CST): Isaiah Yin MD on 6/07/2021 at 5:38 PM     Finalized by (CST): Isaiah Yin MD on

## 2021-06-08 NOTE — SLP NOTE
RN contacted SLP as patient declined surgical intervention and patient remains NPO until swallow assessment. Pt with significant stridor and oropharyngeal edema. Pt not safe for oral intake at this time.   RN reports steroid intervention initiated this AM

## 2021-06-08 NOTE — SLP NOTE
SLP orders received and acknowledged. Chart reviewed. Pt known to inpatient SLP department. Per RN, patient NPO for probable surgical procedure. SLP to f/u with swallow assessment as patient able to resume oral intake. RN, Maximus Mcgrath, in agreement with POC.

## 2021-06-08 NOTE — CONSULTS
[place] [facility] - [facility] ID CONSULT NOTE     Patient Status:  Inpatient    1940 MRN [id number]   Location [facility] 2W/SW Attending Born, [address] Day # 1 PCP Jaz Tejeda DO       Reason for [facility] ([facility]) non-supine AHI 0 Sao2 Skip 65% autoPAP 6-16 HME   • Other and unspecified hyperlipidemia    • PONV (postoperative nausea and vomiting)     severe nausea and ill feeling for weeks after surgery   • Problems with swallowing    • Sleep apnea     has c-pap.  n levothyroxine Sodium (SYNTHROID) 12.6 mcg in Sodium Chloride (PF) 0.9 % IV push, 12.6 mcg, Intravenous, Daily    Review of Systems:  CONSTITUTIONAL:  No weight loss, weakness or fatigue.   HEENT:  Eyes:  No visual loss, blurred vision, double vision or yell BILT 1.1  --   --    TP 6.2*  --   --        Microbiology: Reviewed in EMR    Radiology: Reviewed    ASSESSMENT:    Antibiotics: Ceftriaxone, flagyl  (unasyn, amoxicillin tid PTA)    78year old female with a history of spinal stenosis, HTN, HOLA, s/p C5- with you and will make further recommendations based on his progress.     [facility]  [phone]  6/8/2021

## 2021-06-08 NOTE — PROGRESS NOTES
Added pt to communion list.   06/08/21 1036   Clinical Encounter Type   Visited With Patient not available   [facility] Encounters   Spiritual Requests During Visit / Hospitalization [address] Patient wants comm

## 2021-06-08 NOTE — PROGRESS NOTES
Pt is awake, sitting in bedside chair, and presenting with stridor. Pt's son [facility] present and attentive. Prayed with pt. Advised pt that since she is currently NPO, communion will have to come at a later time.   Pt is satisfied with that.     06/08/21 133

## 2021-06-08 NOTE — CONSULTS
Thoracic Surgery Consult        Reason for Consultation: Possible     Consulting Physician: Dr. Jeff Robbins    Subjective:      Chief Complaint: worsening stridor    History of Present Illness: Patient is a 78year old, female PMhx: GERD, HTN, high cholesterol, Anesthesia complication    • Anxiety state, unspecified     with panic attacks   • Disorder of thyroid    • Esophageal reflux    • Essential hypertension    • Hearing impairment     bilateral hearing aids   • Hematuria 2011   • High blood pressure    • Hig 91/75 (BP Location: Right arm)   Pulse 116   Temp 97.3 °F (36.3 °C) (Temporal)   Resp 26   Wt 170 lb (77.1 kg)   SpO2 93%   BMI 29.18 kg/m²     General: well appearing female with obvious stridor, sitting well in chair, no acute distress  HEENT: Normocepha in April, she was seen for a removal of a deep fluid collection. Returned to ED last night with worsening stridor after transitioning to nectar thick liquids and PO meds last week.   At this time, if her hardware remains in place it is likely that her perf

## 2021-06-08 NOTE — PLAN OF CARE
Pt arrived from ED A&O x 4 with stridor present while maintaining saturations on RA and slightly tachypneic.  Son and pt said that pt had been \"sounding like this for weeks\" Dr. Haresh Cobian at [facility] and advised her to come to [facility]. Laurie Man management of diabetes  Outcome: Progressing  Goal: Electrolytes maintained within normal limits  Description: INTERVENTIONS:  - Monitor labs and rhythm and assess patient for signs and symptoms of electrolyte imbalances  - Administer electrolyte replaceme

## 2021-06-08 NOTE — CONSULTS
[facility]    PATIENT'S NAME: Brenda Mcmanus   ATTENDING PHYSICIAN: Waldo Ellison DO   CONSULTING PHYSICIAN: Luis Martinez MD   PATIENT ACCOUNT#:   [id number]    LOCATION:  [address] [place] [address] RECORD #:   [id number]       DATE OF BIRTH:  [birth year] recommend an evaluation at a tertiary care center for evaluation of how to manage this fluid collection and infection, and to see if surgical intervention would be necessary in order to be able to clear the infection and the esophageal perforation.   This i

## 2021-06-08 NOTE — CM/SW NOTE
SW self-referral for discharge planning. SW attempted to visit pt earlier but pt was working w/therapy. Son was at bedside. Per chart review & observation, pt w/audible stridor.       Pt lives w/son in a multilevel house, 3 steps to enter & 5 steps t

## 2021-06-08 NOTE — PHYSICAL THERAPY NOTE
PHYSICAL THERAPY EVALUATION - INPATIENT     Room Number: 227/227-A  Evaluation Date: 6/8/2021  Type of Evaluation: Initial   Physician Order: PT Eval and Treat    Presenting Problem: perforated esophogus C7-T3, stridor, dysphagia  Reason for Therapy: Padilla functional deficits include decreased strength, activity tolerance, gait, balance, work of breathing, which are below the patient's pre-admission status.       The patient's Approx Degree of Impairment: 50.57% has been calculated based on documentation in t (obstructive sleep apnea) 5/3/17-PSG    AHI 38 RDI 61 REM AHI 54 Supine AHI 38 non-supine AHI 0 Sao2 Skip 65% autoPAP 6-16 HME   • Other and unspecified hyperlipidemia    • PONV (postoperative nausea and vomiting)     severe nausea and ill feeling for wee difficulty does the patient currently have. ..  -   Turning over in bed (including adjusting bedclothes, sheets and blankets)?: A Little   -   Sitting down on and standing up from a chair with arms (e.g., wheelchair, bedside commode, etc.): A Little   -   M assistive device and supervision   Goal #4   Current Status    Goal #5 Patient to demonstrate independence with home activity/exercise instructions provided to patient in preparation for discharge.    Goal #5   Current Status    Goal #6    Goal #6  Current

## 2021-06-08 NOTE — HOME CARE LIAISON
This is a current patient of [facility] and will need a JOSE RAMON order on or before the day of DC.   Services: RN/PT

## 2021-06-08 NOTE — CONSULTS
[facility] Patient Status:  Inpatient   Date of Birth 1/2/1940 MRN [id number]   Location [facility] 2W/SW Attending Born, [address] Day # 1 PCP Maik Door, DO     Date of Consultation has c-pap. not using presently   • Tremor    • Vitamin D insufficiency 3/6/2017       Medications:     Prior to Admission medications :  Medication ertapenem 1 g 1 g in sodium chloride 0.9% 100 mL, Sig Inject 1 g into the vein daily. , Start Date 5/4/21, En Sodium Chloride (PF) 0.9 % IV push, 12.6 mcg, Intravenous, Daily         Family History:     Family History   Problem Relation Age of Onset   • Cancer Father         eustachian tube   • Cancer Mother         unknown primary   • Heart Disorder Maternal [facility] [place] [facility], CT SOFT TISSUE OF NECK (CONTRAST ONLY) (CPT=70491), 4/24/2021, 4:36 PM.     INDICATIONS:  Recurrent throat swelling.   History of a deep cervical infection that developed following ACDF at C5-C7 in October 2020, status post [facility] the parotid glands. The submandibular and thyroid glands are unremarkable. LYMPH NODES:            No pathological-appearing or enlarged lymph nodes.     VASCULATURE:            Mild atherosclerotic calcifications are again noted in the carotid bifurcat the laryngeal airway is also stable. 3. Lesser incidental findings as above. Results of this examination were discussed with the patient's physician, Dr. Lorena Gomez, by Dr. Robert Saldivar at 18:00 on 06/07/2021.      Dictated by (CST): Kelly Lechuga

## 2021-06-08 NOTE — DIETARY NOTE
ADULT NUTRITION INITIAL ASSESSMENT    Pt is at high nutrition risk. Pt meets severe malnutrition criteria.       CRITERIA FOR MALNUTRITION DIAGNOSIS:  Criteria for severe malnutrition diagnosis: acute illness/injury related to wt loss greater than 7.5% in and NTL. Started with small portions and gradually increased. This is when inflammation became worse. Pt denied any pain or swallowing difficulty when diet started. Discussed POC with GI.  Plan is to restart TPN as NGT and PEG are not feasible at this time Body Wt: 215 lbs       79% UBW    WEIGHT HISTORY:  Patient Weight(s) for the past 336 hrs:   Weight   06/07/21 1450 77.1 kg (170 lb)     Wt Readings from Last 10 Encounters:  06/07/21 : 77.1 kg (170 lb)  04/30/21 : 76.4 kg (168 lb 8 oz)  03/27/21 : 88.4 kg Cole Ruby  MS, [address] [facility], C/ Marvin De Robert 81

## 2021-06-09 ENCOUNTER — APPOINTMENT (OUTPATIENT)
Dept: SPEECH THERAPY | Facility: HOSPITAL | Age: 81
End: 2021-06-09
Attending: INTERNAL MEDICINE
Payer: MEDICARE

## 2021-06-09 ENCOUNTER — APPOINTMENT (OUTPATIENT)
Dept: GENERAL RADIOLOGY | Facility: HOSPITAL | Age: 81
DRG: 862 | End: 2021-06-09
Attending: FAMILY MEDICINE
Payer: MEDICARE

## 2021-06-09 PROCEDURE — 3E0436Z INTRODUCTION OF NUTRITIONAL SUBSTANCE INTO CENTRAL VEIN, PERCUTANEOUS APPROACH: ICD-10-PCS | Performed by: FAMILY MEDICINE

## 2021-06-09 PROCEDURE — 71045 X-RAY EXAM CHEST 1 VIEW: CPT | Performed by: FAMILY MEDICINE

## 2021-06-09 RX ORDER — LORAZEPAM 2 MG/ML
0.5 INJECTION INTRAMUSCULAR NIGHTLY
Status: DISCONTINUED | OUTPATIENT
Start: 2021-06-09 | End: 2021-06-11

## 2021-06-09 NOTE — PROGRESS NOTES
[facility] AND [facility]  GI Progress Note      Esa Nguyen Patient Status:  Inpatient    1940 MRN [id number]   Location [facility] 2W/SW Attending Born, [address] Day # 2 PCP Malcolm Ribera DO          SUBJECTIVE:     The patient is curre

## 2021-06-09 NOTE — SPIRITUAL CARE NOTE
Visited while rounding, pt sitting in chair with son at bedside. Both indicated they had no needs, but thank the  for visiting. Chaplains are available 24 hours a day at [phone]. Rev. Duane Campa [facility]  Ext. [phone]

## 2021-06-09 NOTE — DIETARY NOTE
Brief Nutrition Note     Consult received to order TPN. Please seen initial note from 6/8 for full assessment.  Please discontinue IVFs      ESTIMATED NUTRITION NEEDS: Dosing wt: 54.5 kg  Calories: 6181-3369 calories/day (30-32 calories per kg Ideal body wt

## 2021-06-09 NOTE — PLAN OF CARE
Pt A&O x 4 and slept soundly throughout the night with 2L O2. Stridor became audibly worse by morning, pt states she feels fine. Increase in nursing rounding to monitor stridor.    Problem: CARDIOVASCULAR - ADULT  Goal: Maintains optimal cardiac output and Progressing     Problem: GENITOURINARY - ADULT  Goal: Absence of urinary retention  Description: INTERVENTIONS:  - Assess patient’s ability to void and empty bladder  - Monitor intake/output and perform bladder scan as needed  - Follow urinary retention pr Problem: SKIN/TISSUE INTEGRITY - ADULT  Goal: Skin integrity remains intact  Description: INTERVENTIONS  - Assess and document risk factors for pressure ulcer development  - Assess and document skin integrity  - Monitor for areas of redness and/or skin b antiarrhythmic and heart rate control medications as ordered  - Initiate emergency measures for life threatening arrhythmias  - Monitor electrolytes and administer replacement therapy as ordered  Outcome: Progressing     Problem: RESPIRATORY - ADULT  Goal: labs and rhythm and assess patient for signs and symptoms of electrolyte imbalances  - Administer electrolyte replacement as ordered  - Monitor response to electrolyte replacements, including rhythm and repeat lab results as appropriate  - Fluid restrictio speech pathologist) if coughing or persistent throat clearing or wet/gurgly vocal quality is noted  Outcome: Progressing

## 2021-06-09 NOTE — SLP NOTE
SLP reviewed pt's chart extensively. Per Dr. Peter Arnold note on 6/8/21  \"I discussed the patient's care in detail with the patient's physician Dr. Jay Nguyen.  With the extension of her infection I would maintain n.p.o. but I would also recommend a transfer to a te

## 2021-06-09 NOTE — PROGRESS NOTES
INFECTIOUS DISEASE PROGRESS NOTE  [facility]    Yari Esquivel Patient Status:  Inpatient    1940 MRN [id number]   Location [facility] 2W/SW Attending Born, [address] Day # 2 PCP Fernando OTERO airway, initial encounter     Perforation of esophagus     Stridor     Laryngeal edema      ASSESSMENT:    Antibiotics: Ceftriaxone, flagyl  (unasyn, amoxicillin tid PTA)     78year old female with a history of spinal stenosis, HTN, HOLA, s/p C5-C7 spinal [phone]  6/9/2021

## 2021-06-09 NOTE — PLAN OF CARE
Patient sat on chair most of the day ambulating with assistance. She was restarted on steroids which significantly improved her symptoms and for the most part she has remained comfortable.  SLP will re-evaluate tomorrow to see if she can begin eating PO how ADULT  Goal: Achieves optimal ventilation and oxygenation  Description: INTERVENTIONS:  - Assess for changes in respiratory status  - Assess for changes in mentation and behavior  - Position to facilitate oxygenation and minimize respiratory effort  - Oxyg restriction as ordered  - Instruct patient on fluid and nutrition restrictions as appropriate  Outcome: Progressing  Goal: Hemodynamic stability and optimal renal function maintained  Description: INTERVENTIONS:  - Monitor labs and assess for signs and sym

## 2021-06-09 NOTE — PROGRESS NOTES
[facility]    Progress Note    Evens Gaviria Patient Status:  Inpatient    1940 MRN [id number]   Location [facility] 2W/SW Attending Born, [address] Day # 2 PCP Ej Augustine DO       Subjective:   Evens Gaviria is a 111 06/09/2021    CO2 25.0 06/09/2021     (H) 06/09/2021    CA 9.0 06/09/2021    ALB 3.1 (L) 06/01/2021    ALKPHO 77 06/01/2021    BILT 1.1 06/01/2021    TP 6.2 (L) 06/01/2021    AST 15 06/01/2021    ALT 19 06/01/2021    PTT 29.7 03/16/2021    INR 1

## 2021-06-10 RX ORDER — FAMOTIDINE 10 MG/ML
20 INJECTION, SOLUTION INTRAVENOUS 2 TIMES DAILY
Status: DISCONTINUED | OUTPATIENT
Start: 2021-06-10 | End: 2021-06-11

## 2021-06-10 RX ORDER — FUROSEMIDE 10 MG/ML
20 INJECTION INTRAMUSCULAR; INTRAVENOUS ONCE
Status: COMPLETED | OUTPATIENT
Start: 2021-06-10 | End: 2021-06-10

## 2021-06-10 NOTE — PLAN OF CARE
No stridor while awake, holds conversation easily. Stridor sound when in deep sleep this morning. No complaints of pain. Able to  room throughout day and walk to bathroom frequently with her own walker. Up in chair all day.  Son at bedside all day artery perfusion - ex.  Angina  - Evaluate fluid balance, assess for edema, trend weights  Outcome: Progressing  Goal: Absence of cardiac arrhythmias or at baseline  Description: INTERVENTIONS:  - Continuous cardiac monitoring, monitor vital signs, obtain 1 medications to maintain glucose within target range  - Assess barriers to adequate nutritional intake and initiate nutrition consult as needed  - Instruct patient on self management of diabetes  Outcome: Progressing  Goal: Electrolytes maintained within no Interventions:  - Patient should be alert and upright for all feedings (90 degrees preferred)  - Offer food and liquids at a slow rate  - No straws  - Encourage small bites of food and small sips of liquid  - Offer pills one at a time, crush or deliver wit

## 2021-06-10 NOTE — PHYSICAL THERAPY NOTE
PHYSICAL THERAPY TREATMENT NOTE - INPATIENT     Room Number: 227/227-A       Presenting Problem: perforated esophogus C7-T3, stridor, dysphagia    Problem List  Principal Problem:    Dysphagia, unspecified type  Active Problems:    Stridor    Laryngeal erich PAIN ASSESSMENT   Ratin          BALANCE                                                                                                                     Static Sitting: Fair -  Dynamic Sitting: Fair -           Static Standing: Fair -  Dynamic supportive    THERAPEUTIC EXERCISES  Lower Extremity Alternating marching  Ankle pumps  Hip AB/AD  Heel raises  LAQ  SLR  Toe raises     Position Sitting & Standing       Patient End of Session: Up in chair;Needs met;Call light within reach;RN aware of ses

## 2021-06-10 NOTE — PLAN OF CARE
Problem: CARDIOVASCULAR - ADULT  Goal: Maintains optimal cardiac output and hemodynamic stability  Description: INTERVENTIONS:  - Monitor vital signs, rhythm, and trends  - Monitor for bleeding, hypotension and signs of decreased cardiac output  - Evalua intake/output and perform bladder scan as needed  - Follow urinary retention protocol/standard of care  - Consider collaborating with pharmacy to review patient's medication profile  - Implement strategies to promote bladder emptying  Outcome: Progressing

## 2021-06-10 NOTE — PLAN OF CARE
Patient sat on chair most of the day ambulating with assistance. Stridor was minimal with new dose of steroids, she remains NPO for now and will be restarted on TPN overnight.  Patient and family would like for her to go home on abx and steroids and they wi replacement therapy as ordered  Outcome: Progressing     Problem: RESPIRATORY - ADULT  Goal: Achieves optimal ventilation and oxygenation  Description: INTERVENTIONS:  - Assess for changes in respiratory status  - Assess for changes in mentation and behavi electrolyte replacements, including rhythm and repeat lab results as appropriate  - Fluid restriction as ordered  - Instruct patient on fluid and nutrition restrictions as appropriate  Outcome: Progressing  Goal: Hemodynamic stability and optimal renal fun Free from bleeding injury  Description: (Example usage: patient with low platelets)  INTERVENTIONS:  - Avoid intramuscular injections, enemas and rectal medication administration  - Ensure safe mobilization of patient  - Hold pressure on venipuncture sites

## 2021-06-10 NOTE — PROGRESS NOTES
[facility] AND CLINICS  GI Progress Note      Vazquez Adorno Patient Status:  Inpatient    1940 MRN [id number]   Location [facility] 2W/SW Attending Born, [address] Day # 3 PCP Jay Jay Gonzalezly, DO          SUBJECTIVE:     The patient is sleep 4. G-tube or J-tube should be considered. However, given her current clinical state, EGD is contraindicated. The tube would need to be placed surgically. 5. No further GI recommendations at this time. Will sign-off case. Please call if needed.      Geor

## 2021-06-10 NOTE — CM/SW NOTE
6/10: Spoke w/ Charlene Kam ID/PA who states patient had home IV abx which stopped about 2 weeks ago, Charlene Kam anticipates patient will need long term IV abx again. Met with patient and son Shawna Garza for discharge planning. Confirmed patient lives with Brigid Arndt his family.  P

## 2021-06-10 NOTE — PLAN OF CARE
Problem: CARDIOVASCULAR - ADULT  Goal: Maintains optimal cardiac output and hemodynamic stability  Description: INTERVENTIONS:  - Monitor vital signs, rhythm, and trends  - Monitor for bleeding, hypotension and signs of decreased cardiac output  - Evalua Christel Lepe RN  Outcome: Progressing     Problem: GENITOURINARY - ADULT  Goal: Absence of urinary retention  Description: INTERVENTIONS:  - Assess patient’s ability to void and empty bladder  - Monitor intake/output and perform bladder scan as need

## 2021-06-10 NOTE — PROGRESS NOTES
[place] [facility] - [facility]    Ortho Medical Progress Note     Gil Thao Patient Status:  Inpatient    1940 MRN [id number]   Location [facility] 2W/SW Attending Born, [address] Day # 3 PCP Dilia Clemens DO       Subjective:   Brayden Enciso  (H) 06/10/2021    CO2 28.0 06/10/2021    CO2 28.0 06/10/2021     (H) 06/10/2021     (H) 06/10/2021    CA 8.7 06/10/2021    CA 8.7 06/10/2021    ALB 2.7 (L) 06/10/2021    ALKPHO 48 (L) 06/10/2021    BILT 0.3 06/10/2021    TP 5.5 (L)

## 2021-06-11 ENCOUNTER — APPOINTMENT (OUTPATIENT)
Dept: CT IMAGING | Facility: HOSPITAL | Age: 81
DRG: 862 | End: 2021-06-11
Attending: FAMILY MEDICINE
Payer: MEDICARE

## 2021-06-11 VITALS
OXYGEN SATURATION: 96 % | WEIGHT: 178.56 LBS | RESPIRATION RATE: 14 BRPM | HEART RATE: 71 BPM | TEMPERATURE: 98 F | DIASTOLIC BLOOD PRESSURE: 59 MMHG | BODY MASS INDEX: 31 KG/M2 | SYSTOLIC BLOOD PRESSURE: 144 MMHG

## 2021-06-11 PROCEDURE — 70491 CT SOFT TISSUE NECK W/DYE: CPT | Performed by: FAMILY MEDICINE

## 2021-06-11 RX ORDER — DEXAMETHASONE SODIUM PHOSPHATE 10 MG/ML
10 INJECTION, SOLUTION INTRAMUSCULAR; INTRAVENOUS DAILY
Qty: 5 ML | Refills: 0 | Status: SHIPPED | OUTPATIENT
Start: 2021-06-12 | End: 2021-06-17

## 2021-06-11 RX ORDER — LORAZEPAM 2 MG/ML
0.5 INJECTION INTRAMUSCULAR NIGHTLY
Qty: 10 EACH | Refills: 0 | Status: ON HOLD | OUTPATIENT
Start: 2021-06-11 | End: 2021-06-23

## 2021-06-11 RX ORDER — DEXAMETHASONE SODIUM PHOSPHATE 10 MG/ML
10 INJECTION, SOLUTION INTRAMUSCULAR; INTRAVENOUS DAILY
Status: DISCONTINUED | OUTPATIENT
Start: 2021-06-12 | End: 2021-06-11

## 2021-06-11 NOTE — PLAN OF CARE
Rosemarie Wheat is alert, up and moving, and has no stridor today. TPN infusing continuously. Plan to discharge home with IV antibiotic and IV medication per Dr. Jluis Edward.     Problem: Patient Centered Care  Goal: Patient preferences are identified and integrated in the cardiac arrhythmias or at baseline  Description: INTERVENTIONS:  - Continuous cardiac monitoring, monitor vital signs, obtain 12 lead EKG if indicated  - Evaluate effectiveness of antiarrhythmic and heart rate control medications as ordered  - Initiate rosenda lab results as appropriate  - Fluid restriction as ordered  - Instruct patient on fluid and nutrition restrictions as appropriate  Outcome: Adequate for Discharge  Goal: Hemodynamic stability and optimal renal function maintained  Description: INTERVENTION brush  - Limit straining and forceful nose blowing  Outcome: Adequate for Discharge

## 2021-06-11 NOTE — PROGRESS NOTES
INFECTIOUS DISEASE PROGRESS NOTE  [facility] ID PROGRESS NOTE    Panchito Jin Patient Status:  Inpatient    1940 MRN [id number]   Location [facility] 2W/SW Attending Born, [address] Day # 4 PCP Eliseo Serrano.  SHANNA myelopathy (HCC)     Dysphagia     Dysphagia, unspecified type     Aspiration into airway, initial encounter     Perforation of esophagus     Stridor     Laryngeal edema      ASSESSMENT:    Antibiotics: Ceftriaxone, flagyl  (unasyn, amoxicillin tid PTA)    place.  -  Decadron per primary.  -  Follow fever curve, wbc. -  Reviewed labs, micro, imaging reports.  -  Case d/w patient, RN.     Abdiel Qureshi PA-C   [facility]  [phone]  6/9/2021

## 2021-06-11 NOTE — PHYSICAL THERAPY NOTE
Attempted to see pt for PT treatment, pt off the floor for imaging. Will re-attempt later today if pt is medically appropriate and willing.     Yousuf Nick, PT, DPT

## 2021-06-11 NOTE — PROGRESS NOTES
Pt is sitting in bedside chair with son Shawna Garza present and attentive. Pt's stridor is very minimal, breathing and speaking with more ease than before. Pt is very appreciative of Spiritual Care staff and [employer].   Engaged in reflective listenin

## 2021-06-11 NOTE — PROGRESS NOTES
[facility]    Progress Note    Evens Gaviria Patient Status:  Inpatient    1940 MRN [id number]   Location [facility] 2W/SW Attending Born, [address] Day # 4 PCP Ej Augustine DO       Subjective:   Evens Gaviria is a 06/10/2021    AST 7 (L) 06/10/2021    ALT 15 06/10/2021    PTT 29.7 03/16/2021    INR 1.11 03/16/2021    TSH 3.080 03/16/2021    ESRML 13 06/10/2021    CRP <0.29 06/10/2021    MG 2.3 06/11/2021    PHOS 3.1 06/11/2021    TROP <0.017 07/27/2015    B12 >2,000

## 2021-06-11 NOTE — PLAN OF CARE
No complaints of pain throughout the shift, stridor audible while asleep. Patient able to maintain oxygen levels within normal limits. Blood sugar trending up, please refer to flow sheet.        Problem: CARDIOVASCULAR - ADULT  Goal: Maintain promote bladder emptying  Outcome: Progressing     Problem: METABOLIC/FLUID AND ELECTROLYTES - ADULT  Goal: Glucose maintained within prescribed range  Description: INTERVENTIONS:  - Monitor Blood Glucose as ordered  - Assess for signs and symptoms of hype

## 2021-06-13 NOTE — DISCHARGE SUMMARY
[place] [facility] - [facility]    Discharge Summary    Evens Gaviria Patient Status:  Inpatient    1940 MRN [id number]   Location [facility] 2W/SW Attending No att. providers found   Hosp Day # 4 PCP Ej Augustine DO     Date of Admission:  Diya Munroe MD  Consulting Physician  INFECTIOUS DISEASES    CHU Ponce  Consulting Physician  Nurse Practitioner              Discharge Plan:   Discharge Condition: Stable    Discharge Medication List as of 6/11/2021  4:36 PM    New Orders from the home health agency will contact you or your family to schedule your first visit.             Kt Waller  6/13/2021

## 2021-06-16 ENCOUNTER — TELEPHONE (OUTPATIENT)
Dept: INTERNAL MEDICINE UNIT | Facility: HOSPITAL | Age: 81
End: 2021-06-16

## 2021-06-16 ENCOUNTER — LAB REQUISITION (OUTPATIENT)
Dept: LAB | Facility: HOSPITAL | Age: 81
End: 2021-06-16
Payer: MEDICARE

## 2021-06-16 ENCOUNTER — PATIENT OUTREACH (OUTPATIENT)
Dept: CASE MANAGEMENT | Age: 81
End: 2021-06-16

## 2021-06-16 DIAGNOSIS — R63.4 ABNORMAL WEIGHT LOSS: ICD-10-CM

## 2021-06-16 DIAGNOSIS — M46.22 OSTEOMYELITIS OF VERTEBRA, CERVICAL REGION (HCC): ICD-10-CM

## 2021-06-16 DIAGNOSIS — R13.19 ESOPHAGEAL DYSPHAGIA: Primary | ICD-10-CM

## 2021-06-16 DIAGNOSIS — R22.1 NECK SWELLING: ICD-10-CM

## 2021-06-16 DIAGNOSIS — K22.3 PERFORATION OF ESOPHAGUS: ICD-10-CM

## 2021-06-16 PROCEDURE — 80061 LIPID PANEL: CPT | Performed by: INTERNAL MEDICINE

## 2021-06-16 PROCEDURE — 84100 ASSAY OF PHOSPHORUS: CPT | Performed by: INTERNAL MEDICINE

## 2021-06-16 PROCEDURE — 80053 COMPREHEN METABOLIC PANEL: CPT | Performed by: INTERNAL MEDICINE

## 2021-06-16 PROCEDURE — 86140 C-REACTIVE PROTEIN: CPT | Performed by: INTERNAL MEDICINE

## 2021-06-16 PROCEDURE — 83735 ASSAY OF MAGNESIUM: CPT | Performed by: INTERNAL MEDICINE

## 2021-06-16 NOTE — TELEPHONE ENCOUNTER
Jaycee Quiros has been home since Friday. Piedad Rebollar is calling asking about next step with his mom being able to eat and stop TPN. Per Dr. Jenn Kumar will check CT of neck and have GI advise.

## 2021-06-16 NOTE — PROGRESS NOTES
1ST ATTEMPT AT [address], DO.  [address] [facility] [address] [phone]  Spoke with patients son Azar Mercedes advised he takes care of all appointments no help needed closing encounter

## 2021-06-17 ENCOUNTER — LAB REQUISITION (OUTPATIENT)
Dept: LAB | Age: 81
End: 2021-06-17
Payer: MEDICARE

## 2021-06-17 DIAGNOSIS — T17.908A UNSPECIFIED FOREIGN BODY IN RESPIRATORY TRACT, PART UNSPECIFIED CAUSING OTHER INJURY, INITIAL ENCOUNTER: ICD-10-CM

## 2021-06-17 DIAGNOSIS — K22.3 PERFORATION OF ESOPHAGUS: ICD-10-CM

## 2021-06-17 PROCEDURE — 80053 COMPREHEN METABOLIC PANEL: CPT | Performed by: INTERNAL MEDICINE

## 2021-06-17 PROCEDURE — 85025 COMPLETE CBC W/AUTO DIFF WBC: CPT | Performed by: INTERNAL MEDICINE

## 2021-06-17 PROCEDURE — 85652 RBC SED RATE AUTOMATED: CPT | Performed by: INTERNAL MEDICINE

## 2021-06-20 ENCOUNTER — APPOINTMENT (OUTPATIENT)
Dept: GENERAL RADIOLOGY | Facility: HOSPITAL | Age: 81
DRG: 208 | End: 2021-06-20
Payer: MEDICARE

## 2021-06-20 ENCOUNTER — HOSPITAL ENCOUNTER (INPATIENT)
Facility: HOSPITAL | Age: 81
LOS: 2 days | Discharge: ANOTHER HOSPITAL | DRG: 208 | End: 2021-06-23
Attending: EMERGENCY MEDICINE | Admitting: FAMILY MEDICINE
Payer: MEDICARE

## 2021-06-20 DIAGNOSIS — K22.3 ESOPHAGEAL PERFORATION: ICD-10-CM

## 2021-06-20 DIAGNOSIS — J39.0 RETROPHARYNGEAL ABSCESS: ICD-10-CM

## 2021-06-20 DIAGNOSIS — J38.4 LARYNGEAL EDEMA: ICD-10-CM

## 2021-06-20 DIAGNOSIS — T88.4XXA DIFFICULT AIRWAY FOR INTUBATION, INITIAL ENCOUNTER: ICD-10-CM

## 2021-06-20 DIAGNOSIS — R06.1 STRIDOR: Primary | ICD-10-CM

## 2021-06-20 PROCEDURE — 71045 X-RAY EXAM CHEST 1 VIEW: CPT | Performed by: EMERGENCY MEDICINE

## 2021-06-20 PROCEDURE — 5A1945Z RESPIRATORY VENTILATION, 24-96 CONSECUTIVE HOURS: ICD-10-PCS | Performed by: EMERGENCY MEDICINE

## 2021-06-20 PROCEDURE — 0BH17EZ INSERTION OF ENDOTRACHEAL AIRWAY INTO TRACHEA, VIA NATURAL OR ARTIFICIAL OPENING: ICD-10-PCS | Performed by: EMERGENCY MEDICINE

## 2021-06-20 RX ORDER — DEXAMETHASONE SODIUM PHOSPHATE 10 MG/ML
INJECTION, SOLUTION INTRAMUSCULAR; INTRAVENOUS
Status: COMPLETED
Start: 2021-06-20 | End: 2021-06-20

## 2021-06-20 RX ORDER — ETOMIDATE 2 MG/ML
INJECTION INTRAVENOUS
Status: COMPLETED
Start: 2021-06-20 | End: 2021-06-20

## 2021-06-20 RX ORDER — DEXAMETHASONE SODIUM PHOSPHATE 10 MG/ML
10 INJECTION, SOLUTION INTRAMUSCULAR; INTRAVENOUS ONCE
Status: COMPLETED | OUTPATIENT
Start: 2021-06-20 | End: 2021-06-20

## 2021-06-20 RX ORDER — KETAMINE HYDROCHLORIDE 50 MG/ML
INJECTION, SOLUTION, CONCENTRATE INTRAMUSCULAR; INTRAVENOUS
Status: COMPLETED
Start: 2021-06-20 | End: 2021-06-20

## 2021-06-21 ENCOUNTER — APPOINTMENT (OUTPATIENT)
Dept: ULTRASOUND IMAGING | Facility: HOSPITAL | Age: 81
DRG: 208 | End: 2021-06-21
Attending: INTERNAL MEDICINE
Payer: MEDICARE

## 2021-06-21 ENCOUNTER — APPOINTMENT (OUTPATIENT)
Dept: CT IMAGING | Facility: HOSPITAL | Age: 81
DRG: 208 | End: 2021-06-21
Attending: EMERGENCY MEDICINE
Payer: MEDICARE

## 2021-06-21 PROBLEM — T88.4XXA: Status: ACTIVE | Noted: 2021-06-21

## 2021-06-21 PROBLEM — J39.0 RETROPHARYNGEAL ABSCESS: Status: ACTIVE | Noted: 2021-06-21

## 2021-06-21 PROBLEM — K22.3 ESOPHAGEAL PERFORATION: Status: ACTIVE | Noted: 2021-06-21

## 2021-06-21 PROCEDURE — 70491 CT SOFT TISSUE NECK W/DYE: CPT | Performed by: EMERGENCY MEDICINE

## 2021-06-21 PROCEDURE — 99221 1ST HOSP IP/OBS SF/LOW 40: CPT | Performed by: OTOLARYNGOLOGY

## 2021-06-21 PROCEDURE — 71260 CT THORAX DX C+: CPT | Performed by: EMERGENCY MEDICINE

## 2021-06-21 PROCEDURE — 93970 EXTREMITY STUDY: CPT | Performed by: INTERNAL MEDICINE

## 2021-06-21 RX ORDER — FAMOTIDINE 10 MG/ML
20 INJECTION, SOLUTION INTRAVENOUS 2 TIMES DAILY
Status: DISCONTINUED | OUTPATIENT
Start: 2021-06-21 | End: 2021-06-24

## 2021-06-21 RX ORDER — LORAZEPAM 2 MG/ML
0.5 INJECTION INTRAMUSCULAR NIGHTLY
Status: DISCONTINUED | OUTPATIENT
Start: 2021-06-21 | End: 2021-06-24

## 2021-06-21 RX ORDER — HEPARIN SODIUM AND DEXTROSE 10000; 5 [USP'U]/100ML; G/100ML
INJECTION INTRAVENOUS CONTINUOUS
Status: DISCONTINUED | OUTPATIENT
Start: 2021-06-21 | End: 2021-06-24

## 2021-06-21 RX ORDER — SODIUM CHLORIDE 9 MG/ML
125 INJECTION, SOLUTION INTRAVENOUS CONTINUOUS
Status: DISCONTINUED | OUTPATIENT
Start: 2021-06-21 | End: 2021-06-22

## 2021-06-21 RX ORDER — KETAMINE HYDROCHLORIDE 50 MG/ML
1 INJECTION, SOLUTION, CONCENTRATE INTRAMUSCULAR; INTRAVENOUS ONCE
Status: COMPLETED | OUTPATIENT
Start: 2021-06-21 | End: 2021-06-20

## 2021-06-21 RX ORDER — ETOMIDATE 2 MG/ML
1 INJECTION INTRAVENOUS ONCE
Status: DISCONTINUED | OUTPATIENT
Start: 2021-06-21 | End: 2021-06-21

## 2021-06-21 RX ORDER — DEXMEDETOMIDINE HYDROCHLORIDE 4 UG/ML
INJECTION, SOLUTION INTRAVENOUS CONTINUOUS
Status: DISCONTINUED | OUTPATIENT
Start: 2021-06-21 | End: 2021-06-24

## 2021-06-21 RX ORDER — HEPARIN SODIUM AND DEXTROSE 10000; 5 [USP'U]/100ML; G/100ML
18 INJECTION INTRAVENOUS ONCE
Status: COMPLETED | OUTPATIENT
Start: 2021-06-21 | End: 2021-06-21

## 2021-06-21 NOTE — ED QUICK NOTES
Per Dr. Jeff Hua, RN to stop Precedex infusion d/t patient's blood pressure. IVF bolus still infusing on pressure bag and IVF maintenance fluids remain infusing.

## 2021-06-21 NOTE — RESPIRATORY THERAPY NOTE
Received patient from ER intubated/mechanically ventilated on full support. Current settings: AC16/450/+5/FIO2 weaned to 50%. Pt tolerating well and maintaining appropriate sats. BS heard B/L. No other changes at this time.  RT will continue to monitor quinn

## 2021-06-21 NOTE — PROGRESS NOTES
Pt is well known to this , pt returned to hospital will elevated version of stridor previously experienced, pt is being treated for infection, currently intubated but awake and alert. Pt's son Rashida Long is present and attentive at bedside.   Provided act

## 2021-06-21 NOTE — H&P
[facility]  History and Physical    HPI:   Vandana Jerome is an 78year old female, admitted to [facility] for recurrent stridor due to worsening oropharyngeal edema and difficulty breathing and swallowing.  She is admitted to the state, unspecified     with panic attacks   • Disorder of thyroid    • Esophageal reflux    • Essential hypertension    • Hearing impairment     bilateral hearing aids   • Hematuria 2011   • High blood pressure    • High cholesterol    • Hypothyroidism file    Social Determinants of Health  Financial Resource Strain:       Difficulty of Paying Living Expenses:   Food Insecurity:       Worried About Running Out of Food in the Last Year:       Ran Out of Food in the Last Year:   Transportation Needs:       masses, no hepatosplenomegaly.   EXTREMITIES:  No edema, no cyanosis, no clubbing,         Lab Results   Component Value Date    WBC 9.5 06/21/2021    HGB 10.8 (L) 06/21/2021    HCT 33.4 (L) 06/21/2021    .0 06/21/2021    CREATSERUM 0.47 (L) 06/21/20 Extensive debris and retained secretions within the pharynx with rim enhancement suspicious for pharyngitis.   Interval increase in size of an extraluminal collection along the right lateral aspect of the esophagus which contains gas, fluid and prior barium pulmonology      Patient Active Problem List:     OAB (overactive bladder)     GERD (gastroesophageal reflux disease)     Hypothyroidism     Dyslipidemia     Spinal stenosis     Vitamin B12 deficient megaloblastic anemia     Immunoglobulin deficiency ([facility] [address]

## 2021-06-21 NOTE — PLAN OF CARE
Problem: Patient Centered Care  Goal: Patient preferences are identified and integrated in the patient's plan of care  Description: Interventions:  - What would you like us to know as we care for you?  intubated  - Provide timely, complete, and accurate i output and hemodynamic stability  Description: INTERVENTIONS:  - Monitor vital signs, rhythm, and trends  - Monitor for bleeding, hypotension and signs of decreased cardiac output  - Evaluate effectiveness of vasoactive medications to optimize hemodynamic with activity based on assessment  - Modify environment to reduce risk of injury  - Provide assistive devices as appropriate  - Consider OT/PT consult to assist with strengthening/mobility  - Encourage toileting schedule  Outcome: Progressing     Problem:

## 2021-06-21 NOTE — CONSULTS
[place] [facility] - [facility] ID CONSULT NOTE    Gil Thao Patient Status:  Inpatient    1940 MRN [id number]   Location [facility] 2W/SW Attending Born, [address] Day # 0 PCP Dilia Clemens DO       Reason for [facility] ([facility]) sleep apnea) 5/3/17-PSG    AHI 38 RDI 61 REM AHI 54 Supine AHI 38 non-supine AHI 0 Sao2 Skip 65% autoPAP 6-16 HME   • Other and unspecified hyperlipidemia    • PONV (postoperative nausea and vomiting)     severe nausea and ill feeling for weeks after surg mcg/kg/hr, Intravenous, Continuous  •  Piperacillin Sod-Tazobactam So (ZOSYN) 3.375 g in dextrose 5% 100 mL IVPB-ADDV, 3.375 g, Intravenous, Q8H    Review of Systems:  Unable to obtain as patient is intubated.     Physical Exam:  Vital signs: Blood pressure 3/2021 for revision but in OR had deep purulent fluid collection s/p I&D, cx with strep anginosus and Eikenella corrodens, barium esophagram negative for fistula or leak, required intubation for several days due to stridor, was on steroid taper, was on [place]

## 2021-06-21 NOTE — ED QUICK NOTES
Pt successfully intubated by Dr. Sven Deleon, lung sounds ausculated and color change noted on capnometer. Pt attached to ventilator by RT. Vital signs stable.

## 2021-06-21 NOTE — CONSULTS
[facility]    PATIENT'S NAME: Debbie Jeff   ATTENDING PHYSICIAN: Almaz Cox DO   CONSULTING PHYSICIAN: Salil V. Kitty Canavan, MD   PATIENT ACCOUNT#:   [id number]    LOCATION:  [address] #:   [id number]       DATE OF BIRTH:  [birth year] she will be maintained n.p.o. The steroids seem to temporarily decrease her swelling but the swelling comes back after the steroids stop.   She has been on antibiotics almost continuously during this time but does not seem that this is controlling her swel

## 2021-06-21 NOTE — PROGRESS NOTES
[address] [facility]    CDI Prediction Tool Protocol (Vancomycin Prophylaxis Deferred)      This patient is currently at high risk for developing CDI due to his/her score being >/= 13 points.   The current score is: 17.1    Score Breakdown:  History of CDI

## 2021-06-21 NOTE — ED QUICK NOTES
MD at bedside, timeout performed regarding patient intubation. Pt placed on defib pads, airway cart outside room, ventilator at bedside. Pt still stridorous and labored, but agrees to intubation. Pt's son asked to leave room at this time.

## 2021-06-21 NOTE — ED QUICK NOTES
Pt arrvied to ED with audible stridor, pt speaking but having trouble d/t vbreathing. Pt vissibly labored, pt attached to cardiac and spo02 monitor, ED MD, RNx3, and EDTx2 all at bedside. RT called.  Nonrebreather placed on patient, 02 saturation is %

## 2021-06-21 NOTE — DIETARY NOTE
ADULT NUTRITION INITIAL ASSESSMENT    Pt is at high nutrition risk. Pt meets moderate malnutrition criteria.       CRITERIA FOR MALNUTRITION DIAGNOSIS:  Criteria for non-severe malnutrition diagnosis: chronic illness related to wt loss 10% in 6 months, bod this and she was treated with drainage and IV antibiotics for a prolonged course at home. Pt sent home on TPN at that time. Per notes/pt son TPN stopped ~6/1 and pt started on soft foods, ground meat and NTL for ~9-10 days.  Started with small portions and 30-34.9 kg/m2 - obesity class I  IBW: 120 lbs        145% IBW  Usual Body Wt: 215 lbs       81% UBW    WEIGHT HISTORY:  Patient Weight(s) for the past 336 hrs:   Weight   06/21/21 0330 79.6 kg (175 lb 7.8 oz)   06/20/21 2322 80.7 kg (178 lb)     Wt Reading Monitor: TPN initiation, TPN tolerance, adequacy of TPN and for TPN adjustment  - Anthropometric Measurement:      Monitor weight  - Nutrition Goals:      maintain wt within 5%, TPN to meet greater than 80% nutrition needs, labs WNL, euglycemia, prevent sk

## 2021-06-21 NOTE — ED QUICK NOTES
Dr. Tiffanie Mcleod notified of pt's blood pressure, verbal orders to cut the rate of propofol from 10 mcg/kg/min to 5 mcg/kg/min and hang IVF infusing at 125mls/hr to 999 mls/hr.

## 2021-06-21 NOTE — CM/SW NOTE
06/21/21 1600   CM/SW Referral Data   Referral Source    Reason for Referral Readmission   Informant   (chart review/[place] staff)   Readmission Assessment   Did any new symptoms or issues develop after you were discharged?  Yes  (stridor)

## 2021-06-21 NOTE — ED QUICK NOTES
Per Dr. Erik Golden, propofol to be discontinued and Levophed to be started through left upper PICC line due to pt's blood pressure.

## 2021-06-21 NOTE — ED QUICK NOTES
ER MD gerber to use PICC line to left upper arm double lumen for medication administration and blood draw.

## 2021-06-21 NOTE — ED QUICK NOTES
Pt transported to unit with RN x2 on cardiac monitor, portable ventilator, and respiratory therapist.

## 2021-06-21 NOTE — CONSULTS
Critical Care Consult     Assessment / Plan:  1. Acute respiratory failure - intubated due to stridor  - continue full vent support  - minimize sedation as able  2.  Esophageal perforation  - CT surgery has seen and recommended transfer to [facility] for hardware has c-pap.  not using presently   • Tremor    • Vitamin D insufficiency 3/6/2017       Past Surgical History:   Procedure Laterality Date   • BACK SURGERY  10/05/2020    C5-7 cervical fusion with Dr. Juanjo Raymundo @ [facility]   •      • CATARACT Bilateral     Harman Aguilar Onset   • Cancer Father         eustachian tube   • Cancer Mother         unknown primary   • Heart Disorder Maternal Grandmother    • Heart Disorder Maternal Grandfather          Exam:   06/21/21  0715 06/21/21  0730 06/21/21  0800 06/21/21  0900   BP: 99

## 2021-06-21 NOTE — ED QUICK NOTES
Pt's blood pressure noted to be normotensive at this time, verbal orders to not initiate levophed but have on stand by per Dr. Elysia Cazares.

## 2021-06-21 NOTE — ED PROVIDER NOTES
Patient Seen in: [facility] AND [facility] Emergency Department      History   Patient presents with:  Difficulty Breathing    Stated Complaint:     HPI/Subjective:   HPI  27-year-old female with hypertension, HOLA, cervical spinal stenosis status post C5-C7 spin Tremor    • Vitamin D insufficiency 3/6/2017              Past Surgical History:   Procedure Laterality Date   • BACK SURGERY  10/05/2020    C5-7 cervical fusion with Dr. Mickey Schuler @ [facility]   •      • CATARACT Bilateral     Dec 2018 (R), 2019 (L)   • Pupils are equal, round, and reactive to light. Cardiovascular:      Rate and Rhythm: Regular rhythm. Tachycardia present. Heart sounds: Normal heart sounds. Pulmonary:      Effort: Tachypnea and accessory muscle usage present.       Breath sounds: PROTHROMBIN TIME (PT) - Normal   SED RATE, WESTERGREN (AUTOMATED) - Normal   RAPID SARS-COV-2 BY PCR - Normal   CBC WITH DIFFERENTIAL WITH PLATELET    Narrative: The following orders were created for panel order CBC With Differential With Platelet. M.D.  This report has been electronically signed and verified by the Radiologist whose name is printed above.     DD:  06/21/2021/DT:  06/21/2021          [facility]    On arrival to the ED, patient presents in extremis with tachypnea, accessory muscle usage and s shows ETT in good position. The procedure was performed by myself.     I spent a total of 60 minutes of critical care time in obtaining history, performing a physical exam, bedside monitoring of interventions, collecting and interpreting tests and discussi

## 2021-06-21 NOTE — CONSULTS
Thoracic Surgery Consult        Reason for Consultation: Worsening stridor and work of breathing    Consulting Physician: Dr. Francisco Conley    Subjective:      Chief Complaint: Worsening stridor and work of breathing    History of Present Illness: Patient is a cholesterol    • Hypothyroidism    • Lumbar spinal stenosis    • Obesity    • HOLA (obstructive sleep apnea) 5/3/17-PSG    AHI 38 RDI 61 REM AHI 54 Supine AHI 38 non-supine AHI 0 Sao2 Skip 65% autoPAP 6-16 HME   • Other and unspecified hyperlipidemia    • and OG tube in place  Heart: regular rate and rhythm. No murmurs, rubs or gallops. No lower extremity edema. Lungs: intubated. FiO2 50. PEEP 5. Bilateral breath sounds  Extremities: No clubbing or cyanosis.  No lateralizing weakness  Neuro: No gross crani [phone]    1215: Patient discussed with Dr. Kathe High. Recommendations as follows:    Esophageal perforation. Not likely to heal with infected hardware in place.   Previously discussed this case with her orthopedic surgeon and we agreed that transfer t

## 2021-06-21 NOTE — ED INITIAL ASSESSMENT (HPI)
Pt presents with hx of infections in the throat. Pt arrives w/ audible stridor. Pt reportsDIB, denies pain.

## 2021-06-21 NOTE — PLAN OF CARE
Pt intubated in ER. Placed on mechanical ventilator support. ABG drawn post-intubation and settings adjusted per MD. Currently on AC/VC: 16r/450vt/+5PEEP/50% fio2. Pt transported to CCU without complication.    Problem: RESPIRATORY - ADULT  Goal: Achieves o

## 2021-06-21 NOTE — ED QUICK NOTES
Per ER MD, RN to prep patient for intubation. RT at bedside. Suction available at, bag mask at bedside.

## 2021-06-21 NOTE — ED QUICK NOTES
Pt taken to CT with RNx2  And RT on cardiac monitor and portable ventilator. Per Dr. Michael Aldana, pt okay to go to CT without kidney function blood work results.

## 2021-06-22 RX ORDER — POTASSIUM CHLORIDE 29.8 MG/ML
40 INJECTION INTRAVENOUS ONCE
Status: COMPLETED | OUTPATIENT
Start: 2021-06-22 | End: 2021-06-22

## 2021-06-22 NOTE — PLAN OF CARE
Problem: RESPIRATORY - ADULT  Goal: Achieves optimal ventilation and oxygenation  Description: INTERVENTIONS:  - Assess for changes in respiratory status  - Assess for changes in mentation and behavior  - Position to facilitate oxygenation and minimize r for physical needs  - Identify cognitive and physical deficits and behaviors that affect risk of falls.   - [place] fall precautions as indicated by assessment.  - Educate pt/family on patient safety including physical limitations  - Instruct pt to call f

## 2021-06-22 NOTE — PLAN OF CARE
Problem: RESPIRATORY - ADULT  Goal: Achieves optimal ventilation and oxygenation  Description: INTERVENTIONS:  - Assess for changes in respiratory status  - Assess for changes in mentation and behavior  - Position to facilitate oxygenation and minimize r Trial Vt 430     2137 : Back on full support, resting and tolerating well, RT will continue to monitor.

## 2021-06-22 NOTE — PROGRESS NOTES
Critical Care Progress Note     Assessment / Plan:  1. Acute respiratory failure - intubated due to stridor  - continue full vent support  - minimize sedation as able  - daily SBT; no plans for extubation at this time  2.  Esophageal perforation  - CT surge

## 2021-06-22 NOTE — PLAN OF CARE
Patient Alert, awake. Following commands. Intubated. Tolerating vent. Vitals stable. On IV abx. Started on TPN today. Started on heparin drip.  Monitoring closely    Problem: Patient Centered Care  Goal: Patient preferences are identified and integrated in CARDIOVASCULAR - ADULT  Goal: Maintains optimal cardiac output and hemodynamic stability  Description: INTERVENTIONS:  - Monitor vital signs, rhythm, and trends  - Monitor for bleeding, hypotension and signs of decreased cardiac output  - Evaluate effectiv limitations  - Instruct pt to call for assistance with activity based on assessment  - Modify environment to reduce risk of injury  - Provide assistive devices as appropriate  - Consider OT/PT consult to assist with strengthening/mobility  - Encourage toil

## 2021-06-22 NOTE — PROGRESS NOTES
INFECTIOUS DISEASE PROGRESS NOTE  [facility] OF BOB ID PROGRESS NOTE    Flores Mcpherson Patient Status:  Inpatient    1940 MRN [id number]   Location [facility] 2W/SW Attending Born, [address] Day # 1 PCP Yoselin Grimm.  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  Plans for

## 2021-06-22 NOTE — PROGRESS NOTES
Pt was being attended to by staff, spoke with pt's son. Son Morales Carrasco noted that staff is indicating that pt will be transferred to another hospital, possibly [facility], in the near future. Pt is intubated and unable to speak but awake and alert.   Will follow up wh

## 2021-06-22 NOTE — CM/SW NOTE
Notified in nursing rounds/chart review that transfer to tertiary center [facility] [place])  is being considered. Disc of imaging ordered from Radiology-(called [facility] [id number] and she will deliver to [address] [facility] in anticipation of transfer.   Lester notif

## 2021-06-22 NOTE — PLAN OF CARE
Patient Alert, awake. Following commands. Intubated. Tolerating vent. Vitals stable. On IV abx. On TPN. Heparin drip therapeutic.  Monitoring closely     Problem: Patient Centered Care  Goal: Patient preferences are identified and integrated in the patien ADULT  Goal: Maintains optimal cardiac output and hemodynamic stability  Description: INTERVENTIONS:  - Monitor vital signs, rhythm, and trends  - Monitor for bleeding, hypotension and signs of decreased cardiac output  - Evaluate effectiveness of vasoacti Instruct pt to call for assistance with activity based on assessment  - Modify environment to reduce risk of injury  - Provide assistive devices as appropriate  - Consider OT/PT consult to assist with strengthening/mobility  - Encourage toileting schedule

## 2021-06-22 NOTE — PROGRESS NOTES
[facility]    Progress Note    Nanytayla Parekh Patient Status:  Inpatient    1940 MRN [id number]   Location [facility] 2W/SW Attending Born, [address] Day # 1 PCP Lea Alcantar DO       Subjective:   Nany Iglesia is a (H) 06/22/2021    BUN 20 (H) 06/22/2021     06/22/2021     06/22/2021    K 3.6 06/22/2021    K 3.6 06/22/2021     06/22/2021     06/22/2021    CO2 23.0 06/22/2021    CO2 23.0 06/22/2021     (H) 06/22/2021     (H) 06/22 pharyngitis. Interval increase in size of an extraluminal collection along the right lateral aspect of the esophagus which contains gas, fluid and prior barium. Findings are suspicious for a contained perforation.   This contained perforation is seen the 09:12 by Rhianna Sidhu MD      **Certification      PHYSICIAN Certification of Need for Inpatient Hospitalization - Initial Certification    Patient will require inpatient services that will reasonably be expected to span two midnight's based on the clinical

## 2021-06-22 NOTE — RESPIRATORY THERAPY NOTE
Received patient intubated with ETT size 6.0 @ 25 cm at the lip, vent settings: AC/VC 16/450/+5/35%. Pt is maintaining SpO2 appropriately, suction provided when indicated. BS heard  AXEL, RT to continue monitor patient.     Weaned FiO2 to 30%, patient is m

## 2021-06-23 VITALS
HEART RATE: 83 BPM | BODY MASS INDEX: 31.62 KG/M2 | RESPIRATION RATE: 24 BRPM | OXYGEN SATURATION: 100 % | TEMPERATURE: 98 F | HEIGHT: 64 IN | WEIGHT: 185.19 LBS | DIASTOLIC BLOOD PRESSURE: 60 MMHG | SYSTOLIC BLOOD PRESSURE: 125 MMHG

## 2021-06-23 NOTE — PLAN OF CARE
Pt remained on vent overnight. No changes made. Suction prn. RT to continue to monitor.    Problem: RESPIRATORY - ADULT  Goal: Achieves optimal ventilation and oxygenation  Description: INTERVENTIONS:  - Assess for changes in respiratory status  - Assess fo

## 2021-06-23 NOTE — PLAN OF CARE
Ms. Kavya Hilton remained in bed today. Frequent range of motion exercises done along with circulation assessments.  Precedex was on & off intermittently to maintain patient comfortable and tolerating the vent, slightly hypotensive when sleeping so precedex was t hypotension and signs of decreased cardiac output  - Evaluate effectiveness of vasoactive medications to optimize hemodynamic stability  - Monitor arterial and/or venous puncture sites for bleeding and/or hematoma  - Assess quality of pulses, skin color an Consider OT/PT consult to assist with strengthening/mobility  - Encourage toileting schedule  Outcome: Progressing     Problem: DISCHARGE PLANNING  Goal: Discharge to home or other facility with appropriate resources  Description: INTERVENTIONS:  - Identif

## 2021-06-23 NOTE — PROGRESS NOTES
[place] [facility]    Ortho Medical Progress Note     Debra Crain Patient Status:  Inpatient    1940 MRN [id number]   Location [facility] 2W/SW Attending Born, [address] Day # 2 PCP Maik Rod, DO       Subjective:   Lina Camargo abscess  Treated by ID      Difficult airway for intubation, initial encounter        Results:     Lab Results   Component Value Date    WBC 6.6 06/23/2021    HGB 9.5 (L) 06/23/2021    HCT 29.4 (L) 06/23/2021    .0 06/23/2021    CREATSERUM 0.64 06/2

## 2021-06-23 NOTE — PROGRESS NOTES
David Phelps Patient Status:  Inpatient    1940 MRN [id number]   Location [facility] 2W/SW Attending Born, [address] Day # 2 PCP Boby Sanchez DO     Critical Care Progress Note      Assessment/Plan:  1.  Acute respiratory failure 3 oz (84 kg)   SpO2 100%   Breastfeeding No   BMI 31.79 kg/m²   Physical Exam:  General: intubated  Skin: no rash, ulcers or subcutaneous nodules  Eyes: anicteric sclerae, moist conjunctivae  Head, ears, nose, throat: atraumatic, oropharynx clear with mois noted.

## 2021-06-23 NOTE — CM/SW NOTE
Received call from [facility] requesting initiation of transfer to [facility] to the MICU/Urgent Care service with consult by Dr Rachelle Hawkins, [address]     Called Narayan Lopez in the [facility] [phone]   Faxed Face Sheet to [phone]    Provide

## 2021-06-23 NOTE — CM/SW NOTE
Called by RN, bed available at [facility] room 1025W    RN to RN report [phone]    [place] setting up transport  ETA 10pm    PCS completed

## 2021-06-23 NOTE — PLAN OF CARE
Soft bilateral wrist restraints were continued due to patient being at risk for discontinuing medical equipment. Range of motion exercises and circulation assessments done frequently. Son at bedside informed for the need to use these.     Problem: Safety Ri

## 2021-06-23 NOTE — PLAN OF CARE
Pt alert and oriented x3, follows command, express needs via writing. Alert and calm. Orally intubated to protect airway d/t strider, esophageal perforation. Pt tolerates vents able to use yanker to suction self. Occasionally c/o anxiety.  On low dose prece hematoma  - Assess quality of pulses, skin color and temperature  - Assess for signs of decreased coronary artery perfusion - ex.  Angina  - Evaluate fluid balance, assess for edema, trend weights  Outcome: Progressing     Problem: PAIN - ADULT  Goal: Verba the patient's plan of care  Description: Interventions:  - What would you like us to know as we care for you?  Need [place] and pen/paper within reach   - Provide timely, complete, and accurate information to patient/family  - Incorporate patient and famil for any contributing factors to confusion (electrolyte disturbances, delirium, medications)  - Discontinue any unnecessary medical devices as soon as possible  - Assess the patient's physical comfort, circulation, skin condition, hydration, nutrition and e

## 2021-06-23 NOTE — PROGRESS NOTES
INFECTIOUS DISEASE PROGRESS NOTE  [place] [facility] - [facility] OF BOB ID PROGRESS NOTE    Modesto Drought Patient Status:  Inpatient    1940 MRN [id number]   Location [facility] 2W/SW Attending Born, [address] Day # 2 PCP Alton Kumar.  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

## 2021-06-23 NOTE — PLAN OF CARE
Pt orally intubated and will continues to require bilateral restraints for safety purposes.     Problem: Safety Risk - Non-Violent Restraints  Goal: Patient will remain free from self-harm  Description: INTERVENTIONS:  - Apply the least restrictive restrain

## 2021-06-24 NOTE — PLAN OF CARE
Pt received intubated, maintaining adequate oxygen saturations on 30% fio2. No complaints of pain. Dx to tertiary facility this evening ~2200, report given to RN.     Problem: RESPIRATORY - ADULT  Goal: Achieves optimal ventilation and oxygenation  Descript social influences on pain and pain management  - Manage/alleviate anxiety  - Utilize distraction and/or relaxation techniques  - Monitor for opioid side effects  - Notify MD/LIP if interventions unsuccessful or patient reports new pain  - Anticipate increa post-hospital services based on physician/LIP order or complex needs related to functional status, cognitive ability or social support system  Outcome: Completed

## 2021-06-25 NOTE — DISCHARGE SUMMARY
[facility]    Discharge Summary    Edy Potts Patient Status:  Inpatient    1940 MRN [id number]   Location [facility] 2W/SW Attending No att. providers found   Hosp Day # 2 PCP Sarita Nixon DO     Date of Admission:  Provider Role Specialty    Flor Dolan MD  Consulting Physician  Surgery, Orthopaedic    Rory Palmer., Veronique Macario MD  Consulting Physician  Timbo Sharif MD  Consulting Physician  INFECTIOUS DISEASES    Luciano Kaplan MD  [facility]

## 2021-07-08 ENCOUNTER — APPOINTMENT (OUTPATIENT)
Dept: SPEECH THERAPY | Facility: HOSPITAL | Age: 81
End: 2021-07-08
Attending: INTERNAL MEDICINE
Payer: MEDICARE

## 2021-07-23 ENCOUNTER — LAB REQUISITION (OUTPATIENT)
Dept: LAB | Facility: HOSPITAL | Age: 81
End: 2021-07-23
Payer: MEDICARE

## 2021-07-23 DIAGNOSIS — K20.80 OTHER ESOPHAGITIS WITHOUT BLEEDING: ICD-10-CM

## 2021-07-23 LAB
BASOPHILS # BLD AUTO: 0.03 X10(3) UL (ref 0–0.2)
BASOPHILS NFR BLD AUTO: 0.5 %
CRP SERPL-MCNC: <0.29 MG/DL (ref ?–0.3)
DEPRECATED RDW RBC AUTO: 49.6 FL (ref 35.1–46.3)
EOSINOPHIL # BLD AUTO: 0.06 X10(3) UL (ref 0–0.7)
EOSINOPHIL NFR BLD AUTO: 1 %
ERYTHROCYTE [DISTWIDTH] IN BLOOD BY AUTOMATED COUNT: 14.2 % (ref 11–15)
HCT VFR BLD AUTO: 36.5 %
HGB BLD-MCNC: 11.4 G/DL
IMM GRANULOCYTES # BLD AUTO: 0.01 X10(3) UL (ref 0–1)
IMM GRANULOCYTES NFR BLD: 0.2 %
LYMPHOCYTES # BLD AUTO: 1.68 X10(3) UL (ref 1–4)
LYMPHOCYTES NFR BLD AUTO: 28.2 %
MCH RBC QN AUTO: 29.8 PG (ref 26–34)
MCHC RBC AUTO-ENTMCNC: 31.2 G/DL (ref 31–37)
MCV RBC AUTO: 95.3 FL
MONOCYTES # BLD AUTO: 0.54 X10(3) UL (ref 0.1–1)
MONOCYTES NFR BLD AUTO: 9.1 %
NEUTROPHILS # BLD AUTO: 3.64 X10 (3) UL (ref 1.5–7.7)
NEUTROPHILS # BLD AUTO: 3.64 X10(3) UL (ref 1.5–7.7)
NEUTROPHILS NFR BLD AUTO: 61 %
PLATELET # BLD AUTO: 175 10(3)UL (ref 150–450)
RBC # BLD AUTO: 3.83 X10(6)UL
SED RATE-ML: 13 MM/HR
WBC # BLD AUTO: 6 X10(3) UL (ref 4–11)

## 2021-07-23 PROCEDURE — 85652 RBC SED RATE AUTOMATED: CPT | Performed by: FAMILY MEDICINE

## 2021-07-23 PROCEDURE — 85025 COMPLETE CBC W/AUTO DIFF WBC: CPT | Performed by: FAMILY MEDICINE

## 2021-07-23 PROCEDURE — 86140 C-REACTIVE PROTEIN: CPT | Performed by: FAMILY MEDICINE

## 2021-08-17 NOTE — H&P
Discharge Summary  Patient ID:  Nidia Balbuena  [id number]  78year old  1/2/1940    Admit date: 3/22/2021    Discharge date and time: 4/1/21    Attending Physician: No att. providers found     Reason for admission: Cervical instrumentation failure    Disc tablet, R-1, FREDIS    !!  Multiple Vitamins-Minerals (PRESERVISION AREDS) Oral Tab  Take by mouth.  , Historical    Calcium Carb-Cholecalciferol (CALTRATE 600+D3 SOFT OR)  Take by mouth.  , Historical    MEGARED OMEGA-3 KRILL OIL OR  Take by mouth.  , Histori

## 2021-08-18 ENCOUNTER — LAB REQUISITION (OUTPATIENT)
Dept: LAB | Facility: HOSPITAL | Age: 81
End: 2021-08-18
Payer: MEDICARE

## 2021-08-18 DIAGNOSIS — J39.0 RETROPHARYNGEAL AND PARAPHARYNGEAL ABSCESS: ICD-10-CM

## 2021-08-18 LAB
ALBUMIN SERPL-MCNC: 3.1 G/DL (ref 3.4–5)
ALBUMIN/GLOB SERPL: 1.4 {RATIO} (ref 1–2)
ALP LIVER SERPL-CCNC: 47 U/L
ALT SERPL-CCNC: 15 U/L
ANION GAP SERPL CALC-SCNC: 5 MMOL/L (ref 0–18)
AST SERPL-CCNC: 14 U/L (ref 15–37)
BILIRUB SERPL-MCNC: 0.6 MG/DL (ref 0.1–2)
BUN BLD-MCNC: 7 MG/DL (ref 7–18)
CALCIUM BLD-MCNC: 9.2 MG/DL (ref 8.5–10.1)
CHLORIDE SERPL-SCNC: 111 MMOL/L (ref 98–112)
CO2 SERPL-SCNC: 26 MMOL/L (ref 21–32)
CREAT BLD-MCNC: 0.42 MG/DL
CRP SERPL-MCNC: <0.29 MG/DL (ref ?–0.3)
GLOBULIN PLAS-MCNC: 2.2 G/DL (ref 2.8–4.4)
GLUCOSE BLD-MCNC: 74 MG/DL (ref 70–99)
M PROTEIN MFR SERPL ELPH: 5.3 G/DL (ref 6.4–8.2)
OSMOLALITY SERPL CALC.SUM OF ELEC: 291 MOSM/KG (ref 275–295)
POTASSIUM SERPL-SCNC: 4.3 MMOL/L (ref 3.5–5.1)
SED RATE-ML: 9 MM/HR
SODIUM SERPL-SCNC: 142 MMOL/L (ref 136–145)
VIT D+METAB SERPL-MCNC: 45.1 NG/ML (ref 30–100)

## 2021-08-18 PROCEDURE — 82306 VITAMIN D 25 HYDROXY: CPT | Performed by: FAMILY MEDICINE

## 2021-08-18 PROCEDURE — 85652 RBC SED RATE AUTOMATED: CPT | Performed by: FAMILY MEDICINE

## 2021-08-18 PROCEDURE — 86140 C-REACTIVE PROTEIN: CPT | Performed by: FAMILY MEDICINE

## 2021-08-18 PROCEDURE — 80053 COMPREHEN METABOLIC PANEL: CPT | Performed by: FAMILY MEDICINE

## 2021-08-24 ENCOUNTER — TELEPHONE (OUTPATIENT)
Dept: FAMILY MEDICINE CLINIC | Facility: CLINIC | Age: 81
End: 2021-08-24

## 2021-11-16 ENCOUNTER — HOSPITAL ENCOUNTER (INPATIENT)
Facility: HOSPITAL | Age: 81
LOS: 16 days | Discharge: HOME HEALTH | DRG: 003 | End: 2021-12-02
Attending: EMERGENCY MEDICINE | Admitting: INTERNAL MEDICINE
Payer: MEDICARE

## 2021-11-16 ENCOUNTER — APPOINTMENT (OUTPATIENT)
Dept: GENERAL RADIOLOGY | Facility: HOSPITAL | Age: 81
DRG: 003 | End: 2021-11-16
Attending: EMERGENCY MEDICINE
Payer: MEDICARE

## 2021-11-16 ENCOUNTER — APPOINTMENT (OUTPATIENT)
Dept: CT IMAGING | Facility: HOSPITAL | Age: 81
DRG: 003 | End: 2021-11-16
Attending: EMERGENCY MEDICINE
Payer: MEDICARE

## 2021-11-16 DIAGNOSIS — T14.8XXA HEMATOMA: ICD-10-CM

## 2021-11-16 DIAGNOSIS — J96.01 ACUTE RESPIRATORY FAILURE WITH HYPOXIA (HCC): ICD-10-CM

## 2021-11-16 DIAGNOSIS — R06.1 BIPHASIC STRIDOR: Primary | ICD-10-CM

## 2021-11-16 DIAGNOSIS — Z43.0 TRACHEOSTOMY CARE (HCC): ICD-10-CM

## 2021-11-16 PROBLEM — R79.89 AZOTEMIA: Status: ACTIVE | Noted: 2021-11-16

## 2021-11-16 PROCEDURE — 70491 CT SOFT TISSUE NECK W/DYE: CPT | Performed by: EMERGENCY MEDICINE

## 2021-11-16 PROCEDURE — 99291 CRITICAL CARE FIRST HOUR: CPT | Performed by: INTERNAL MEDICINE

## 2021-11-16 PROCEDURE — 0BH17EZ INSERTION OF ENDOTRACHEAL AIRWAY INTO TRACHEA, VIA NATURAL OR ARTIFICIAL OPENING: ICD-10-PCS | Performed by: EMERGENCY MEDICINE

## 2021-11-16 PROCEDURE — 5A1955Z RESPIRATORY VENTILATION, GREATER THAN 96 CONSECUTIVE HOURS: ICD-10-PCS | Performed by: EMERGENCY MEDICINE

## 2021-11-16 PROCEDURE — 71260 CT THORAX DX C+: CPT | Performed by: EMERGENCY MEDICINE

## 2021-11-16 PROCEDURE — 71045 X-RAY EXAM CHEST 1 VIEW: CPT | Performed by: EMERGENCY MEDICINE

## 2021-11-16 RX ORDER — LORAZEPAM 2 MG/ML
0.5 INJECTION INTRAMUSCULAR ONCE
Status: COMPLETED | OUTPATIENT
Start: 2021-11-16 | End: 2021-11-16

## 2021-11-16 RX ORDER — AMOXICILLIN AND CLAVULANATE POTASSIUM 250; 125 MG/1; MG/1
250 TABLET, FILM COATED ORAL EVERY 8 HOURS SCHEDULED
COMMUNITY
End: 2021-12-02

## 2021-11-16 RX ORDER — PREDNISONE 20 MG/1
20 TABLET ORAL DAILY
COMMUNITY
End: 2021-12-02

## 2021-11-16 RX ORDER — SODIUM CHLORIDE 9 MG/ML
INJECTION, SOLUTION INTRAVENOUS CONTINUOUS
Status: DISCONTINUED | OUTPATIENT
Start: 2021-11-16 | End: 2021-11-23

## 2021-11-16 RX ORDER — ETOMIDATE 2 MG/ML
INJECTION INTRAVENOUS
Status: COMPLETED
Start: 2021-11-16 | End: 2021-11-16

## 2021-11-16 RX ORDER — ETOMIDATE 2 MG/ML
INJECTION INTRAVENOUS
Status: DISPENSED
Start: 2021-11-16 | End: 2021-11-17

## 2021-11-16 RX ORDER — METHYLPREDNISOLONE SODIUM SUCCINATE 40 MG/ML
40 INJECTION, POWDER, LYOPHILIZED, FOR SOLUTION INTRAMUSCULAR; INTRAVENOUS EVERY 12 HOURS
Status: DISCONTINUED | OUTPATIENT
Start: 2021-11-16 | End: 2021-11-21

## 2021-11-16 RX ORDER — VANCOMYCIN HYDROCHLORIDE
25 ONCE
Status: COMPLETED | OUTPATIENT
Start: 2021-11-16 | End: 2021-11-17

## 2021-11-16 RX ORDER — ETOMIDATE 2 MG/ML
20 INJECTION INTRAVENOUS ONCE
Status: COMPLETED | OUTPATIENT
Start: 2021-11-16 | End: 2021-11-16

## 2021-11-16 RX ORDER — SOLIFENACIN SUCCINATE 5 MG/1
5 TABLET, FILM COATED ORAL DAILY
COMMUNITY

## 2021-11-16 RX ORDER — DEXAMETHASONE SODIUM PHOSPHATE 10 MG/ML
10 INJECTION, SOLUTION INTRAMUSCULAR; INTRAVENOUS ONCE
Status: COMPLETED | OUTPATIENT
Start: 2021-11-16 | End: 2021-11-16

## 2021-11-16 RX ORDER — DEXMEDETOMIDINE HYDROCHLORIDE 4 UG/ML
INJECTION, SOLUTION INTRAVENOUS CONTINUOUS
Status: DISCONTINUED | OUTPATIENT
Start: 2021-11-16 | End: 2021-11-18

## 2021-11-16 RX ORDER — SACCHAROMYCES BOULARDII 250 MG
250 CAPSULE ORAL 2 TIMES DAILY
COMMUNITY

## 2021-11-16 RX ORDER — HEPARIN SODIUM 5000 [USP'U]/ML
5000 INJECTION, SOLUTION INTRAVENOUS; SUBCUTANEOUS EVERY 12 HOURS SCHEDULED
Status: DISCONTINUED | OUTPATIENT
Start: 2021-11-16 | End: 2021-12-02

## 2021-11-16 NOTE — ED INITIAL ASSESSMENT (HPI)
Patient brought in by family for increased anxiety. Patient was recently prescribed prednisone for swelling around the vocal cords. Since then, patient has been afraid to sleep, afraid to eat. Patient breathing appears labored, patient has course stridor.

## 2021-11-17 ENCOUNTER — APPOINTMENT (OUTPATIENT)
Dept: GENERAL RADIOLOGY | Facility: HOSPITAL | Age: 81
DRG: 003 | End: 2021-11-17
Attending: INTERNAL MEDICINE
Payer: MEDICARE

## 2021-11-17 PROCEDURE — 99291 CRITICAL CARE FIRST HOUR: CPT | Performed by: INTERNAL MEDICINE

## 2021-11-17 PROCEDURE — 71045 X-RAY EXAM CHEST 1 VIEW: CPT | Performed by: INTERNAL MEDICINE

## 2021-11-17 PROCEDURE — 99222 1ST HOSP IP/OBS MODERATE 55: CPT | Performed by: OTOLARYNGOLOGY

## 2021-11-17 RX ORDER — SCOLOPAMINE TRANSDERMAL SYSTEM 1 MG/1
1 PATCH, EXTENDED RELEASE TRANSDERMAL
Status: DISCONTINUED | OUTPATIENT
Start: 2021-11-17 | End: 2021-12-02

## 2021-11-17 RX ORDER — ALPRAZOLAM 0.25 MG/1
0.25 TABLET ORAL NIGHTLY PRN
Status: DISCONTINUED | OUTPATIENT
Start: 2021-11-17 | End: 2021-11-21

## 2021-11-17 NOTE — H&P
[facility]    History & Physical    Date:  11/16/2021   Date of Admission:  11/16/2021      Chief Complaint:   Nargis Rivera is a(n) 78year old female with stridor.     HPI:   The patient has a history of cervical disc disease and underwen surgery   • Problems with swallowing    • Sleep apnea     has c-pap.  not using presently   • Tremor    • Vitamin D insufficiency 3/6/2017     Past Surgical History:   Procedure Laterality Date   • BACK SURGERY  10/05/2020    C5-7 cervical fusion with Dr. Deion Haywood lethargic female with harsh and loud stridor  HEENT examination is unremarkable with pupils equal round and reactive to light and accommodation. Neck without adenopathy, thyromegaly, JVD nor bruit, but with harhe loud stridor.    Lungs clear to auscultati HOLA–consideration to resumption of CPAP in future    5. Nutrition–tube feedings    6. DVT prophylaxis–subcutaneous heparin    7. CODE STATUS–full as per discussion with son    I am delighted to assist with Quynh's care.     Greater than 35 minutes critic

## 2021-11-17 NOTE — SPIRITUAL CARE NOTE
responded to RN call.  consulted with RN that family was in the ED waiting room.  was escorted to waiting and introduced to Pt's sons Shaenll Johnson and Kendell Eason. RN provided an update to family stating that Pt is doing well.      Pt is a 78 year

## 2021-11-17 NOTE — PROGRESS NOTES
[facility] Dosing Service    Initial Pharmacokinetic Consult for Vancomycin AUC Dosing    Nupur Cheung is a 78year old patient who is being treated for  previously infected hardare . Pharmacy has been asked to dose vancomycin by Dr Godwin Washington.     Weights:

## 2021-11-17 NOTE — CM/SW NOTE
CARE COORDINATION TRANSFER NOTE:    Dr Romeo Alegria spoke with ENT at [facility]    Discussed plan of care, it was determined that patient needs tracheostomy, which can be performed here.  Dr Romeo Alegria will speak with family regarding latest discussion with [facility] team    [facility]

## 2021-11-17 NOTE — ED PROVIDER NOTES
Patient Seen in: [facility] AND [facility] Emergency Department      History   Patient presents with:  Difficulty Breathing    Stated Complaint: anxiety    Subjective:   HPI    78year old female with a past medical history of hypertension, HOLA, cervical spinal Conjunctiva/sclera: Conjunctivae normal.      Pupils: Pupils are equal, round, and reactive to light. Cardiovascular:      Rate and Rhythm: Regular rhythm. Tachycardia present. Heart sounds: Normal heart sounds. No murmur heard.       Pulmonary: Absolute Prelim 10.35 (*)     Neutrophil Absolute 10.35 (*)     All other components within normal limits   RAPID SARS-COV-2 BY PCR - Normal   CBC WITH DIFFERENTIAL WITH PLATELET    Narrative:      The following orders were created for panel order CBC With PM            Radiology exams  Viewed and reviewed by myself and findings discussed with patient including need for follow up    Critical Care:  I spent a total of 120 minutes of critical care time in obtaining history, performing a physical exam, bedside m consulted pulm, Dr Sen Adams, came to bedside to also eval pt. Agrees that pt needs to be intubated and son is now agreeable after d/w Dr Sen Adams. Pt intubated as below with anesthesia at bedside for back-up, no issues during intubation.      D/w Dr Tram Huff on pt

## 2021-11-17 NOTE — RESPIRATORY THERAPY NOTE
Pt here in with stridor. Epi administered. Aerosol therapy administered followed by vapotherm 20L/40%. Pt tolerated but no improvement. Abg drawn and results read back to Dr. Lincoln Quinones. Decision made to intubate pt. Pt on AC 12/420/+5/40%.  ETT 6.5 secure

## 2021-11-17 NOTE — PROGRESS NOTES
[facility]    Progress Note      Assessment and Plan:     1. 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   The patient has significant respira percussion. Cardiac regular rate and rhythm no murmur. Abdomen nontender, without hepatosplenomegaly and no mass appreciable. Extremities without clubbing cyanosis nor edema. Neurologic grossly intact with symmetric tone and strength and reflex.   Sk

## 2021-11-17 NOTE — CM/SW NOTE
Care Coordination [facility] Transfer Note:    Reason for transfer:   Continuity of care     Request initiated by:  Provider: Renata Benitez    Active Acute Medical issue:    Stridor and edema associated with previously infected cervical hardware result

## 2021-11-17 NOTE — DIETARY NOTE
ADULT NUTRITION INITIAL ASSESSMENT    Pt is at high nutrition risk. Pt meets severe malnutrition criteria.       CRITERIA FOR MALNUTRITION DIAGNOSIS:  Criteria for severe malnutrition diagnosis: chronic illness related to wt loss greater than 20% in 1 year progress slower to goal pending labs and tolerance. Diet hx per sons at bedside and pt (nodding or shaking head)  pt weight from 175# in June down to 155 lbs in Sept mostly d/t fluid losses. Further wt loss from 155 to now 134 lbs over past 2 mos.  Sons emp reviewed Low creat likely d/t low muscle mass. Obtain mg and phos level for am per EN  Order set.    Recent Labs     11/16/21  1628 11/17/21  0337   * 122*   BUN 11 12   CREATSERUM 0.53* 0.50*   CA 10.3* 9.0    142   K 3.9 4.2    107   CO Protein: 75-94  grams protein/day (1.2-1.5  grams protein per kg Actual body wt (ABW))  Fluid needs: 7064-3842 (25-30 ml/kg)     NUTRITION INTERVENTION:  - Diet: No orders of the defined types were placed in this encounter.   - Tube Feeding RX via OG tub

## 2021-11-17 NOTE — PLAN OF CARE
Problem: RESPIRATORY - ADULT  Goal: Achieves optimal ventilation and oxygenation  Description: INTERVENTIONS:  - Assess for changes in respiratory status  - Assess for changes in mentation and behavior  - Position to facilitate oxygenation and minimize r for standing, transferring and ambulating w/ or w/o assistive devices  - Assist with transfers and ambulation using safe patient handling equipment as needed  - Ensure adequate protection for wounds/incisions during mobilization  - Obtain PT/OT consults as

## 2021-11-17 NOTE — CM/SW NOTE
Received MDO to facilitate transfer to [facility]. , Latonia Flood working on transfer    / to remain available for support and/or discharge planning.      Norman Trejo MBA MSN, RN [facility] [place]

## 2021-11-17 NOTE — SEPSIS REASSESSMENT
[facility]    Sepsis Reassessment Note    /58 (BP Location: Left arm)   Pulse 90   Temp 96.8 °F (36 °C) (Temporal)   Resp 13   Ht 5' 4\" (1.626 m)   Wt 134 lb (60.8 kg)   SpO2 100%   BMI 23.00 kg/m²      I completed the sepsis reass

## 2021-11-17 NOTE — CM/SW NOTE
Emily spoke with [facility] transfer center and they stated they have no ICU  beds. She took the patients information and face sheet was faxed. She stated she would talk with the doctor and get back to me.  I informed her that this patient is a patient of theirs

## 2021-11-17 NOTE — CONSULTS
[facility]    Report of Consultation    Debra Crain Patient Status:  Inpatient    1940 MRN [id number]   Location [facility] 2W/SW Attending Víctor Valiente MD   Hosp Day # 1 PCP Maik Rod DO     Date of Admission:   cause of the sonorous breathing and recurrent episodes of respiratory distress.   Laryngoscopy performed by another ENT did suggest swollen arytenoid tissue overlying the laryngeal airway limiting the view of the larynx as well as soft tissue swelling about insufficiency 3/6/2017       Past Surgical History  Past Surgical History:   Procedure Laterality Date   • BACK SURGERY  10/05/2020    C5-7 cervical fusion with Dr. Elver Huntley @ [facility]   •      • CATARACT Bilateral     Dec 2018 (R), 2019 (L)   • TANNER mg, Intravenous, Q12H  Dexmedetomidine HCl in NaCl (PRECEDEX) 400 MCG/100ML premix infusion, 0.2-1.5 mcg/kg/hr, Intravenous, Continuous  Vancomycin HCl (VANCOCIN) 1,000 mg in sodium chloride 0.9% 250 mL IVPB-ADDV, 15 mg/kg, Intravenous, Q24H      amoxicill Nasopharynx Normal External nose - Normal. Lips/teeth/gums - Normal. Tonsils - Normal. Oropharynx - Normal.   Ears Normal Inspection - Right: Normal, Left: Normal. Canal - Right: Normal, Left: Normal. TM - Right: Normal, Left: Normal.   Skin Normal Inspe transmitted to the  [facility]) [address]) which includes the Dose Index Registry. FINDINGS:  CARDIAC: Extensive coronary artery atherosclerotic disease left main coronary and LAD.   Bulky mitral annular position. Nasogastric tube extends into the stomach. 5. Coronary artery atherosclerotic disease left main and LAD. 6. Bilateral adrenal masses have remained stable. Findings incompletely characterized may reflect adrenal hyperplasia versus adenomas.   Cho C5-C6-C7.   Subtle lucency surrounding the right sided pedicle screw at the C5 level unchanged from prior exam OTHER: Chronic contained extra luminal barium in the right parasagittal region prevertebral soft tissues extending of the superior mediastinum inocente medially. Otherwise no focal consolidation, pleural effusion or pneumothorax. BONES: The osseous structures are unchanged including postoperative changes of a lower cervical disc fusion. OTHER: An enteric tube again extends into the left upper quadrant. XR CHEST AP PORTABLE  (CPT=71045)    Result Date: 11/16/2021  PROCEDURE: XR CHEST AP PORTABLE  (CPT=71045) TIME: 1711 hours  COMPARISON: [facility], CT CHEST(CONTRAST ONLY) (CPT=71260), 6/21/2021, 0:32 AM.  [facility], X which may be obstructing her airway.   We did have a detailed conversation regarding the fact that the swallowing issues and the respiratory issues may be related in the fact that they both may be due to swelling that occurs from inflammation of the tissues

## 2021-11-17 NOTE — PROGRESS NOTES
Pt.received on following vent settings With ET tube 6.5. Pt.found with ET tube  secured at 25 @ the lips, RN aware. BS heard equally on both side. No any changes made at this time.  RT will continue to monitor the pt.     11/17/21 0026   Vent Information

## 2021-11-18 ENCOUNTER — APPOINTMENT (OUTPATIENT)
Dept: GENERAL RADIOLOGY | Facility: HOSPITAL | Age: 81
DRG: 003 | End: 2021-11-18
Attending: CLINICAL NURSE SPECIALIST
Payer: MEDICARE

## 2021-11-18 PROCEDURE — 99223 1ST HOSP IP/OBS HIGH 75: CPT | Performed by: SURGERY

## 2021-11-18 PROCEDURE — 99291 CRITICAL CARE FIRST HOUR: CPT | Performed by: INTERNAL MEDICINE

## 2021-11-18 PROCEDURE — 71045 X-RAY EXAM CHEST 1 VIEW: CPT | Performed by: CLINICAL NURSE SPECIALIST

## 2021-11-18 RX ORDER — ALPRAZOLAM 0.25 MG/1
0.5 TABLET ORAL 2 TIMES DAILY PRN
Status: DISCONTINUED | OUTPATIENT
Start: 2021-11-18 | End: 2021-11-21

## 2021-11-18 RX ORDER — ATROPINE SULFATE 10 MG/ML
2 SOLUTION/ DROPS OPHTHALMIC EVERY 2 HOUR PRN
Status: DISCONTINUED | OUTPATIENT
Start: 2021-11-18 | End: 2021-12-02

## 2021-11-18 NOTE — CONSULTS
[facility]    Report of Consultation    Edy Potts Patient Status:  Inpatient    1940 MRN [id number]   [facility] 2W/SW Attending Jia Hopson MD   Hosp Day # 2 PCP Sarita Nixon DO     Date of Admission:   complication    • Anxiety state, unspecified     with panic attacks   • Disorder of thyroid    • Esophageal reflux    • Essential hypertension    • Hearing impairment     bilateral hearing aids   • Hematuria 2011   • High blood pressure    • High cholester by mouth every 8 (eight) hours. Solifenacin Succinate 5 MG Oral Tab, Take 5 mg by mouth daily. predniSONE 20 MG Oral Tab, Take 20 mg by mouth daily.   ALPRAZOLAM 0.25 MG Oral Tab, TAKE 1 TABLET BY MOUTH ONCE DAILY AS NEEDED FOR ANXIETY  TRINTELLIX 10 MG O lesions  Neuro grossly intact, no focal deficits, no tremors  Back no deformity     Results:     Lab Results   Component Value Date    WBC 6.5 11/17/2021    HGB 12.6 11/17/2021    HCT 40.2 11/17/2021    .0 11/17/2021    CREATSERUM 0.68 11/18/2021 oropharynx/hypopharynx surrounding the ET tube and oral gastric tube. Similar finding noted previously.  4. Chronic contained extra luminal barium in the right parasagittal region near the thoracic inlet and superior mediastinum measures 4.8 x 1.1 cm. 5. S Tortuous atherosclerotic aorta. 2. Bibasilar atelectasis/scarring. Chronic aspirated barium in the retrocardiac region left base. 3. Residual extra luminal barium in the posterior mediastinum confirmed on prior chest CT, unchanged 4.  No acute pulmonary di

## 2021-11-18 NOTE — PLAN OF CARE
Problem: Patient Centered Care  Goal: Patient preferences are identified and integrated in the patient's plan of care  Description: Interventions:  - What would you like us to know as we care for you?  I have 2 sons  - Provide timely, complete, and accura needed  - Ensure adequate protection for wounds/incisions during mobilization  - Obtain PT/OT consults as needed  - Advance activity as appropriate  - Communicate ordered activity level and limitations with patient/family  Outcome: Progressing     Problem:

## 2021-11-18 NOTE — PROGRESS NOTES
[facility]    Progress Note      Assessment and Plan:     1. 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   The patient has significant respira bruit.   Lungs clear to auscultation and percussion. Cardiac regular rate and rhythm no murmur. Abdomen nontender, without hepatosplenomegaly and no mass appreciable. Extremities without clubbing cyanosis nor edema.    Neurologic grossly intact with sy

## 2021-11-18 NOTE — PROGRESS NOTES
Medical Note    Dr. Nikki Felty was able to discuss patient's condition and treatment options yesterday with son, Pranav Romo. Jadon asked for Dr. Nikki Felty to be involved in mom's care while at [facility] Dr. Sen Adams and Dr. Tram Huff management and consultation.

## 2021-11-18 NOTE — RESPIRATORY THERAPY NOTE
Pt received intubated A/C 12/420/40%+5,ETT 6.5 secured 25 cm.pPt suctioned as needed,saturating appropriately. No changes on PM shift.

## 2021-11-18 NOTE — PLAN OF CARE
Not acute events overnight, secretions improved, patient had a full nights rest. Will continue to monitor.        Problem: Patient Centered Care  Goal: Patient preferences are identified and integrated in the patient's plan of care  Description: Omero respiratory difficulty  - Respiratory Therapy support as indicated  - Manage/alleviate anxiety  - Monitor for signs/symptoms of CO2 retention  11/18/2021 0637 by Jovon Batista RN  Outcome: Progressing  11/18/2021 0600 by Jovon Batista RN  Outcome: Progress appropriate  - Communicate ordered activity level and limitations with patient/family  11/18/2021 0637 by Wes Leon RN  Outcome: Progressing  11/18/2021 0600 by Wes Leon RN  Outcome: Progressing

## 2021-11-18 NOTE — CM/SW NOTE
CARE COORDINATION TRANSFER NOTE:     Called to follow up on transfer request to [facility]    Transfer center stated case was closed, [facility] declined transfer request per Dr Mathis Current ENT    Brittany Bolaños, [address] [facility] , [address] [facility]

## 2021-11-18 NOTE — RESPIRATORY THERAPY NOTE
Received patient intubated/mechanically ventilated on full support. ETT 6.5mm secured at 25cm at the lip. Vent settings: AC12/420/+5/FIO2 weaned to 35%. Pt. Tolerating well. Suction provided as needed. No other vent changes made.

## 2021-11-18 NOTE — DIETARY NOTE
ADULT NUTRITION REASSESSMENT    Pt is at high nutrition risk. Pt meets severe malnutrition criteria.       CRITERIA FOR MALNUTRITION DIAGNOSIS:  Criteria for severe malnutrition diagnosis: chronic illness related to energy intake less than 75% for greater weight loss. Pt is at high risk for re-feeding syndrome thus will begin TF at low rate and progress slower to goal pending labs and tolerance.  Diet hx per sons at bedside and pt (nodding or shaking head)  pt weight from 175# in June down to 155 lbs in Sep Q8H   • MethylPREDNISolone Sodium Succ  40 mg Intravenous Q12H   • vancomycin  15 mg/kg Intravenous Q24H   PRn meds: ALPRAZolam, dextrose, ALPRAZolam    LABS: reviewed Low creat likely d/t low muscle mass.  Obtain mg and phos level for am to assure no refee mos) , moderate adipose tissue and muscle mass loss.    NUTRITION DIAGNOSIS PROGRESS:  Improvement (unresolved) on tube feeds advancing towards goal.       ESTIMATED NUTRITION NEEDS: Dosing wt: 63 kg (actual wt)   Calories: 8373-7624  calories/day (25-30 ca

## 2021-11-18 NOTE — PLAN OF CARE
Problem: Patient Centered Care  Goal: Patient preferences are identified and integrated in the patient's plan of care  Description: Interventions:  - What would you like us to know as we care for you?   - Provide timely, complete, and accurate informatio decreased cardiac output  - Evaluate effectiveness of vasoactive medications to optimize hemodynamic stability  - Monitor arterial and/or venous puncture sites for bleeding and/or hematoma  - Assess quality of pulses, skin color and temperature  - Assess f

## 2021-11-19 ENCOUNTER — APPOINTMENT (OUTPATIENT)
Dept: GENERAL RADIOLOGY | Facility: HOSPITAL | Age: 81
DRG: 003 | End: 2021-11-19
Attending: INTERNAL MEDICINE
Payer: MEDICARE

## 2021-11-19 DIAGNOSIS — Z43.0 TRACHEOSTOMY CARE (HCC): Primary | ICD-10-CM

## 2021-11-19 PROCEDURE — 71045 X-RAY EXAM CHEST 1 VIEW: CPT | Performed by: INTERNAL MEDICINE

## 2021-11-19 PROCEDURE — 99233 SBSQ HOSP IP/OBS HIGH 50: CPT | Performed by: INTERNAL MEDICINE

## 2021-11-19 PROCEDURE — 99233 SBSQ HOSP IP/OBS HIGH 50: CPT | Performed by: OTOLARYNGOLOGY

## 2021-11-19 RX ORDER — VANCOMYCIN HYDROCHLORIDE
1500 EVERY 24 HOURS
Status: DISCONTINUED | OUTPATIENT
Start: 2021-11-20 | End: 2021-11-24

## 2021-11-19 NOTE — CM/SW NOTE
11/19/21 1000   CM/SW Referral Data   Referral Source    Reason for Referral Discharge planning   Informant Patient; Son   Patient Info   Patient's Current Mental Status at Time of Assessment Alert;Oriented   Patient's [address]

## 2021-11-19 NOTE — PROGRESS NOTES
[facility]    Progress Note    Evens Gaviria Patient Status:  Inpatient    1940 MRN [id number]   Location [facility] 2W/SW Attending Miriam Watson MD   Hosp Day # 3 PCP Ej Augustine DO     Subjective:   Subjective: CHEST AP PORTABLE  (CPT=71045)    Result Date: 11/19/2021  CONCLUSION:  1. ET tube in the trachea at the level of the aortic knob about 1.7 cm above the pamela. This may need to be pulled back 1-2 cm for better positioning. Correlate clinically.  2. Other  Nutrition–tube feedings     6.  DVT prophylaxis–subcutaneous heparin     7.  CODE STATUS–full code       [address]  Baylee Estrada MD  11/19/2021

## 2021-11-19 NOTE — PLAN OF CARE
No acute events overnight, will continue to monitor. Problem: Patient Centered Care  Goal: Patient preferences are identified and integrated in the patient's plan of care  Description: Interventions:  - What would you like us to know as we care for you? Monitor for bleeding, hypotension and signs of decreased cardiac output  - Evaluate effectiveness of vasoactive medications to optimize hemodynamic stability  - Monitor arterial and/or venous puncture sites for bleeding and/or hematoma  - Assess quality of

## 2021-11-19 NOTE — PHYSICAL THERAPY NOTE
PHYSICAL THERAPY EVALUATION - INPATIENT     Room Number: 216/216-A  Evaluation Date: 11/19/2021  Type of Evaluation: Initial   Physician Order: See Comment for Specific Order (functional mobility screen)    Presenting Problem: stridor, pharyngeal and randal managed throughout pt mobility. SPO2 >96%, HR 96 bpm, BP R ankle 128/97 mmHg with activity. Once returned to supine and repositioned with HOB 45 deg, pt with sudden onset secretions and difficulty breathing.  RN arrived, connector to vent had dislodged, RN Hypothyroidism    • Lumbar spinal stenosis    • Obesity    • HOLA (obstructive sleep apnea) 5/3/17-PSG    AHI 38 RDI 61 REM AHI 54 Supine AHI 38 non-supine AHI 0 Sao2 Skip 65% autoPAP 6-16 HME   • Other and unspecified hyperlipidemia    • PONV (postoperati MOTION AND STRENGTH ASSESSMENT  Upper extremity ROM and strength are within functional limits   Lower extremity ROM is within functional limits   Lower extremity strength is within functional limits     BALANCE  Static Sitting: Fair -  Dynamic Sitting: [employer] assist)  Pattern: Shuffle (side stepping)  Stairs:  Other (comment) (N/A)    Bed Mobility: supine to sit tx at SBA              Sit to supine tx at MIN A, assist for BLE management    Transfers: x 2 sit to stand tx with hand hold assist at MIN A x 1-2    Ex

## 2021-11-19 NOTE — PROGRESS NOTES
Received PT intubated with a (6.5) ETT secured at (25) on vent with the following settings;   BS heard bilaterally, SXN provided as needed. No changes made, RT to continue to monitor.         11/19/21 1342   Vent Information   Vent Mode VC/AC   Settings   F

## 2021-11-19 NOTE — RESPIRATORY THERAPY NOTE
Patient received on the following vent settings: A/C 12,  ml, PEEP 5, FIO2 35%. Alarms set and within parameters. No suctioning needed at this time. Patient awake and alert.

## 2021-11-19 NOTE — OCCUPATIONAL THERAPY NOTE
OCCUPATIONAL THERAPY EVALUATION - INPATIENT     Room Number: 216/216-A  Evaluation Date: 11/19/2021  Type of Evaluation: Initial  Presenting Problem: biphasic stridor    Physician Order: IP Consult to Occupational Therapy  Reason for Therapy: ADL/IADL Dysf training;Functional transfer training;UE strengthening/ROM; Endurance training;Patient/Family education;Patient/Family training       OCCUPATIONAL THERAPY MEDICAL/SOCIAL HISTORY     Problem List  Principal Problem:    Biphasic stridor  Active Problems:    A in Home: 5-6 inside  Use of Assistive Device(s): RW; grab bars by toilet and in shower; shower chair    Prior Level of [place]: mod. I    SUBJECTIVE  Shakes head yes and no    OCCUPATIONAL THERAPY EXAMINATION      OBJECTIVE  Precautions:  Other (Commen transfer with mod.  I  Comment:     Patient will complete toileting with mod I  Comment:     Patient will tolerate standing for 3 minutes in prep for adls with mod I   Comment:    Patient will complete item retrieval with mod I  Comment:          Goals expi

## 2021-11-19 NOTE — PLAN OF CARE
Naz Fernandez reports being relatively comfortable on the ventilator off sedation - restarted home meds Trintellix and as needed xanax to assist with comfort. Copious oral secretions managed well by patient; as needed inline suction by nursing.  Assist with reposit for edema, trend weights  Outcome: Progressing     Problem: MUSCULOSKELETAL - ADULT  Goal: Return mobility to safest level of function  Description: INTERVENTIONS:  - Assess patient stability and activity tolerance for standing, transferring and ambulating

## 2021-11-19 NOTE — PROGRESS NOTES
[facility] - [facility]    Progress Note    Juan Goyal Patient Status:  Inpatient    1940 MRN [id number]   Location [facility] 2W/SW Attending Panda Jacques MD   Hosp Day # 3 PCP Geovanny Shultz DO       Subjective:   Juan Goyal etiology of her stridor and respiratory failure to be deliberated for longer term management.           Results:     Lab Results   Component Value Date    WBC 9.3 11/19/2021    HGB 12.7 11/19/2021    HCT 40.0 11/19/2021    .0 11/19/2021    CREATSERUM

## 2021-11-19 NOTE — PROGRESS NOTES
Pt.received on following vent setting with ET tube 6.5 secured at 25 @ the lips. All ventilator alarms functioning well. No any changes made at this time.      11/19/21 0019   Vent Information   Vent Mode VC/AC   Settings   FiO2 (%) 35 %   Resp Rate (Set)

## 2021-11-20 ENCOUNTER — APPOINTMENT (OUTPATIENT)
Dept: GENERAL RADIOLOGY | Facility: HOSPITAL | Age: 81
DRG: 003 | End: 2021-11-20
Attending: INTERNAL MEDICINE
Payer: MEDICARE

## 2021-11-20 PROCEDURE — 71045 X-RAY EXAM CHEST 1 VIEW: CPT | Performed by: INTERNAL MEDICINE

## 2021-11-20 PROCEDURE — 99233 SBSQ HOSP IP/OBS HIGH 50: CPT | Performed by: INTERNAL MEDICINE

## 2021-11-20 NOTE — RESPIRATORY THERAPY NOTE
Patient received intubated with ETT 6.5 secures 24 cms at the lips on vent settings noted AC 12/420/+5/35%. No vent changes made at this time. AXEL BS heard and patient suctioned as needed.  RT will continue to monitor pt

## 2021-11-20 NOTE — PROGRESS NOTES
[facility]    Progress Note      Assessment and Plan:     1. 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   The patient has significant respira 11/20/2021    .0 11/20/2021    CREATSERUM 0.56 11/20/2021    BUN 15 11/20/2021     11/20/2021    K 4.1 11/20/2021     11/20/2021    CO2 27.0 11/20/2021     11/20/2021    CA 9.1 11/20/2021     Chest x-ray–endotracheal tube in good

## 2021-11-20 NOTE — PROGRESS NOTES
Anil Montes is a 78year old female.   Patient presents with:  Difficulty Breathing      HISTORY OF PRESENT ILLNESS  Patient is a 78year old female who was admitted to the hospital for Biphasic stridor:  She presents to the ED on 11/16/2021 with worseni the glottis consistent with her history of inflammation surrounding the previously placed hardware.   Patient apparently has an appointment to meet with Dr. Wan Meza a laryngologist later on this month but unfortunately over the past several days decompensat Disorder Maternal Grandfather        Past Medical History:   Diagnosis Date   • Anesthesia complication    • Anxiety state, unspecified     with panic attacks   • Disorder of thyroid    • Esophageal reflux    • Essential hypertension    • Hearing impairmen Integumentary Negative Frequent skin infections, pigment change and rash. Hema/Lymph Negative Easy bleeding and easy bruising.            PHYSICAL EXAM    BP (!) 124/110 (BP Location: Right arm)   Pulse 81   Temp 97.9 °F (36.6 °C) (Temporal)   Resp 21 answered and she will need to have Heparin and tube feeds stopped the day before scheduled procedure. Scheduled for Monday morning of next week. 45 minutes sent with patient with greater than 50% of the time spent face to face.        This note was prepare

## 2021-11-20 NOTE — PLAN OF CARE
Patient changed from ventilator to t-piece - tolerating well. Copious amounts of oral secretion causes ET tubing to disconnect intermittently. Patient up in chair since 1300, tolerating movement and position change well.  Tube feeds at goal. Plan is still t INTERVENTIONS:  - Monitor vital signs, rhythm, and trends  - Monitor for bleeding, hypotension and signs of decreased cardiac output  - Evaluate effectiveness of vasoactive medications to optimize hemodynamic stability  - Monitor arterial and/or venous pun

## 2021-11-20 NOTE — PLAN OF CARE
Received patient on ventilator FiO2 35% and no sedation. Patient remains on those settings and is tolerating well. Patient has copious secretions and it causes the ETT to disconnect from the ventilator.  Saturations stable, and patient comfortable, patient Cessation handout, if applicable  - Encourage broncho-pulmonary hygiene including cough, deep breathe, Incentive Spirometry  - Assess the need for suctioning and perform as needed  - Assess and instruct to report SOB or any respiratory difficulty  - Respir with patient/family  Outcome: Progressing

## 2021-11-20 NOTE — PROGRESS NOTES
[facility] Dosing Service    Follow-up Pharmacokinetic Consult for Vancomycin AUC Dosing     Yari Esquivel is a 78year old patient who is being treated for  previously infected hardware .   Patient is on day 4 of vancomycin and is currently receiving 10

## 2021-11-20 NOTE — PROGRESS NOTES
[facility]    Progress Note    Leila Guthrieanselmo Patient Status:  Inpatient    1940 MRN [id number]   Location [facility] 2W/SW Attending Vinita Mireles MD   Hosp Day # 4 PCP Nick Holden DO       Subjective:   Leila Kay at 8:26 AM     Finalized by (CST): Wilder Chaidez MD on 11/20/2021 at 8:30 AM          XR CHEST AP PORTABLE  (CPT=71045)    Result Date: 11/19/2021  CONCLUSION:  1. ET tube in the trachea at the level of the aortic knob about 1.7 cm above the angelic

## 2021-11-20 NOTE — PLAN OF CARE
Patient remains calm and alert on ventilator. Pt stated anxiety in the AM - Xanax given. Pt denies pain. Plan for pt to receive trach on Monday and potentially PEG tube. Pt communicated through gestures and writing. Up at side of bed with PT today.  Sons at baseline  Description: INTERVENTIONS:  - Continuous cardiac monitoring, monitor vital signs, obtain 12 lead EKG if indicated  - Evaluate effectiveness of antiarrhythmic and heart rate control medications as ordered  - Initiate emergency measures for life t

## 2021-11-21 ENCOUNTER — APPOINTMENT (OUTPATIENT)
Dept: GENERAL RADIOLOGY | Facility: HOSPITAL | Age: 81
DRG: 003 | End: 2021-11-21
Attending: INTERNAL MEDICINE
Payer: MEDICARE

## 2021-11-21 PROCEDURE — 71045 X-RAY EXAM CHEST 1 VIEW: CPT | Performed by: INTERNAL MEDICINE

## 2021-11-21 PROCEDURE — 99233 SBSQ HOSP IP/OBS HIGH 50: CPT | Performed by: INTERNAL MEDICINE

## 2021-11-21 RX ORDER — LORAZEPAM 2 MG/ML
0.5 INJECTION INTRAMUSCULAR ONCE
Status: COMPLETED | OUTPATIENT
Start: 2021-11-21 | End: 2021-11-21

## 2021-11-21 RX ORDER — ALPRAZOLAM 0.25 MG/1
0.5 TABLET ORAL 2 TIMES DAILY PRN
Status: DISCONTINUED | OUTPATIENT
Start: 2021-11-21 | End: 2021-12-02

## 2021-11-21 RX ORDER — ALPRAZOLAM 0.25 MG/1
0.25 TABLET ORAL NIGHTLY PRN
Status: DISCONTINUED | OUTPATIENT
Start: 2021-11-21 | End: 2021-12-02

## 2021-11-21 RX ORDER — LORAZEPAM 2 MG/ML
0.25 INJECTION INTRAMUSCULAR EVERY 4 HOURS PRN
Status: DISCONTINUED | OUTPATIENT
Start: 2021-11-21 | End: 2021-11-22

## 2021-11-21 RX ORDER — METHYLPREDNISOLONE SODIUM SUCCINATE 40 MG/ML
20 INJECTION, POWDER, LYOPHILIZED, FOR SOLUTION INTRAMUSCULAR; INTRAVENOUS EVERY 24 HOURS
Status: DISCONTINUED | OUTPATIENT
Start: 2021-11-22 | End: 2021-11-28

## 2021-11-21 RX ORDER — LORAZEPAM 2 MG/ML
INJECTION INTRAMUSCULAR
Status: DISPENSED
Start: 2021-11-21 | End: 2021-11-21

## 2021-11-21 NOTE — PLAN OF CARE
Continues to be stable on ventilator; plan continues for tracheostomy tomorrow morning with PEG tube placement to follow (not sure if same day). Increased anxiety throughout the morning with large amount of secretions and significant rhonchi.  Patient is ab optimal cardiac output and hemodynamic stability  Description: INTERVENTIONS:  - Monitor vital signs, rhythm, and trends  - Monitor for bleeding, hypotension and signs of decreased cardiac output  - Evaluate effectiveness of vasoactive medications to optim broncho-pulmonary hygiene including cough, deep breathe, Incentive Spirometry  - Assess the need for suctioning and perform as needed  - Assess and instruct to report SOB or any respiratory difficulty  - Respiratory Therapy support as indicated  - Manage/a

## 2021-11-21 NOTE — PLAN OF CARE
Pt A&Ox4, following commands, was tolerating sitting in chair at beginning of shift; became very anxious and SOB with inline suctioning; needed to be put back on ventilator and brought back to bed, chest x-ray unchanged.  HR NSR-ST due to anxiety, BP slight Monitor electrolytes and administer replacement therapy as ordered  Outcome: Progressing     Problem: Patient/Family Goals  Goal: Patient/Family Long Term Goal  Description: Patient's Long Term Goal: to go home    Interventions:  - oxygen/ventilation manag Progressing

## 2021-11-21 NOTE — PROGRESS NOTES
[facility]    Progress Note    Gil Thao Patient Status:  Inpatient    1940 MRN [id number]   Location [facility] 2W/SW Attending Leah Ahn MD   Hosp Day # 5 PCP Dilia Clemens DO       Subjective:   Gil Thao 2.0 11/18/2021    PHOS 3.1 11/18/2021    TROP <0.017 07/27/2015    B12 >2,000 (H) 06/01/2020       XR CHEST AP/PA (1 VIEW) (CPT=71045)    Result Date: 11/21/2021  CONCLUSION:   Trace bilateral pleural effusions, slightly increased from prior.   Otherwise, n

## 2021-11-22 ENCOUNTER — ANESTHESIA (OUTPATIENT)
Dept: SURGERY | Facility: HOSPITAL | Age: 81
DRG: 003 | End: 2021-11-22
Payer: MEDICARE

## 2021-11-22 ENCOUNTER — ANESTHESIA EVENT (OUTPATIENT)
Dept: SURGERY | Facility: HOSPITAL | Age: 81
DRG: 003 | End: 2021-11-22
Payer: MEDICARE

## 2021-11-22 ENCOUNTER — APPOINTMENT (OUTPATIENT)
Dept: PICC SERVICES | Facility: HOSPITAL | Age: 81
DRG: 003 | End: 2021-11-22
Attending: INTERNAL MEDICINE
Payer: MEDICARE

## 2021-11-22 ENCOUNTER — APPOINTMENT (OUTPATIENT)
Dept: GENERAL RADIOLOGY | Facility: HOSPITAL | Age: 81
DRG: 003 | End: 2021-11-22
Attending: OTOLARYNGOLOGY
Payer: MEDICARE

## 2021-11-22 PROCEDURE — 31600 PLANNED TRACHEOSTOMY: CPT | Performed by: OTOLARYNGOLOGY

## 2021-11-22 PROCEDURE — 99221 1ST HOSP IP/OBS SF/LOW 40: CPT | Performed by: OTOLARYNGOLOGY

## 2021-11-22 PROCEDURE — 71045 X-RAY EXAM CHEST 1 VIEW: CPT | Performed by: OTOLARYNGOLOGY

## 2021-11-22 PROCEDURE — 0B110F4 BYPASS TRACHEA TO CUTANEOUS WITH TRACHEOSTOMY DEVICE, OPEN APPROACH: ICD-10-PCS | Performed by: OTOLARYNGOLOGY

## 2021-11-22 PROCEDURE — 99233 SBSQ HOSP IP/OBS HIGH 50: CPT | Performed by: INTERNAL MEDICINE

## 2021-11-22 RX ORDER — NALOXONE HYDROCHLORIDE 0.4 MG/ML
80 INJECTION, SOLUTION INTRAMUSCULAR; INTRAVENOUS; SUBCUTANEOUS AS NEEDED
Status: ACTIVE | OUTPATIENT
Start: 2021-11-22 | End: 2021-11-22

## 2021-11-22 RX ORDER — PHENYLEPHRINE HCL 10 MG/ML
VIAL (ML) INJECTION AS NEEDED
Status: DISCONTINUED | OUTPATIENT
Start: 2021-11-22 | End: 2021-11-22 | Stop reason: SURG

## 2021-11-22 RX ORDER — DEXAMETHASONE SODIUM PHOSPHATE 4 MG/ML
VIAL (ML) INJECTION AS NEEDED
Status: DISCONTINUED | OUTPATIENT
Start: 2021-11-22 | End: 2021-11-22 | Stop reason: SURG

## 2021-11-22 RX ORDER — LIDOCAINE HYDROCHLORIDE AND EPINEPHRINE 10; 10 MG/ML; UG/ML
INJECTION, SOLUTION INFILTRATION; PERINEURAL AS NEEDED
Status: DISCONTINUED | OUTPATIENT
Start: 2021-11-22 | End: 2021-11-22 | Stop reason: HOSPADM

## 2021-11-22 RX ORDER — MORPHINE SULFATE 4 MG/ML
4 INJECTION, SOLUTION INTRAMUSCULAR; INTRAVENOUS EVERY 10 MIN PRN
Status: ACTIVE | OUTPATIENT
Start: 2021-11-22 | End: 2021-11-22

## 2021-11-22 RX ORDER — LORAZEPAM 2 MG/ML
0.25 INJECTION INTRAMUSCULAR ONCE
Status: DISCONTINUED | OUTPATIENT
Start: 2021-11-22 | End: 2021-11-22

## 2021-11-22 RX ORDER — GLYCOPYRROLATE 0.2 MG/ML
INJECTION, SOLUTION INTRAMUSCULAR; INTRAVENOUS AS NEEDED
Status: DISCONTINUED | OUTPATIENT
Start: 2021-11-22 | End: 2021-11-22 | Stop reason: SURG

## 2021-11-22 RX ORDER — HYDROMORPHONE HYDROCHLORIDE 1 MG/ML
0.2 INJECTION, SOLUTION INTRAMUSCULAR; INTRAVENOUS; SUBCUTANEOUS EVERY 5 MIN PRN
Status: ACTIVE | OUTPATIENT
Start: 2021-11-22 | End: 2021-11-22

## 2021-11-22 RX ORDER — ONDANSETRON 2 MG/ML
4 INJECTION INTRAMUSCULAR; INTRAVENOUS ONCE AS NEEDED
Status: ACTIVE | OUTPATIENT
Start: 2021-11-22 | End: 2021-11-22

## 2021-11-22 RX ORDER — HYDROMORPHONE HYDROCHLORIDE 1 MG/ML
0.6 INJECTION, SOLUTION INTRAMUSCULAR; INTRAVENOUS; SUBCUTANEOUS EVERY 5 MIN PRN
Status: ACTIVE | OUTPATIENT
Start: 2021-11-22 | End: 2021-11-22

## 2021-11-22 RX ORDER — ROCURONIUM BROMIDE 10 MG/ML
INJECTION, SOLUTION INTRAVENOUS AS NEEDED
Status: DISCONTINUED | OUTPATIENT
Start: 2021-11-22 | End: 2021-11-22 | Stop reason: SURG

## 2021-11-22 RX ORDER — NEOSTIGMINE METHYLSULFATE 1 MG/ML
INJECTION INTRAVENOUS AS NEEDED
Status: DISCONTINUED | OUTPATIENT
Start: 2021-11-22 | End: 2021-11-22 | Stop reason: SURG

## 2021-11-22 RX ORDER — ONDANSETRON 2 MG/ML
INJECTION INTRAMUSCULAR; INTRAVENOUS AS NEEDED
Status: DISCONTINUED | OUTPATIENT
Start: 2021-11-22 | End: 2021-11-22 | Stop reason: SURG

## 2021-11-22 RX ORDER — PROCHLORPERAZINE EDISYLATE 5 MG/ML
5 INJECTION INTRAMUSCULAR; INTRAVENOUS ONCE AS NEEDED
Status: ACTIVE | OUTPATIENT
Start: 2021-11-22 | End: 2021-11-22

## 2021-11-22 RX ORDER — SODIUM CHLORIDE, SODIUM LACTATE, POTASSIUM CHLORIDE, CALCIUM CHLORIDE 600; 310; 30; 20 MG/100ML; MG/100ML; MG/100ML; MG/100ML
INJECTION, SOLUTION INTRAVENOUS CONTINUOUS
Status: DISCONTINUED | OUTPATIENT
Start: 2021-11-22 | End: 2021-11-23

## 2021-11-22 RX ORDER — HYDROMORPHONE HYDROCHLORIDE 1 MG/ML
0.4 INJECTION, SOLUTION INTRAMUSCULAR; INTRAVENOUS; SUBCUTANEOUS EVERY 5 MIN PRN
Status: DISPENSED | OUTPATIENT
Start: 2021-11-22 | End: 2021-11-22

## 2021-11-22 RX ORDER — LORAZEPAM 2 MG/ML
0.25 INJECTION INTRAMUSCULAR EVERY 4 HOURS PRN
Status: DISCONTINUED | OUTPATIENT
Start: 2021-11-22 | End: 2021-12-02

## 2021-11-22 RX ORDER — MORPHINE SULFATE 2 MG/ML
1 INJECTION, SOLUTION INTRAMUSCULAR; INTRAVENOUS EVERY 2 HOUR PRN
Status: DISCONTINUED | OUTPATIENT
Start: 2021-11-22 | End: 2021-12-02

## 2021-11-22 RX ORDER — MORPHINE SULFATE 4 MG/ML
2 INJECTION, SOLUTION INTRAMUSCULAR; INTRAVENOUS EVERY 10 MIN PRN
Status: ACTIVE | OUTPATIENT
Start: 2021-11-22 | End: 2021-11-22

## 2021-11-22 RX ORDER — HALOPERIDOL 5 MG/ML
0.25 INJECTION INTRAMUSCULAR ONCE AS NEEDED
Status: ACTIVE | OUTPATIENT
Start: 2021-11-22 | End: 2021-11-22

## 2021-11-22 RX ORDER — MORPHINE SULFATE 4 MG/ML
6 INJECTION, SOLUTION INTRAMUSCULAR; INTRAVENOUS EVERY 10 MIN PRN
Status: ACTIVE | OUTPATIENT
Start: 2021-11-22 | End: 2021-11-22

## 2021-11-22 RX ORDER — MORPHINE SULFATE 2 MG/ML
2 INJECTION, SOLUTION INTRAMUSCULAR; INTRAVENOUS EVERY 2 HOUR PRN
Status: DISCONTINUED | OUTPATIENT
Start: 2021-11-22 | End: 2021-12-02

## 2021-11-22 RX ADMIN — DEXAMETHASONE SODIUM PHOSPHATE 8 MG: 4 MG/ML VIAL (ML) INJECTION at 12:07:00

## 2021-11-22 RX ADMIN — ROCURONIUM BROMIDE 20 MG: 10 INJECTION, SOLUTION INTRAVENOUS at 12:05:00

## 2021-11-22 RX ADMIN — GLYCOPYRROLATE 0.4 MG: 0.2 INJECTION, SOLUTION INTRAMUSCULAR; INTRAVENOUS at 12:30:00

## 2021-11-22 RX ADMIN — NEOSTIGMINE METHYLSULFATE 3 MG: 1 INJECTION INTRAVENOUS at 12:30:00

## 2021-11-22 RX ADMIN — SODIUM CHLORIDE: 9 INJECTION, SOLUTION INTRAVENOUS at 11:59:00

## 2021-11-22 RX ADMIN — ONDANSETRON 4 MG: 2 INJECTION INTRAMUSCULAR; INTRAVENOUS at 12:07:00

## 2021-11-22 RX ADMIN — PHENYLEPHRINE HCL 100 MCG: 10 MG/ML VIAL (ML) INJECTION at 12:09:00

## 2021-11-22 NOTE — SPIRITUAL CARE NOTE
Pt is on the OR list for a tracheostomy,and insertion of a feeding tube. When the   arrived her sons were at her bedside. Sons requested prayer for their mother.  prayed as requested and sang for the Pt.

## 2021-11-22 NOTE — VASCULAR ACCESS
Vascular Access Consult Note  11/22/2021     Reviewed H&P and current condition. Past Medical History:  2011: Hematuria  3/6/2017: Vitamin D insufficiency  No date: Anesthesia complication  No date:  Anxiety state, unspecified      Comment:  with panic att 50 MG/ML oral solution 125 mg, 125 mg, Per G Tube, Daily  scopolamine (TRANSDERM-SCOP) patch, 1 patch, Transdermal, Q72H  [COMPLETED] racepinephrine HCl (S-2) nebulizer solution 0.5 mL, 0.5 mL, Nebulization, Once  [COMPLETED] LORazepam (ATIVAN) injection 0

## 2021-11-22 NOTE — ANESTHESIA POSTPROCEDURE EVALUATION
Patient: Vish Evans    Procedure Summary     Date: 11/22/21 Room / Location: [address] [place] [address] MAIN OR  / [address] [place] [address] MAIN OR    Anesthesia Start: 1733 Anesthesia Stop: 2413    Procedure: TRACHEOSTOMY (N/A Neck) Diagnosis:       Tracheostomy care ([facility] [address] [facility]      (Tracheostomy

## 2021-11-22 NOTE — PROGRESS NOTES
Medical Note    Follow up post operative trach placement. Patient is comfortable on vent via trach. No coughing, no pain. Patient is mouthing works to communicate. Son is at her bedside.     Vitals stable on monitor    Laryngeal swelling post hardware i

## 2021-11-22 NOTE — PROGRESS NOTES
[facility]    Progress Note      Assessment and Plan:     1. 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   The patient had significant respira tube in good position. Chronic barium aspiration.       Marline Sparrow MD  Medical Director, Critical Care, [facility]  Medical Director, [employer]  Pager: [phone]

## 2021-11-22 NOTE — ANESTHESIA PREPROCEDURE EVALUATION
Anesthesia PreOp Note    HPI:     David Phelps is a 78year old female who presents for preoperative consultation requested by: Jannie Irwin MD    Date of Surgery: 11/16/2021 - 11/22/2021    Procedure(s):  TRACHEOSTOMY  Indication: Tracheostomy care ( Date Noted: 03/13/2015      Essential hypertension         Date Noted: 02/10/2015      Chronic sinusitis         Date Noted: 02/10/2015      OAB (overactive bladder)         Date Noted: 06/17/2014      GERD (gastroesophageal reflux disease)         Date No for H pylori   • KNEE REPLACEMENT SURGERY  1/9/07    right   • OTHER SURGICAL HISTORY  11/30/15    laminectomy L3-L4, L4-5; right L1-L2 lateral microdiscectomy       amoxicillin clavulanate 250-125 MG Oral Tab, Take 250 mg by mouth every 8 (eight) hours. , Daily, Carmina Swenson MD  vancomycin IVPB premix 1.5g in 0.9% NaCl 250 mL, 1,500 mg, Intravenous, Q24H, Cindy Rodriguez MD, Last Rate: 166.7 mL/hr at 11/21/21 2022, 1,500 mg at 11/21/21 2022  atropine 1 % ophthalmic solution 2 drop, 2 drop, Oral, Q2H activity: Not on file    Other Topics      Concerns:        Not on file    Social History Narrative      Not on file    Social Determinants of Health  Financial Resource Strain: Not on file  Food Insecurity: Not on file  Transportation Needs: Not on file infection  PE comment: Intubated, mechanical  Cardiovascular - normal exam  (+) hypertension,     ECG reviewed    Neuro/Psych    (+)  anxiety/panic attacks,        GI/Hepatic/Renal    (+) GERD,     Endo/Other    (+) hypothyroidism,   Abdominal

## 2021-11-22 NOTE — PLAN OF CARE
No acute neuro changes, A&Ox4, follows commands. Was on CPAP mode, RR was 6-8, full support put back on at night to help increase rate. Ativan given x1 for anxiety. HR stable.  BP slightly hypotensive at night, MD made aware night before that this is a tren and perform as needed  - Assess and instruct to report SOB or any respiratory difficulty  - Respiratory Therapy support as indicated  - Manage/alleviate anxiety  - Monitor for signs/symptoms of CO2 retention  Outcome: Progressing     Problem: [place]

## 2021-11-22 NOTE — BRIEF OP NOTE
[facility]ative [facility]: [facility] [Z43[facility]0]     Post-Operative Diagnosis: Tracheostomy care [facility]) [Z43.0]      Procedure Performed:   TRACHEOSTOMY    Surgeon(s) and Role:     * Sanaz Nielsen MD - Primary     Valeriy Bruno MD       [facility]

## 2021-11-22 NOTE — DIETARY NOTE
ADULT NUTRITION REASSESSMENT    Pt is at high nutrition risk. Pt meets severe malnutrition criteria.       CRITERIA FOR MALNUTRITION DIAGNOSIS:  Criteria for severe malnutrition diagnosis: chronic illness related to energy intake less than 75% for greater high risk for re-feeding syndrome thus will begin TF at low rate and progress slower to goal pending labs and tolerance.  Diet hx per sons at bedside and pt (nodding or shaking head)  pt weight from 175# in June down to 155 lbs in Sept mostly d/t fluid loss mg Intravenous Q24H   • [MAR Hold] vortioxetine  10 mg Per NG Tube Daily   • vancomycin  1,500 mg Intravenous Q24H   • vancomycin  125 mg Per G Tube Daily   • [MAR Hold] scopolamine  1 patch Transdermal Q72H   • [Held by provider] Heparin Sodium (Porcine) (138 lb 14.2 oz)  06/23/21 : 84 kg (185 lb 3 oz)  06/09/21 : 81 kg (178 lb 9.2 oz)  04/30/21 : 76.4 kg (168 lb 8 oz)  03/27/21 : 88.4 kg (194 lb 14.2 oz)  03/16/21 : 85.7 kg (189 lb)  02/25/21 : 83.9 kg (185 lb)  12/04/20 : 94.8 kg (209 lb)  08/20/20 : 99. enteral nutrition, for enteral nutrition adjustment,    - Anthropometric Measurement:      Monitor weight  - Nutrition Goals:      halt true body cell mass loss, tube feed meets greater than 80% of needs, labs WNL , PEG for long-term nutrition needs.      D

## 2021-11-22 NOTE — OPERATIVE REPORT
[facility]    PATIENT'S NAME: Hayes Ross   ATTENDING PHYSICIAN: Diane Clakr MD   OPERATING PHYSICIAN:  ASSISTING PHYSICIAN: Leann Anders.  MD Dora Jameson MD   PATIENT ACCOUNT#:   [id number]    LOCATION:  [address] and capnometry was noted on the anesthesia unit. The wound was then packed with a small amount of Xeroform gauze followed by suturing of the tracheostomy plate to the underlying skin in the 4 quadrants using 2-0 silk suture.   A dressing was placed over th

## 2021-11-22 NOTE — PHYSICAL THERAPY NOTE
Therapy orders received, chart reviewed. Patient to OR this date for trach placement, will follow up post operatively for mobility assessment. Thank you.      Maikel Salvador, PT

## 2021-11-22 NOTE — OCCUPATIONAL THERAPY NOTE
Orders received and chart reviewed. Patient going for trach placement today. Will hold for today and follow up, as appropriate, after trach placement.     Annmarie Najjar, OTR/ELLIE Sheets  [phone].  [phone]

## 2021-11-23 ENCOUNTER — ANESTHESIA (OUTPATIENT)
Dept: SURGERY | Facility: HOSPITAL | Age: 81
DRG: 003 | End: 2021-11-23
Payer: MEDICARE

## 2021-11-23 ENCOUNTER — APPOINTMENT (OUTPATIENT)
Dept: GENERAL RADIOLOGY | Facility: HOSPITAL | Age: 81
DRG: 003 | End: 2021-11-23
Attending: CLINICAL NURSE SPECIALIST
Payer: MEDICARE

## 2021-11-23 PROCEDURE — 99233 SBSQ HOSP IP/OBS HIGH 50: CPT | Performed by: INTERNAL MEDICINE

## 2021-11-23 PROCEDURE — 43830 GSTRST OPEN WO CONSTJ TUBE: CPT | Performed by: SURGERY

## 2021-11-23 PROCEDURE — 0DH60UZ INSERTION OF FEEDING DEVICE INTO STOMACH, OPEN APPROACH: ICD-10-PCS | Performed by: SURGERY

## 2021-11-23 PROCEDURE — 71045 X-RAY EXAM CHEST 1 VIEW: CPT | Performed by: CLINICAL NURSE SPECIALIST

## 2021-11-23 RX ORDER — ROCURONIUM BROMIDE 10 MG/ML
INJECTION, SOLUTION INTRAVENOUS AS NEEDED
Status: DISCONTINUED | OUTPATIENT
Start: 2021-11-23 | End: 2021-11-23 | Stop reason: SURG

## 2021-11-23 RX ORDER — LORAZEPAM 2 MG/ML
0.5 INJECTION INTRAMUSCULAR EVERY 2 HOUR PRN
Status: DISCONTINUED | OUTPATIENT
Start: 2021-11-23 | End: 2021-12-02

## 2021-11-23 RX ORDER — HYDROMORPHONE HYDROCHLORIDE 1 MG/ML
0.2 INJECTION, SOLUTION INTRAMUSCULAR; INTRAVENOUS; SUBCUTANEOUS EVERY 5 MIN PRN
Status: DISCONTINUED | OUTPATIENT
Start: 2021-11-23 | End: 2021-11-24

## 2021-11-23 RX ORDER — MORPHINE SULFATE 10 MG/ML
6 INJECTION, SOLUTION INTRAMUSCULAR; INTRAVENOUS EVERY 10 MIN PRN
Status: DISCONTINUED | OUTPATIENT
Start: 2021-11-23 | End: 2021-11-24

## 2021-11-23 RX ORDER — ACETAMINOPHEN 650 MG/1
650 SUPPOSITORY RECTAL EVERY 6 HOURS PRN
Status: DISCONTINUED | OUTPATIENT
Start: 2021-11-23 | End: 2021-12-02

## 2021-11-23 RX ORDER — PROCHLORPERAZINE EDISYLATE 5 MG/ML
5 INJECTION INTRAMUSCULAR; INTRAVENOUS ONCE AS NEEDED
Status: ACTIVE | OUTPATIENT
Start: 2021-11-23 | End: 2021-11-23

## 2021-11-23 RX ORDER — POLYETHYLENE GLYCOL 3350 17 G/17G
17 POWDER, FOR SOLUTION ORAL DAILY PRN
Status: DISCONTINUED | OUTPATIENT
Start: 2021-11-23 | End: 2021-12-02

## 2021-11-23 RX ORDER — DEXTROSE AND SODIUM CHLORIDE 5; .45 G/100ML; G/100ML
INJECTION, SOLUTION INTRAVENOUS CONTINUOUS
Status: DISCONTINUED | OUTPATIENT
Start: 2021-11-23 | End: 2021-12-02

## 2021-11-23 RX ORDER — MORPHINE SULFATE 2 MG/ML
2 INJECTION, SOLUTION INTRAMUSCULAR; INTRAVENOUS EVERY 10 MIN PRN
Status: DISCONTINUED | OUTPATIENT
Start: 2021-11-23 | End: 2021-11-25 | Stop reason: ALTCHOICE

## 2021-11-23 RX ORDER — SODIUM PHOSPHATE, DIBASIC AND SODIUM PHOSPHATE, MONOBASIC 7; 19 G/133ML; G/133ML
1 ENEMA RECTAL ONCE AS NEEDED
Status: DISCONTINUED | OUTPATIENT
Start: 2021-11-23 | End: 2021-12-02

## 2021-11-23 RX ORDER — HYDROCODONE BITARTRATE AND ACETAMINOPHEN 5; 325 MG/1; MG/1
2 TABLET ORAL AS NEEDED
Status: DISCONTINUED | OUTPATIENT
Start: 2021-11-23 | End: 2021-11-24

## 2021-11-23 RX ORDER — BUPIVACAINE HYDROCHLORIDE AND EPINEPHRINE 2.5; 5 MG/ML; UG/ML
INJECTION, SOLUTION INFILTRATION; PERINEURAL AS NEEDED
Status: DISCONTINUED | OUTPATIENT
Start: 2021-11-23 | End: 2021-11-23 | Stop reason: HOSPADM

## 2021-11-23 RX ORDER — BISACODYL 10 MG
10 SUPPOSITORY, RECTAL RECTAL
Status: DISCONTINUED | OUTPATIENT
Start: 2021-11-23 | End: 2021-12-02

## 2021-11-23 RX ORDER — HYDROMORPHONE HYDROCHLORIDE 1 MG/ML
0.6 INJECTION, SOLUTION INTRAMUSCULAR; INTRAVENOUS; SUBCUTANEOUS EVERY 5 MIN PRN
Status: DISCONTINUED | OUTPATIENT
Start: 2021-11-23 | End: 2021-11-24

## 2021-11-23 RX ORDER — HYDROCODONE BITARTRATE AND ACETAMINOPHEN 5; 325 MG/1; MG/1
1 TABLET ORAL AS NEEDED
Status: DISCONTINUED | OUTPATIENT
Start: 2021-11-23 | End: 2021-11-24

## 2021-11-23 RX ORDER — MIDAZOLAM HYDROCHLORIDE 1 MG/ML
INJECTION INTRAMUSCULAR; INTRAVENOUS AS NEEDED
Status: DISCONTINUED | OUTPATIENT
Start: 2021-11-23 | End: 2021-11-23 | Stop reason: SURG

## 2021-11-23 RX ORDER — NALOXONE HYDROCHLORIDE 0.4 MG/ML
80 INJECTION, SOLUTION INTRAMUSCULAR; INTRAVENOUS; SUBCUTANEOUS AS NEEDED
Status: ACTIVE | OUTPATIENT
Start: 2021-11-23 | End: 2021-11-24

## 2021-11-23 RX ORDER — MORPHINE SULFATE 4 MG/ML
4 INJECTION, SOLUTION INTRAMUSCULAR; INTRAVENOUS EVERY 10 MIN PRN
Status: DISCONTINUED | OUTPATIENT
Start: 2021-11-23 | End: 2021-11-24

## 2021-11-23 RX ORDER — ONDANSETRON 2 MG/ML
4 INJECTION INTRAMUSCULAR; INTRAVENOUS ONCE AS NEEDED
Status: COMPLETED | OUTPATIENT
Start: 2021-11-23 | End: 2021-11-23

## 2021-11-23 RX ORDER — HYDROMORPHONE HYDROCHLORIDE 1 MG/ML
0.4 INJECTION, SOLUTION INTRAMUSCULAR; INTRAVENOUS; SUBCUTANEOUS EVERY 5 MIN PRN
Status: DISCONTINUED | OUTPATIENT
Start: 2021-11-23 | End: 2021-11-24

## 2021-11-23 RX ORDER — SODIUM CHLORIDE, SODIUM LACTATE, POTASSIUM CHLORIDE, CALCIUM CHLORIDE 600; 310; 30; 20 MG/100ML; MG/100ML; MG/100ML; MG/100ML
INJECTION, SOLUTION INTRAVENOUS CONTINUOUS
Status: DISCONTINUED | OUTPATIENT
Start: 2021-11-23 | End: 2021-11-29

## 2021-11-23 RX ORDER — ONDANSETRON 2 MG/ML
INJECTION INTRAMUSCULAR; INTRAVENOUS AS NEEDED
Status: DISCONTINUED | OUTPATIENT
Start: 2021-11-23 | End: 2021-11-23 | Stop reason: SURG

## 2021-11-23 RX ORDER — DEXTROSE, SODIUM CHLORIDE, AND POTASSIUM CHLORIDE 5; .45; .15 G/100ML; G/100ML; G/100ML
INJECTION INTRAVENOUS CONTINUOUS PRN
Status: DISCONTINUED | OUTPATIENT
Start: 2021-11-23 | End: 2021-11-23 | Stop reason: SURG

## 2021-11-23 RX ADMIN — ONDANSETRON 4 MG: 2 INJECTION INTRAMUSCULAR; INTRAVENOUS at 16:05:00

## 2021-11-23 RX ADMIN — DEXTROSE, SODIUM CHLORIDE, AND POTASSIUM CHLORIDE: 5; .45; .15 INJECTION INTRAVENOUS at 17:08:00

## 2021-11-23 RX ADMIN — DEXTROSE, SODIUM CHLORIDE, AND POTASSIUM CHLORIDE: 5; .45; .15 INJECTION INTRAVENOUS at 16:59:00

## 2021-11-23 RX ADMIN — MIDAZOLAM HYDROCHLORIDE 2 MG: 1 INJECTION INTRAMUSCULAR; INTRAVENOUS at 15:55:00

## 2021-11-23 RX ADMIN — ROCURONIUM BROMIDE 25 MG: 10 INJECTION, SOLUTION INTRAVENOUS at 16:24:00

## 2021-11-23 RX ADMIN — DEXTROSE, SODIUM CHLORIDE, AND POTASSIUM CHLORIDE: 5; .45; .15 INJECTION INTRAVENOUS at 15:50:00

## 2021-11-23 NOTE — PHYSICAL THERAPY NOTE
PHYSICAL THERAPY RE- EVALUATION - INPATIENT     Room Number: 216/216-A  Evaluation Date: 11/23/2021  Type of Evaluation: Re-evaluation   Physician Order: See Comment for Specific Order (functional mobility screen)    Presenting Problem: pharyngeal and lar aware. Patient's current functional deficits include decreased activity tolerance, BLE weakness, balance deficits, speech deficits, which are below the patient's pre-admission status.  The patient's Approx Degree of Impairment: 64.91% has been calculated 5/3/17-PSG    AHI 38 RDI 61 REM AHI 54 Supine AHI 38 non-supine AHI 0 Sao2 Skip 65% autoPAP 6-16 HME   • Other and unspecified hyperlipidemia    • PONV (postoperative nausea and vomiting)     severe nausea and ill feeling for weeks after surgery   • Probl extremity ROM and strength are within functional limits   Lower extremity ROM is within functional limits   Lower extremity strength is within functional limits     BALANCE  Static Sitting: Fair -  Dynamic Sitting: Fair -  Static Standing: Poor +  Dynamic ambulation    Patient End of Session: Up in chair;Call light within reach; Needs met;RN aware of session/findings; All patient questions and concerns addressed; With [facility] staff (all lines in place, RN present)    CURRENT GOALS    Goals to be met by: 12/7/21  Pa

## 2021-11-23 NOTE — CM/SW NOTE
MDO received for [facility] referral.  Pt still on ventilator    Referral sent via Aidin. Will provide list of accepting providers to patient when available. / to remain available for support and/or discharge planning.      Gerber Horne

## 2021-11-23 NOTE — PLAN OF CARE
Pt went for trach procedure today around 1200. Incision site has some bloody drainage and dressing was changed. Patient given pain medication PRN. Trach supplies at bedside.   Patient is having mild anxiety but is relieved through therapeutic communicatio Progressing  Goal: Absence of cardiac arrhythmias or at baseline  Description: INTERVENTIONS:  - Continuous cardiac monitoring, monitor vital signs, obtain 12 lead EKG if indicated  - Evaluate effectiveness of antiarrhythmic and heart rate control medicati

## 2021-11-23 NOTE — OCCUPATIONAL THERAPY NOTE
OCCUPATIONAL THERAPY EVALUATION - INPATIENT     Room Number: 216/216-A  Evaluation Date: 11/19/2021  Type of Evaluation: Re-evaluation  Presenting Problem: biphasic stridor    Physician Order: IP Consult to Occupational Therapy  Reason for Therapy: ADL/IAD RECOMMENDATIONS  OT Discharge Recommendations: Acute vs Day Rehab       PLAN  OT Treatment Plan: Balance activities; Energy conservation/work simplification techniques;ADL training;Functional transfer training; Endurance training;Patient/Family education; [place] Cuff lateral microdiscectomy       HOME SITUATION  Home Situation  Type of Home: House  Home Layout: One level  Lives With:  (2 sons)  Drives: No  Patient Regularly Uses: None    Stairs in Home: 5-6 inside  Use of Assistive Device(s): RW; grab bars by toilet an item retrieval with mod I  Comment:          Goals  on: 21  Frequency: 3-5x/wk      Misti Mccoy, OTR/L ext [phone]

## 2021-11-23 NOTE — ANESTHESIA PREPROCEDURE EVALUATION
Anesthesia PreOp Note    HPI:     Gil Thao is a 78year old female who presents for preoperative consultation requested by: Ainsley Lozada MD    Date of Surgery: 11/16/2021 - 11/23/2021    Procedure(s):  GASTROSTOMY TUBE INSERTION  Indication: la Date Noted: 03/13/2015      Essential hypertension         Date Noted: 02/10/2015      Chronic sinusitis         Date Noted: 02/10/2015      OAB (overactive bladder)         Date Noted: 06/17/2014      GERD (gastroesophageal reflux disease)         Date No for H pylori   • KNEE REPLACEMENT SURGERY  1/9/07    right   • OTHER SURGICAL HISTORY  11/30/15    laminectomy L3-L4, L4-5; right L1-L2 lateral microdiscectomy       amoxicillin clavulanate 250-125 MG Oral Tab, Take 250 mg by mouth every 8 (eight) hours. , ABBY , SOWMYAN  Fleet Enema (FLEET) 7-19 GM/118ML enema 133 mL, 1 enema, Rectal, Once PRN, Susannah Cameron APN  morphINE sulfate (PF) 2 MG/ML injection 1 mg, 1 mg, Intravenous, Q2H PRN, Reema Snowden MD, 1 mg at 11/22/21 2030  morphINE sulfate (PF) 2 M Runny nose  Skin Adhesives          RASH    Family History   Problem Relation Age of Onset   • Cancer Father         eustachian tube   • Cancer Mother         unknown primary   • Heart Disorder Maternal Grandmother    • Heart Disorder Maternal Grandfather her pulse is 68. Her respiration is 10 and oxygen saturation is 90%.     11/23/21  0800 11/23/21  0900 11/23/21  1000 11/23/21  1400   BP: 107/78 129/77 143/57 116/63   Pulse: 69 111 84 68   Resp: (!) 8 21 (!) 9 10   Temp: 98.5 °F (36.9 °C)      TempSrc: Te

## 2021-11-23 NOTE — PROGRESS NOTES
Medical Note:    Patient feels well without shortness of breath, pain. She feels constipated. Son present. Vitals stable  Remains on ventilator with trach access.   No cough    Acute respiratory failure - now trach in place  Unable to eat due to laryng

## 2021-11-23 NOTE — ANESTHESIA POSTPROCEDURE EVALUATION
Patient: Melba Benson    Procedure Summary     Date: 11/23/21 Room / Location: [facility] OR  / [facility] OR    Anesthesia Start: 5228 Anesthesia Stop:     Procedure: GASTROSTOMY TUBE INSERTION (N/A Abdomen) Diagnosis: (laryngeal edema)    Surgeons: Prashant Brown

## 2021-11-23 NOTE — BRIEF OP NOTE
Pre-Operative Diagnosis: laryngeal edema, history of esophageal perforation, tracheostomy, dysphagia     Post-Operative Diagnosis: same     Procedure Performed:   OPEN GASTROSTOMY TUBE INSERTION    Surgeon(s) and Role:     * Clarice May MD - Primary

## 2021-11-23 NOTE — PROGRESS NOTES
[facility]    Progress Note      Assessment and Plan:     1. 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     The patient underwent tracheostom 11/23/2021    GLU 97 11/23/2021    CA 9.3 11/23/2021     Chest x-ray–endotracheal tube in good position. Chronic barium aspiration.       Milli Juárez MD  Medical Director, Critical Care, [facility]  Medical Director, [facility]  Pager: [phone]

## 2021-11-24 ENCOUNTER — ANESTHESIA (OUTPATIENT)
Dept: SURGERY | Facility: HOSPITAL | Age: 81
DRG: 003 | End: 2021-11-24
Payer: MEDICARE

## 2021-11-24 ENCOUNTER — ANESTHESIA EVENT (OUTPATIENT)
Dept: SURGERY | Facility: HOSPITAL | Age: 81
DRG: 003 | End: 2021-11-24
Payer: MEDICARE

## 2021-11-24 PROCEDURE — 99233 SBSQ HOSP IP/OBS HIGH 50: CPT | Performed by: INTERNAL MEDICINE

## 2021-11-24 PROCEDURE — 0DTJ0ZZ RESECTION OF APPENDIX, OPEN APPROACH: ICD-10-PCS | Performed by: SURGERY

## 2021-11-24 PROCEDURE — 44950 APPENDECTOMY: CPT | Performed by: SURGERY

## 2021-11-24 PROCEDURE — 99231 SBSQ HOSP IP/OBS SF/LOW 25: CPT | Performed by: OTOLARYNGOLOGY

## 2021-11-24 RX ORDER — SODIUM CHLORIDE, SODIUM LACTATE, POTASSIUM CHLORIDE, CALCIUM CHLORIDE 600; 310; 30; 20 MG/100ML; MG/100ML; MG/100ML; MG/100ML
INJECTION, SOLUTION INTRAVENOUS CONTINUOUS PRN
Status: DISCONTINUED | OUTPATIENT
Start: 2021-11-24 | End: 2021-11-24 | Stop reason: SURG

## 2021-11-24 RX ORDER — SODIUM CHLORIDE, SODIUM LACTATE, POTASSIUM CHLORIDE, CALCIUM CHLORIDE 600; 310; 30; 20 MG/100ML; MG/100ML; MG/100ML; MG/100ML
INJECTION, SOLUTION INTRAVENOUS CONTINUOUS
Status: DISCONTINUED | OUTPATIENT
Start: 2021-11-24 | End: 2021-11-24

## 2021-11-24 RX ORDER — GLYCOPYRROLATE 0.2 MG/ML
INJECTION, SOLUTION INTRAMUSCULAR; INTRAVENOUS AS NEEDED
Status: DISCONTINUED | OUTPATIENT
Start: 2021-11-24 | End: 2021-11-24 | Stop reason: SURG

## 2021-11-24 RX ORDER — HALOPERIDOL 5 MG/ML
0.25 INJECTION INTRAMUSCULAR ONCE AS NEEDED
Status: ACTIVE | OUTPATIENT
Start: 2021-11-24 | End: 2021-11-24

## 2021-11-24 RX ORDER — ONDANSETRON 2 MG/ML
INJECTION INTRAMUSCULAR; INTRAVENOUS AS NEEDED
Status: DISCONTINUED | OUTPATIENT
Start: 2021-11-24 | End: 2021-11-24 | Stop reason: SURG

## 2021-11-24 RX ORDER — PROCHLORPERAZINE EDISYLATE 5 MG/ML
5 INJECTION INTRAMUSCULAR; INTRAVENOUS ONCE AS NEEDED
Status: DISCONTINUED | OUTPATIENT
Start: 2021-11-24 | End: 2021-11-24

## 2021-11-24 RX ORDER — CEFAZOLIN SODIUM/WATER 2 G/20 ML
2 SYRINGE (ML) INTRAVENOUS EVERY 12 HOURS
Status: DISCONTINUED | OUTPATIENT
Start: 2021-11-24 | End: 2021-11-24

## 2021-11-24 RX ORDER — NEOSTIGMINE METHYLSULFATE 1 MG/ML
INJECTION INTRAVENOUS AS NEEDED
Status: DISCONTINUED | OUTPATIENT
Start: 2021-11-24 | End: 2021-11-24 | Stop reason: SURG

## 2021-11-24 RX ORDER — MORPHINE SULFATE 4 MG/ML
4 INJECTION, SOLUTION INTRAMUSCULAR; INTRAVENOUS EVERY 10 MIN PRN
Status: DISCONTINUED | OUTPATIENT
Start: 2021-11-24 | End: 2021-11-25 | Stop reason: ALTCHOICE

## 2021-11-24 RX ORDER — HYDROMORPHONE HYDROCHLORIDE 1 MG/ML
0.2 INJECTION, SOLUTION INTRAMUSCULAR; INTRAVENOUS; SUBCUTANEOUS EVERY 5 MIN PRN
Status: DISCONTINUED | OUTPATIENT
Start: 2021-11-24 | End: 2021-11-24

## 2021-11-24 RX ORDER — ROCURONIUM BROMIDE 10 MG/ML
INJECTION, SOLUTION INTRAVENOUS AS NEEDED
Status: DISCONTINUED | OUTPATIENT
Start: 2021-11-24 | End: 2021-11-24 | Stop reason: SURG

## 2021-11-24 RX ORDER — HYDROCODONE BITARTRATE AND ACETAMINOPHEN 5; 325 MG/1; MG/1
1 TABLET ORAL AS NEEDED
Status: DISCONTINUED | OUTPATIENT
Start: 2021-11-24 | End: 2021-11-24

## 2021-11-24 RX ORDER — HYDROMORPHONE HYDROCHLORIDE 1 MG/ML
0.4 INJECTION, SOLUTION INTRAMUSCULAR; INTRAVENOUS; SUBCUTANEOUS EVERY 5 MIN PRN
Status: DISCONTINUED | OUTPATIENT
Start: 2021-11-24 | End: 2021-11-24

## 2021-11-24 RX ORDER — ONDANSETRON 2 MG/ML
4 INJECTION INTRAMUSCULAR; INTRAVENOUS ONCE AS NEEDED
Status: DISCONTINUED | OUTPATIENT
Start: 2021-11-24 | End: 2021-11-24

## 2021-11-24 RX ORDER — BUPIVACAINE HYDROCHLORIDE AND EPINEPHRINE 5; 5 MG/ML; UG/ML
INJECTION, SOLUTION PERINEURAL AS NEEDED
Status: DISCONTINUED | OUTPATIENT
Start: 2021-11-24 | End: 2021-11-24 | Stop reason: HOSPADM

## 2021-11-24 RX ORDER — HYDROMORPHONE HYDROCHLORIDE 1 MG/ML
0.6 INJECTION, SOLUTION INTRAMUSCULAR; INTRAVENOUS; SUBCUTANEOUS EVERY 5 MIN PRN
Status: DISCONTINUED | OUTPATIENT
Start: 2021-11-24 | End: 2021-11-24

## 2021-11-24 RX ORDER — HYDROCODONE BITARTRATE AND ACETAMINOPHEN 5; 325 MG/1; MG/1
2 TABLET ORAL AS NEEDED
Status: DISCONTINUED | OUTPATIENT
Start: 2021-11-24 | End: 2021-11-24

## 2021-11-24 RX ORDER — MORPHINE SULFATE 2 MG/ML
2 INJECTION, SOLUTION INTRAMUSCULAR; INTRAVENOUS EVERY 10 MIN PRN
Status: DISCONTINUED | OUTPATIENT
Start: 2021-11-24 | End: 2021-11-24

## 2021-11-24 RX ORDER — MORPHINE SULFATE 10 MG/ML
6 INJECTION, SOLUTION INTRAMUSCULAR; INTRAVENOUS EVERY 10 MIN PRN
Status: DISCONTINUED | OUTPATIENT
Start: 2021-11-24 | End: 2021-11-24

## 2021-11-24 RX ORDER — NALOXONE HYDROCHLORIDE 0.4 MG/ML
80 INJECTION, SOLUTION INTRAMUSCULAR; INTRAVENOUS; SUBCUTANEOUS AS NEEDED
Status: ACTIVE | OUTPATIENT
Start: 2021-11-24 | End: 2021-11-25

## 2021-11-24 RX ADMIN — ONDANSETRON 4 MG: 2 INJECTION INTRAMUSCULAR; INTRAVENOUS at 14:21:00

## 2021-11-24 RX ADMIN — ROCURONIUM BROMIDE 30 MG: 10 INJECTION, SOLUTION INTRAVENOUS at 13:52:00

## 2021-11-24 RX ADMIN — GLYCOPYRROLATE 0.6 MG: 0.2 INJECTION, SOLUTION INTRAMUSCULAR; INTRAVENOUS at 14:20:00

## 2021-11-24 RX ADMIN — SODIUM CHLORIDE, SODIUM LACTATE, POTASSIUM CHLORIDE, CALCIUM CHLORIDE: 600; 310; 30; 20 INJECTION, SOLUTION INTRAVENOUS at 13:39:00

## 2021-11-24 RX ADMIN — SODIUM CHLORIDE, SODIUM LACTATE, POTASSIUM CHLORIDE, CALCIUM CHLORIDE: 600; 310; 30; 20 INJECTION, SOLUTION INTRAVENOUS at 15:03:00

## 2021-11-24 RX ADMIN — NEOSTIGMINE METHYLSULFATE 3 MG: 1 INJECTION INTRAVENOUS at 14:20:00

## 2021-11-24 NOTE — CM/SW NOTE
Updates sent to [facility] and confirmed they will supply all TF supplies and formulas. Updates sent via Aidin. Pt remains on vent.   If unable to wean off vent, plan is for [facility]    / to remain available for s

## 2021-11-24 NOTE — PROGRESS NOTES
On assessment, buldge found below belly button. Extremely painful and rigid. Dr Xavier Marshall notified. Per verbal order, pressure dressing applied. Family bedside showing concern. Will continue to reassess    1845 pressure dressing in place.  Swelling looks a l

## 2021-11-24 NOTE — PROGRESS NOTES
Neftali Abrams is a 78year old female.   Patient presents with:  Difficulty Breathing      HISTORY OF PRESENT ILLNESS  Patient is a 78year old female who was admitted to the hospital for Biphasic stridor:  She presents to the ED on 11/16/2021 with worseni the glottis consistent with her history of inflammation surrounding the previously placed hardware.  Patient apparently has an appointment to meet with Dr. Brenden Uriarte a laryngologist later on this month but unfortunately over the past several days decompensat History   Problem Relation Age of Onset   • Cancer Father         eustachian tube   • Cancer Mother         unknown primary   • Heart Disorder Maternal Grandmother    • Heart Disorder Maternal Grandfather        Past Medical History:   Diagnosis Date   • A kg/m²        Constitutional Normal Overall appearance - Normal.   Psychiatric Normal Not examined   Neck Exam Normal Inspection - Normal. Palpation - Normal. Parotid gland - Normal. Thyroid gland - Normal. Trach site clean no blood no drainage. On vent.

## 2021-11-24 NOTE — ANESTHESIA POSTPROCEDURE EVALUATION
Patient: Bruce Jordan    Procedure Summary     Date: 11/24/21 Room / Location: [address] [place] [address] MAIN OR    Anesthesia Start: 9090 Anesthesia Stop: 0621    Procedure: WOUND EXPLORATION, EXPLORATORY LAPAROTOMY, APPENDECTOMY, PERITONEAL LAVAGE (N/A Abdo

## 2021-11-24 NOTE — PROGRESS NOTES
[facility]    Progress Note    Evaristo Fam Patient Status:  Inpatient    1940 MRN [id number]   Location [facility] 2W/SW Attending Yary Purvis MD   Hosp Day # 7 PCP Kristopher Otoole DO       Subjective:   Evaristo Fam <0.017 07/27/2015    B12 >2,000 (H) 06/01/2020       XR CHEST AP PORTABLE  (CPT=71045)    Result Date: 11/23/2021  CONCLUSION:  1. Borderline cardiomegaly. Tortuous aorta. 2. Chronic aspirated barium left lower lobe. Chronic basilar atelectasis/scarring.

## 2021-11-24 NOTE — PROGRESS NOTES
[facility]    Progress Note    Vish Sleepricky Patient Status:  Inpatient    1940 MRN [id number]   Location [facility] 2W/SW Attending Dayan Tang MD   Hosp Day # 8 PCP Saul Bauer DO       Subjective:   Vish Evans AST 12 (L) 11/17/2021    ALT 21 11/17/2021    PTT 19.1 (L) 11/16/2021    INR 1.08 11/16/2021    TSH 3.080 03/16/2021    ESRML 9 08/18/2021    CRP <0.29 08/18/2021    MG 2.0 11/18/2021    PHOS 3.1 11/18/2021    TROP <0.017 07/27/2015    B12 >2,000 (H) 06

## 2021-11-24 NOTE — PHYSICAL THERAPY NOTE
Chart reviewed, discussed case with OT who spoke to RN. Pt s/p PEG yesterday 11/24. Pt with severe pain overnight, possible return to OR. Will HOLD PT tx today and f/u in future as appropriate.     Mikhail Ortiz, DPT  Physical Therapy  [facility]

## 2021-11-24 NOTE — PROGRESS NOTES
[facility]    Progress Note      Assessment and Plan:     1. 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     The patient underwent tracheostom 0.92 11/24/2021    BUN 15 11/24/2021     11/24/2021    K 4.1 11/24/2021     11/24/2021    CO2 26.0 11/24/2021     11/24/2021    CA 9.0 11/24/2021     Chest x-ray–endotracheal tube in good position. Chronic barium aspiration.       Nicki Severin

## 2021-11-24 NOTE — OPERATIVE REPORT
Pre-Operative Diagnosis: Abdominal pain, leukocytosis     Post-Operative Diagnosis: Abdominal pain, leukocytosis     Procedure Performed:   WOUND EXPLORATION, EXPLORATORY LAPAROTOMY, APPENDECTOMY, PERITONEAL LAVAGE, LYSIS OF ADHESIONS.     Surgeon(s) and Ro The patient had been on antibiotics, so no additional prophylactic antibiotics were administered. The previous midline incision was incised the subcutaneous tissues were divided. The fascial closure was intact. There was no wound hematoma.   The fascial s

## 2021-11-24 NOTE — PLAN OF CARE
Pt with intense ABD pain this morning, PCA not helping. Surg. At bedside to address pain. Patient and MD agreed to go back to the OR for exploratory procedure. Appendectomy preformed, patient resting in bed. PCA pump still available. VSS.  Will continue to signs/symptoms of CO2 retention  Outcome: Progressing     Problem: CARDIOVASCULAR - ADULT  Goal: Maintains optimal cardiac output and hemodynamic stability  Description: INTERVENTIONS:  - Monitor vital signs, rhythm, and trends  - Monitor for bleeding, hyp

## 2021-11-24 NOTE — PROGRESS NOTES
[facility] Dosing Service  Antibiotic Dosing    Juan Goyal is a 78year old for whom pharmacy is dosing Ancef for treatment of   cervical hardware ppx . Allergies: is allergic to seasonal and skin adhesives.     Vitals: /65 (BP Location: Rig

## 2021-11-24 NOTE — RESPIRATORY THERAPY NOTE
Received patient on vent with listed parameters. Patient tolerating well,no changes made.   RT to monitor

## 2021-11-24 NOTE — BRIEF OP NOTE
Pre-Operative Diagnosis: Abdominal pain, leukocytosis     Post-Operative Diagnosis: Abdominal pain, leukocytosis     Procedure Performed:   WOUND EXPLORATION, EXPLORATORY LAPAROTOMY, APPENDECTOMY, PERITONEAL LAVAGE    Surgeon(s) and Role:     * Aster Real

## 2021-11-24 NOTE — ANESTHESIA PREPROCEDURE EVALUATION
Anesthesia PreOp Note    HPI:     Neftali Abrams is a 78year old female who presents for preoperative consultation requested by: Radha Cruz MD    Date of Surgery: 11/16/2021 - 11/24/2021    Procedure(s):  WOUND EXPLORATION POSSIBLE EXPLORATORY LAP Adrenal cortical adenoma         Date Noted: 03/13/2015      Essential hypertension         Date Noted: 02/10/2015      Chronic sinusitis         Date Noted: 02/10/2015      OAB (overactive bladder)         Date Noted: 06/17/2014      GERD (gastroesophagea with intestinal metplasia, neg for H pylori   • KNEE REPLACEMENT SURGERY  1/9/07    right   • OTHER SURGICAL HISTORY  11/30/15    laminectomy L3-L4, L4-5; right L1-L2 lateral microdiscectomy       amoxicillin clavulanate 250-125 MG Oral Tab, Take 250 mg by PRN, Broderick Mackenzie MD  magnesium hydroxide (MILK OF MAGNESIA) 400 MG/5ML suspension 30 mL, 30 mL, Oral, Daily PRN, Broderick Mackenzie MD  Fleet Enema (FLEET) 7-19 GM/118ML enema 133 mL, 1 enema, Rectal, Once PRN, Broderick Mackenzie MD  lactated ringer Intravenous, Q2H PRN, Larry Ingram MD, 2 mg at 11/23/21 2335  LORazepam (ATIVAN) injection 0.25 mg, 0.25 mg, Intravenous, Q4H PRN, Larry Ingram MD, 0.25 mg at 11/23/21 0209  methylPREDNISolone Sodium Succ (Solu-MEDROL) injection 20 mg, 20 mg, I Smokeless tobacco: Never Used    Vaping Use      Vaping Use: Never used    Substance and Sexual Activity      Alcohol use: No      Drug use: No      Sexual activity: Not on file    Other Topics      Concerns:        Not on file    Social History Narrative comment: Recent trach.    Cardiovascular - normal exam  (+) hypertension well controlled,     Neuro/Psych    (+)  anxiety/panic attacks,        GI/Hepatic/Renal    (+) GERD well controlled,     Endo/Other    (+) hypothyroidism,   Abdominal  - normal exam

## 2021-11-24 NOTE — PLAN OF CARE
Patient remains alert and oriented x4. Pt taken for PEG tube placement today (see notes for further information). Home health agency met with pt and her family today and plans to come back tomorrow for an update after PEG placement.  Pt up in chair today - antiarrhythmic and heart rate control medications as ordered  - Initiate emergency measures for life threatening arrhythmias  - Monitor electrolytes and administer replacement therapy as ordered  Outcome: Progressing     Problem: MUSCULOSKELETAL - ADULT  G

## 2021-11-24 NOTE — PROGRESS NOTES
Medical note:    Koki Cintron is an 78year old with history of laryngeal infection and swelling. Now on antibiotics and steroids. Patient under went gastrointestinal tube placement yesterday. .She is having pain at gtube site, left upper epigastric quad

## 2021-11-24 NOTE — OCCUPATIONAL THERAPY NOTE
Discussed OT follow up with RN- patient is in 10/10 pain on PCA pump overnight. Per Kenneth Jones RN pt may be evaluated to go back to OR- Overnight reports there is a \"bulge in her belly\" Will hold OT treatment today and continue to follow.    Naldo Lobato

## 2021-11-24 NOTE — PLAN OF CARE
Patient trach to vent. At the beginning of this shift pt c/o pain in her abdomen, Pt had received fentanyl and morphine at 1800 and was not due for pain meds.  This RN tried to reposition patient as best as possible, but pt was complaining of pain with any balance, assess for edema, trend weights  Outcome: Progressing  Goal: Absence of cardiac arrhythmias or at baseline  Description: INTERVENTIONS:  - Continuous cardiac monitoring, monitor vital signs, obtain 12 lead EKG if indicated  - Evaluate effectivenes

## 2021-11-24 NOTE — RESPIRATORY THERAPY NOTE
Received patient with tracheostomy to vent on full support. AC10/400/35%/+5. Suction provided as needed. No changes made. RT will continue to monitor.

## 2021-11-25 PROBLEM — E86.0 DEHYDRATION: Status: ACTIVE | Noted: 2021-11-25

## 2021-11-25 PROBLEM — D62 ANEMIA DUE TO BLOOD LOSS, ACUTE: Status: ACTIVE | Noted: 2021-11-25

## 2021-11-25 PROBLEM — Z90.49 S/P APPENDECTOMY: Status: ACTIVE | Noted: 2021-11-25

## 2021-11-25 PROCEDURE — 99233 SBSQ HOSP IP/OBS HIGH 50: CPT | Performed by: INTERNAL MEDICINE

## 2021-11-25 PROCEDURE — 99024 POSTOP FOLLOW-UP VISIT: CPT | Performed by: SURGERY

## 2021-11-25 PROCEDURE — 30233N1 TRANSFUSION OF NONAUTOLOGOUS RED BLOOD CELLS INTO PERIPHERAL VEIN, PERCUTANEOUS APPROACH: ICD-10-PCS | Performed by: FAMILY MEDICINE

## 2021-11-25 RX ORDER — SODIUM CHLORIDE 9 MG/ML
INJECTION, SOLUTION INTRAVENOUS ONCE
Status: COMPLETED | OUTPATIENT
Start: 2021-11-25 | End: 2021-11-25

## 2021-11-25 RX ORDER — ACETAMINOPHEN 325 MG/1
650 TABLET ORAL EVERY 6 HOURS PRN
Status: DISCONTINUED | OUTPATIENT
Start: 2021-11-25 | End: 2021-11-29

## 2021-11-25 NOTE — PROGRESS NOTES
[facility]    Progress Note    David Phelps Patient Status:  Inpatient    1940 MRN [id number]   Location [facility] 2W/SW Attending Yevgeniy Vasquez MD   Hosp Day # 9 PCP Boby Sanchez DO     Assessment and Plan:     POD 1 and 0.45 % NaCl with KCl 20 mEq infusion) 300 -- --    Volume (mL) (lactated ringers infusion) -- 600 --    IV PIGGYBACK  --  100  --    Volume (mL) (Piperacillin Sod-Tazobactam So (ZOSYN) 3.375 g in dextrose 5% 100 mL IVPB-ADDV) -- 100 --    Total Intake

## 2021-11-25 NOTE — PROGRESS NOTES
[facility] HOSP - [facility]    Progress Note    Juan Goyal Patient Status:  Inpatient    1940 MRN [id number]   Location [facility] 2W/SW Attending Panda Jacques MD   Hosp Day # 9 PCP Geovanny Shultz DO       Subjective:   Juan Goyal .0 (L) 11/25/2021    CREATSERUM 1.71 (H) 11/25/2021    BUN 25 (H) 11/25/2021     11/25/2021    K 4.5 11/25/2021     11/25/2021    CO2 23.0 11/25/2021    GLU 99 11/25/2021    CA 8.2 (L) 11/25/2021    ALB 1.7 (L) 11/25/2021    ALKPHO 52

## 2021-11-25 NOTE — PROGRESS NOTES
Patient with low urine output overnight, mack flushed with 30cc of sterile water with no resolution, MD aware, orders to give 1L  NS bolus.

## 2021-11-25 NOTE — PLAN OF CARE
Patient's hemoglobin resulted 6.4 from 0732 lab draw. Dr Betito Blanton informed of critical lab, 1 unit PRBC ordered to be transfused, blood consent obtained, and transfusion initiated.  After transfusion started, received call from hematology that the CBC had been home    Interventions:  - See additional Care Plan goals for specific interventions  Outcome: Progressing  Goal: Patient/Family Short Term Goal  Description: Patient's Short Term Goal: spend more time up to the chair and transition off the vent    Interven medications as ordered  - Initiate emergency measures for life threatening arrhythmias  - Monitor electrolytes and administer replacement therapy as ordered  Outcome: Progressing     Problem: MUSCULOSKELETAL - ADULT  Goal: Return mobility to safest level o

## 2021-11-25 NOTE — PROGRESS NOTES
[facility]    Progress Note      Assessment and Plan:     1. 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     The patient underwent tracheostom Extremities without clubbing cyanosis nor edema. Neurologic grossly intact with symmetric tone and strength and reflex. Skin without gross abnormality     Results:        Chest x-ray–endotracheal tube in good position. Chronic barium aspiration.

## 2021-11-25 NOTE — PLAN OF CARE
Patient alert, resting comfortably in bed. VSS. Afebrile. No acute changes overnight.    Problem: RESPIRATORY - ADULT  Goal: Achieves optimal ventilation and oxygenation  Description: INTERVENTIONS:  - Assess for changes in respiratory status  - Assess for function  Description: INTERVENTIONS:  - Assess patient stability and activity tolerance for standing, transferring and ambulating w/ or w/o assistive devices  - Assist with transfers and ambulation using safe patient handling equipment as needed  - Ensure

## 2021-11-25 NOTE — SPIRITUAL CARE NOTE
Patient was not responding during chaplains visit. Patient has been hospitalized for 9 days due to increased anxiety. Patient was recently prescribed prednisone for swelling around the vocal cords.  Since then, patient has been afraid to sleep, af

## 2021-11-25 NOTE — PROGRESS NOTES
Patient was alert but not responding during chaplains visit. Family at bedside. Patient hospitalized for ? Rae Rivera Family requested anointing of sick with mother's permission.  completed paperwork for [facility] anointing.    will visit as ne

## 2021-11-26 ENCOUNTER — APPOINTMENT (OUTPATIENT)
Dept: GENERAL RADIOLOGY | Facility: HOSPITAL | Age: 81
DRG: 003 | End: 2021-11-26
Attending: INTERNAL MEDICINE
Payer: MEDICARE

## 2021-11-26 PROCEDURE — 99232 SBSQ HOSP IP/OBS MODERATE 35: CPT | Performed by: INTERNAL MEDICINE

## 2021-11-26 PROCEDURE — 99024 POSTOP FOLLOW-UP VISIT: CPT | Performed by: SURGERY

## 2021-11-26 PROCEDURE — 71045 X-RAY EXAM CHEST 1 VIEW: CPT | Performed by: INTERNAL MEDICINE

## 2021-11-26 RX ORDER — DEXTROSE MONOHYDRATE 25 G/50ML
50 INJECTION, SOLUTION INTRAVENOUS
Status: DISCONTINUED | OUTPATIENT
Start: 2021-11-26 | End: 2021-12-02

## 2021-11-26 NOTE — PROGRESS NOTES
[facility]     Progress Note        Stevie Burden Patient Status:  Inpatient    1940 MRN [id number]   Location [facility] 2W/SW Attending Sarina Hightower MD   Hosp Day # 10 PCP Randi Vila DO       Subjective:   Patient s infusion, , Intravenous, Continuous  morphine 1mg/mL in NS 30 mL PCA syringe, , Intravenous, Continuous  acetaminophen (Tylenol) rectal suppository 650 mg, 650 mg, Rectal, Q6H PRN  LORazepam (ATIVAN) injection 0.5 mg, 0.5 mg, Intravenous, Q2H PRN  morphINE  11/26/2021    CO2 23.0 11/26/2021     11/26/2021    CA 8.8 11/26/2021       XR CHEST AP PORTABLE  (CPT=71045)    Result Date: 11/26/2021  CONCLUSION:  1. Little change from November 23, 2021. 2. Minimal scarring/atelectasis.  3. Tracheostomy t

## 2021-11-26 NOTE — CM/SW NOTE
CM followed up with pts POA and Son John Torre regarding discharge planning and LTACH choice.   John Torre states that he is hopeful for [facility], but understands that a back up plan is needed and will review the list.      SANTINO sent LTACH lis

## 2021-11-26 NOTE — DIETARY NOTE
ADULT NUTRITION REASSESSMENT    Pt is at high nutrition risk. Pt meets severe malnutrition criteria.       CRITERIA FOR MALNUTRITION DIAGNOSIS:  Criteria for severe malnutrition diagnosis: chronic illness related to energy intake less than 75% for greater high risk for re-feeding syndrome thus will begin TF at low rate and progress slower to goal pending labs and tolerance.  Diet hx per sons at bedside and pt (nodding or shaking head)  pt weight from 175# in June down to 155 lbs in Sept mostly d/t fluid loss recently BRIANDA. RN notified re: need for bowel regime. MEDICATIONS: reviewed LR @ 100 ml/hr.    • Dextrose-NaCl 70 mL/hr at 11/24/21 1513   • lactated ringers 100 mL/hr at 11/26/21 0914   • morphine 1mg/mL in NS 30 mL     • dextrose 1,000 mL (11/23/21 00 in past 5 mos: severe wt loss.    WEIGHT HISTORY:  Patient Weight(s) for the past 336 hrs:   Weight   11/24/21 1513 66 kg (145 lb 8.1 oz)   11/17/21 0900 63 kg (138 lb 14.2 oz)   11/16/21 1616 60.8 kg (134 lb)     Wt Readings from Last 10 Encounters:  11/24 or provider: monitor plans pt from home with family.     MONITOR AND EVALUATE/NUTRITION GOALS:  - Food and Nutrient Administration:      Monitor: tolerance to enteral nutrition, adequacy of enteral nutrition, for enteral nutrition adjustment,    - Anthropom

## 2021-11-26 NOTE — PROGRESS NOTES
[facility]    Progress Note    Sid Escobar Patient Status:  Inpatient    1940 MRN [id number]   Location [facility] 2W/SW Attending Abigail Gallegos MD   Hosp Day # 10 PCP Jerrod Eason DO       Subjective:   Sid Escobar 11/26/2021     (H) 11/26/2021    CA 8.8 11/26/2021    ALB 1.7 (L) 11/25/2021    ALKPHO 52 (L) 11/17/2021    BILT 1.1 11/17/2021    TP 5.1 (L) 11/17/2021    AST 12 (L) 11/17/2021    ALT 21 11/17/2021    PTT 19.1 (L) 11/16/2021    INR 1.08 11/16/2021

## 2021-11-26 NOTE — PLAN OF CARE
Problem: Patient Centered Care  Goal: Patient preferences are identified and integrated in the patient's plan of care  Description: Interventions:  - What would you like us to know as we care for you?   - Provide timely, complete, and accurate informatio bleeding, hypotension and signs of decreased cardiac output  - Evaluate effectiveness of vasoactive medications to optimize hemodynamic stability  - Monitor arterial and/or venous puncture sites for bleeding and/or hematoma  - Assess quality of pulses, ski sucitoning, pt becomes anxious when suctioning per trach. On cpap trach vent since 1015 am call light usage reviewed, call do not fall, pt has been up to chair with pt since 1010 am, she likes sitting up in chair, repositioned, son vimal tejada at bedside.  Emo

## 2021-11-26 NOTE — OCCUPATIONAL THERAPY NOTE
OCCUPATIONAL THERAPY TREATMENT NOTE - INPATIENT        Room Number: 533/454-Q           Presenting Problem: biphasic stridor    Problem List  Principal Problem:    Biphasic stridor  Active Problems:    GERD (gastroesophageal reflux disease)    Hypothyroidi an 8.\"    OBJECTIVE  Precautions:  (IV; g-tube; trach to vent; catheter)    WEIGHT BEARING RESTRICTION                PAIN ASSESSMENT  Ratin  Location: abdomen        ACTIVITY TOLERANCE                         O2 SATURATIONS  Oxygen Therapy  SPO2% on 3-5x/wk    Maksim Core   Occupational Therapist  [facility]

## 2021-11-26 NOTE — PHYSICAL THERAPY NOTE
PHYSICAL THERAPY TREATMENT NOTE - INPATIENT     Room Number: 721/646-O       Presenting Problem: pharyngeal and laryngeal edema related to cervical hardware infection, s/p tracheostomy 11/22       Problem List  Principal Problem:    Biphasic stridor  Activ trach to vent; catheter)    WEIGHT BEARING RESTRICTION      no restrictions           PAIN ASSESSMENT   Ratin  Location: neck and facial pain  Management Techniques: Repositioning;Relaxation; Body mechanics    BALANCE  Static Sitting: Fair -  Dynamic Si Sit to/from Stand at assistance level: supervision with walker - rolling      Goal #2  Current Status  min A   Goal #3 Patient is able to ambulate 100 feet with assist device: walker - rolling at assistance level: supervision ( pending ability to wean off

## 2021-11-26 NOTE — PROGRESS NOTES
PT is trached with a (Portex size 8) on vent with the following settings;  BS heard bilaterally, SXN provided as needed. Returned Pt back to full support at [facility]. RT to continue to monitor.         11/26/21 2211   Vent Information   Vent Mode VC/AC   [employer]

## 2021-11-26 NOTE — PROGRESS NOTES
[facility]    Progress Note    Stevie Burden Patient Status:  Inpatient    1940 MRN [id number]   Location [facility] 2W/SW Attending Sarina Hightower MD   Hosp Day # 10 PCP Randi Vila DO     Assessment and Plan:     POD 2 Given volume (ml) 21 9 3    Volume (mL) (dextrose 5 %-0.45 % NaCl infusion) 1106 -- --    Volume (mL) (lactated ringers infusion) 600 -- --    Blood  --  95  --    Volume (Transfuse RBC) -- 95 --    NG/GT  --  1260  60    Tube Feeding Flushes (mL) -- 515 6 Aimee Barton MD  11/26/2021

## 2021-11-26 NOTE — PLAN OF CARE
No acute neuro changes, pt A&Ox4, follows commands. On ventilator, switched to full support due to respiratory distress, xanax given x1. VSS. Adequate urine output. No Bms. Pt stood and pivoted back to bed from chair. Bed locked and in lowest position.  Rishabh Monitor for signs/symptoms of CO2 retention  Outcome: Progressing     Problem: CARDIOVASCULAR - ADULT  Goal: Maintains optimal cardiac output and hemodynamic stability  Description: INTERVENTIONS:  - Monitor vital signs, rhythm, and trends  - Monitor for b

## 2021-11-27 PROCEDURE — 99232 SBSQ HOSP IP/OBS MODERATE 35: CPT | Performed by: INTERNAL MEDICINE

## 2021-11-27 PROCEDURE — 99231 SBSQ HOSP IP/OBS SF/LOW 25: CPT | Performed by: OTOLARYNGOLOGY

## 2021-11-27 RX ORDER — GLYCOPYRROLATE 0.2 MG/ML
0.2 INJECTION, SOLUTION INTRAMUSCULAR; INTRAVENOUS EVERY 6 HOURS PRN
Status: DISCONTINUED | OUTPATIENT
Start: 2021-11-27 | End: 2021-12-02

## 2021-11-27 NOTE — RESPIRATORY THERAPY NOTE
Patient received on vent-a/c 10,400,35% and peep 5.  0825: Patient placed on cpap 5/ps 10,-370 ML,RR 15-18.  0845 ps changed to 8 cwp.    1150 am: trach care done,inner cannula changed.

## 2021-11-27 NOTE — PROGRESS NOTES
[facility]    Progress Note    Robyn Doe Patient Status:  Inpatient    1940 MRN [id number]   Location [facility] 2W/SW Attending Nayla Price MD   [facility] Day # 11 PCP Jose Roberto Larsen DO       Subjective:   Robyn Doe 11/27/2021     11/27/2021    CO2 25.0 11/27/2021     (H) 11/27/2021    CA 8.5 11/27/2021    ALB 1.7 (L) 11/25/2021    ALKPHO 52 (L) 11/17/2021    BILT 1.1 11/17/2021    TP 5.1 (L) 11/17/2021    AST 12 (L) 11/17/2021    ALT 21 11/17/2021    PTT

## 2021-11-27 NOTE — PLAN OF CARE
No acute neuro changes, a&ox4, follows commands. Vent settings remain the same, on full support, tolerating well. Adequate urine output. No Bms. Bed locked and in lowest position. Will continue to monitor.     Problem: Patient Centered Care  Goal: Patient p CARDIOVASCULAR - ADULT  Goal: Maintains optimal cardiac output and hemodynamic stability  Description: INTERVENTIONS:  - Monitor vital signs, rhythm, and trends  - Monitor for bleeding, hypotension and signs of decreased cardiac output  - Evaluate effectiv

## 2021-11-27 NOTE — PROGRESS NOTES
Mihir Del Castillo is a 78year old female.   Patient presents with:  Difficulty Breathing      HISTORY OF PRESENT ILLNESS  Patient is a 78year old female who was admitted to the hospital for Biphasic stridor:  She presents to the ED on 11/16/2021 with worseni the glottis consistent with her history of inflammation surrounding the previously placed hardware.  Patient apparently has an appointment to meet with Dr. Carmel Bergeron a laryngologist later on this month but unfortunately over the past several days decompensat Never Smoker      Smokeless tobacco: Never Used    Vaping Use      Vaping Use: Never used    Substance and Sexual Activity      Alcohol use: No      Drug use: No      Family History   Problem Relation Age of Onset   • Cancer Father         eustachian tube PHYSICAL EXAM    BP (!) 118/97 (BP Location: Right arm)   Pulse 73   Temp 97.7 °F (36.5 °C) (Temporal)   Resp 26   Ht 5' 4\" (1.626 m)   Wt 145 lb 8.1 oz (66 kg)   SpO2 96%   BMI 24.98 kg/m²        Constitutional Normal Overall appeara software. As a result errors may occur. When identified these errors have been corrected. While every attempt is made to correct errors during dictation discrepancies may still exist    Juan Jesus MD    11/27/2021    4:04 PM

## 2021-11-27 NOTE — PROGRESS NOTES
[facility]     Progress Note        Yari Esquivel Patient Status:  Inpatient    1940 MRN [id number]   Location [facility] 2W/SW Attending Georganne Meckel, MD   Hosp Day # 11 PCP Laura Marti DO       Subjective:   Patient s Continuous  morphine 1mg/mL in NS 30 mL PCA syringe, , Intravenous, Continuous  acetaminophen (Tylenol) rectal suppository 650 mg, 650 mg, Rectal, Q6H PRN  LORazepam (ATIVAN) injection 0.5 mg, 0.5 mg, Intravenous, Q2H PRN  morphINE sulfate (PF) 2 MG/ML inj 25.0 11/27/2021     11/27/2021    CA 8.5 11/27/2021       XR CHEST AP PORTABLE  (CPT=71045)    Result Date: 11/26/2021  CONCLUSION:  1. Little change from November 23, 2021. 2. Minimal scarring/atelectasis. 3. Tracheostomy tube.     Dictated by (CST)

## 2021-11-27 NOTE — RESPIRATORY THERAPY NOTE
Pt received on vent settings CPAP/PS 10/5/35%. Pt switched to full support overnight. Pt on current vent settings VC/10/400/+5/35% ; Trach Portex #8  Suctioned pt as needed throughout the night. Pt tolerating and saturating appropriately.   No acute blake

## 2021-11-28 PROCEDURE — 99232 SBSQ HOSP IP/OBS MODERATE 35: CPT | Performed by: OTOLARYNGOLOGY

## 2021-11-28 PROCEDURE — 99232 SBSQ HOSP IP/OBS MODERATE 35: CPT | Performed by: INTERNAL MEDICINE

## 2021-11-28 NOTE — PHYSICAL THERAPY NOTE
PHYSICAL THERAPY TREATMENT NOTE - INPATIENT     Room Number: 657/930-M       Presenting Problem: pharyngeal and laryngeal edema related to cervical hardware infection, s/p tracheostomy 11/22       Problem List  Principal Problem:    Biphasic stridor  Activ repositioning of lines and pt required. Discussed additional chair pad with pt and pt's son, both in agreement to trial it.  Retrieved chair pad, ROMA Rivers available to help with sit<>stand and placement of chair pad as rehab tech busy with another therapist. mechanics    BALANCE  Static Sitting: Fair -  Dynamic Sitting: Fair -  Static Standing: Poor +  Dynamic Standing: Poor +    ACTIVITY TOLERANCE  Pulse: 86  Heart Rate Source: Monitor                    O2 WALK  Oxygen Therapy  SPO2% on Oxygen at Rest: 100 is able to demonstrate transfers Sit to/from [facility] assistance level: supervision with walker - rolling      Goal #2  Current Status  modAx2   Goal #3 Patient is able to ambulate 100 feet with assist device: walker - rolling at assistance level: supervis

## 2021-11-28 NOTE — RESPIRATORY THERAPY NOTE
Received pt on vent with listed parameters of CPAP/PS +5/8/35%. Pt tolerating well,RR27/MV7.8//NIF-44/RSBI64. Pt placed on 40% trach collar, tolerating well no c/o sob. Pt suctioned as needed, o2 trach collar decreased to 35%. RT to monitor.

## 2021-11-28 NOTE — RESPIRATORY THERAPY NOTE
PT with trach size portex #8 received on the vent with settings A/C/ RR 10/ / PEEP 5 / FIO2 35%.  Sx done and continue to monitor the PT

## 2021-11-28 NOTE — PROGRESS NOTES
[facility]     Progress Note        Edy Potts Patient Status:  Inpatient    1940 MRN [id number]   Location [facility] 2W/SW Attending Jia Hopson MD   Hosp Day # 12 PCP Sarita Nixon DO       Subjective:   Patient s g, Oral, Daily PRN  magnesium hydroxide (MILK OF MAGNESIA) 400 MG/5ML suspension 30 mL, 30 mL, Oral, Daily PRN  Fleet Enema (FLEET) 7-19 GM/118ML enema 133 mL, 1 enema, Rectal, Once PRN  lactated ringers infusion, , Intravenous, Continuous  morphine 1mg/mL 11/28/2021    .0 11/28/2021    CREATSERUM 1.61 11/28/2021    BUN 36 11/28/2021     11/28/2021    K 4.9 11/28/2021     11/28/2021    CO2 29.0 11/28/2021     11/28/2021    CA 9.1 11/28/2021       No results found.           Assessmen

## 2021-11-28 NOTE — PLAN OF CARE
Patient received on vent CPAP settings and tolerating well. Patient switched to A/C mode per RT around 2130. Patient became increasingly more restless, CPAP switched back at 0100, patient resting remainder of shift.  Patient received [place]a for increasing handout, if applicable  - Encourage broncho-pulmonary hygiene including cough, deep breathe, Incentive Spirometry  - Assess the need for suctioning and perform as needed  - Assess and instruct to report SOB or any respiratory difficulty  - Respiratory Ther patient/family  Outcome: Progressing     Problem: Diabetes/Glucose Control  Goal: Glucose maintained within prescribed range  Description: INTERVENTIONS:  - Monitor Blood Glucose as ordered  - Assess for signs and symptoms of hyperglycemia and hypoglycemia

## 2021-11-28 NOTE — PROGRESS NOTES
Yari Esquivel is a 78year old female.   Patient presents with:  Difficulty Breathing      HISTORY OF PRESENT ILLNESS  Patient is a 78year old female who was admitted to the hospital for Biphasic stridor:  She presents to the ED on 11/16/2021 with worseni the glottis consistent with her history of inflammation surrounding the previously placed hardware.  Patient apparently has an appointment to meet with Dr. Jareth Horn a laryngologist later on this month but unfortunately over the past several days decompensat Doing well this morning sitting up in a chair without any complaints or concerns. Son is in the room and has many questions regarding future management of her tracheostomy with respect to voicing swallowing and also has questions about future placement. COLONOSCOPY  6/3/14    colon polyp and EGD done too.    • COLONOSCOPY  08/06/2019   • EGD  08/06/2019    gastric polyp removed (benign)   • EGD  09/02/2020    chronic inactive gastritis with intestinal metplasia, neg for H pylori   • KNEE REPLACEMENT SURGER medications on file. ASSESSMENT AND PLAN    Ventilatory dependence with respiratory failure    Postop day #6 status post tracheostomy. Doing very well. Switch to A/C mode overnight did not tolerate and replaced on CPAP.   Son is in the room this morning

## 2021-11-28 NOTE — PROGRESS NOTES
[facility]    Progress Note    Nargis Rivera Patient Status:  Inpatient    1940 MRN [id number]   Location [facility] 2W/SW Attending Wing Maria Teresa MD   Hosp Day # 12 PCP Chris Rivera DO       Subjective:   Nargis Rivera 11.6 (L) 11/28/2021    HCT 36.9 11/28/2021    .0 11/28/2021    CREATSERUM 1.61 (H) 11/28/2021    BUN 36 (H) 11/28/2021     11/28/2021    K 4.9 11/28/2021     11/28/2021    CO2 29.0 11/28/2021     (H) 11/28/2021    CA 9.1 11/28/202

## 2021-11-28 NOTE — PLAN OF CARE
Pt vitals stable throughout shift. Up to chair for 4 hours, mack remains in place, tube feeding continued. Safety measures implemented throughout shift. 2    Problem: Patient Centered Care  Goal: Patient preferences are identified and integrated in the pa Assess for signs of decreased coronary artery perfusion - ex.  Angina  - Evaluate fluid balance, assess for edema, trend weights  Outcome: Progressing  Goal: Absence of cardiac arrhythmias or at baseline  Description: INTERVENTIONS:  - Continuous cardiac mo

## 2021-11-28 NOTE — RESPIRATORY THERAPY NOTE
Pt received trach to vent cpap 5/ps 8/ FIO2 35%. 20:00 pt placed back to full support ,pt suctioned as needed. Pt saturating appropriately.

## 2021-11-28 NOTE — PLAN OF CARE
Pt remains resting comfortably in chair. Pt is A. Ox4, able to follow all commands. Pt mouths words, nods/gestures appropriately. Pt put on trach-collar in AM, tolerating well. No c/o SOB, suction PRN. Remains in NSR/VSS.  Tube feeds at goal, tolerating well supplementation based on oxygen saturation or ABGs  - Provide Smoking Cessation handout, if applicable  - Encourage broncho-pulmonary hygiene including cough, deep breathe, Incentive Spirometry  - Assess the need for suctioning and perform as needed  - Ass appropriate  - Communicate ordered activity level and limitations with patient/family  Outcome: Progressing     Problem: Diabetes/Glucose Control  Goal: Glucose maintained within prescribed range  Description: INTERVENTIONS:  - Monitor Blood Glucose as ord

## 2021-11-28 NOTE — PROGRESS NOTES
Pt is familiar to this . Pt's son Azar Mercedes present and attentive at bedside. Pt communicates but is non-verbal, mouths the words. Provided empathic presence, active listening, and prayer.     11/28/21 0908   Clinical Encounter Type   Visited With Lorri Sandhoff

## 2021-11-29 PROCEDURE — 99233 SBSQ HOSP IP/OBS HIGH 50: CPT | Performed by: INTERNAL MEDICINE

## 2021-11-29 RX ORDER — FUROSEMIDE 10 MG/ML
40 INJECTION INTRAMUSCULAR; INTRAVENOUS ONCE
Status: COMPLETED | OUTPATIENT
Start: 2021-11-29 | End: 2021-11-29

## 2021-11-29 RX ORDER — ACETAMINOPHEN 160 MG/5ML
650 SOLUTION ORAL EVERY 6 HOURS PRN
Status: DISCONTINUED | OUTPATIENT
Start: 2021-11-29 | End: 2021-12-02

## 2021-11-29 NOTE — CM/SW NOTE
11/29  Received call from Jakub from [facility] [phone] requesting updates and updated discharge date. In multidisciplinary rounds, was notified that patient likely ready for discharge Wednesday 12/1/2021.  Pt weaned from vent yesterda

## 2021-11-29 NOTE — PHYSICAL THERAPY NOTE
PHYSICAL THERAPY TREATMENT NOTE - INPATIENT     Room Number: 452/550-P       Presenting Problem: pharyngeal and laryngeal edema related to cervical hardware infection, s/p tracheostomy 11/22       Problem List  Principal Problem:    Biphasic stridor  Activ RECOMMENDATIONS  PT Discharge Recommendations: Home with home health PT/OT;24 hour care/supervision;Acute rehabilitation (pt prefers home)     PLAN  PT Treatment Plan: Bed mobility; Energy conservation;Patient education; Family education;Gait training;Streng walker  Pattern: Shuffle  Stairs: (NT)    Additional information: Pt's son did not stay in the room for OT/PT session today. Patient End of Session: Up in chair;Needs met;Call light within reach;RN aware of session/findings; All patient ques

## 2021-11-29 NOTE — OCCUPATIONAL THERAPY NOTE
OCCUPATIONAL THERAPY TREATMENT NOTE - INPATIENT        Room Number: 448/650-O           Presenting Problem: biphasic stridor    Problem List  Principal Problem:    Biphasic stridor  Active Problems:    GERD (gastroesophageal reflux disease)    Hypothyroidi presented     OBJECTIVE  Precautions:  (IV; g-tube; trach to vent; catheter)            PAIN ASSESSMENT  Ratin  Location: abdomen        ACTIVITIES OF DAILY LIVING ASSESSMENT  AM-PAC ‘6-Clicks’ Inpatient Daily Activity Short Form  How much help from an

## 2021-11-29 NOTE — PROGRESS NOTES
[facility]    Progress Note      Assessment and Plan:      1.  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  She is s/p tracheostomy on 11/22 an cyanosis nor edema; distal pulses 2+  Neurologic grossly intact with symmetric tone, strength and reflex; appropriate mood and affect     Results:        [facility]  Progressive Critical Care Unit

## 2021-11-29 NOTE — PLAN OF CARE
Patient Aox4 and follows commands - communicates through mouthing words, gesturing and writing. Pt up in chair and on trach collar. Trach collar o2% weaned down - pt tolerating well. Morphine gtt d/c'd per MD and liquid tylenol ordered PRN for pain.  Pt am hemodynamic stability  - Monitor arterial and/or venous puncture sites for bleeding and/or hematoma  - Assess quality of pulses, skin color and temperature  - Assess for signs of decreased coronary artery perfusion - ex.  Angina  - Evaluate fluid balance, a

## 2021-11-29 NOTE — PLAN OF CARE
Received in chair, able to pivot back to bed with 2 assist. Remained on trach collar overnight with no issue. Clearing secretions with productive cough. Robinul given x1 with improvement. Deep suctioned trach per family request x1 with minimal output.  Hallie Initiate emergency measures for life threatening arrhythmias  - Monitor electrolytes and administer replacement therapy as ordered  Outcome: Progressing     Problem: MUSCULOSKELETAL - ADULT  Goal: Return mobility to safest level of function  Description: I

## 2021-11-29 NOTE — PROGRESS NOTES
[place] [facility] - [facility]    Ortho Medical Progress Note     Melba Benson Patient Status:  Inpatient    1940 MRN [id number]   Location [facility] 2W/SW Attending Born, [address] Day # 15 PCP Catia Ch DO       Subjective:   Denisse Morales 11/28/2021    CREATSERUM 1.61 (H) 11/28/2021    BUN 36 (H) 11/28/2021     11/28/2021    K 4.9 11/28/2021     11/28/2021    CO2 29.0 11/28/2021     (H) 11/28/2021    CA 9.1 11/28/2021    ALB 1.7 (L) 11/25/2021    ALKPHO 52 (L) 11/17/2021

## 2021-11-30 ENCOUNTER — APPOINTMENT (OUTPATIENT)
Dept: GENERAL RADIOLOGY | Facility: HOSPITAL | Age: 81
DRG: 003 | End: 2021-11-30
Attending: INTERNAL MEDICINE
Payer: MEDICARE

## 2021-11-30 PROCEDURE — 99233 SBSQ HOSP IP/OBS HIGH 50: CPT | Performed by: INTERNAL MEDICINE

## 2021-11-30 PROCEDURE — 71045 X-RAY EXAM CHEST 1 VIEW: CPT | Performed by: INTERNAL MEDICINE

## 2021-11-30 RX ORDER — ALPRAZOLAM 0.5 MG/1
0.5 TABLET ORAL 2 TIMES DAILY PRN
Qty: 45 TABLET | Refills: 1 | Status: SHIPPED | OUTPATIENT
Start: 2021-11-30

## 2021-11-30 RX ORDER — AMOXICILLIN 250 MG/5ML
500 POWDER, FOR SUSPENSION ORAL EVERY 12 HOURS SCHEDULED
Status: DISCONTINUED | OUTPATIENT
Start: 2021-11-30 | End: 2021-12-01

## 2021-11-30 RX ORDER — LEVOTHYROXINE SODIUM 0.03 MG/1
25 TABLET ORAL
Qty: 30 TABLET | Refills: 3 | Status: SHIPPED | OUTPATIENT
Start: 2021-11-30

## 2021-11-30 RX ORDER — LEVOTHYROXINE SODIUM 0.03 MG/1
25 TABLET ORAL
Status: DISCONTINUED | OUTPATIENT
Start: 2021-11-30 | End: 2021-12-02

## 2021-11-30 RX ORDER — SODIUM PHOSPHATE, DIBASIC AND SODIUM PHOSPHATE, MONOBASIC 7; 19 G/133ML; G/133ML
1 ENEMA RECTAL
Refills: 0 | Status: SHIPPED | COMMUNITY
Start: 2021-11-30

## 2021-11-30 RX ORDER — POLYETHYLENE GLYCOL 3350 17 G/17G
17 POWDER, FOR SOLUTION ORAL DAILY PRN
Refills: 0 | Status: SHIPPED | COMMUNITY
Start: 2021-11-30

## 2021-11-30 RX ORDER — AMOXICILLIN 250 MG/5ML
500 POWDER, FOR SUSPENSION ORAL EVERY 12 HOURS SCHEDULED
Qty: 300 ML | Refills: 2 | Status: SHIPPED | OUTPATIENT
Start: 2021-11-30 | End: 2021-12-02

## 2021-11-30 RX ORDER — LEVOTHYROXINE SODIUM 0.03 MG/1
25 TABLET ORAL
Status: DISCONTINUED | OUTPATIENT
Start: 2021-11-30 | End: 2021-11-30

## 2021-11-30 RX ORDER — ACETAMINOPHEN 160 MG/5ML
650 SOLUTION ORAL EVERY 6 HOURS PRN
Refills: 0 | Status: SHIPPED | COMMUNITY
Start: 2021-11-30

## 2021-11-30 RX ORDER — FUROSEMIDE 10 MG/ML
40 INJECTION INTRAMUSCULAR; INTRAVENOUS ONCE
Status: COMPLETED | OUTPATIENT
Start: 2021-11-30 | End: 2021-11-30

## 2021-11-30 NOTE — PROGRESS NOTES
[facility]    Ortho Medical Progress Note     Reagan Kumar Patient Status:  Inpatient    1940 MRN [id number]   [facility] 2W/SW Attending Born, [address] Day # 14 PCP Samson Bauer DO       Subjective:   Jason Ferrell 11/28/2021    ALB 1.7 (L) 11/25/2021    ALKPHO 52 (L) 11/17/2021    BILT 1.1 11/17/2021    TP 5.1 (L) 11/17/2021    AST 12 (L) 11/17/2021    ALT 21 11/17/2021    PTT 19.1 (L) 11/16/2021    INR 1.08 11/16/2021    T4F 1.0 11/28/2021    TSH 7.520 (H) 11/28/20

## 2021-11-30 NOTE — OCCUPATIONAL THERAPY NOTE
OCCUPATIONAL THERAPY TREATMENT NOTE - INPATIENT        Room Number: 143/885-W           Presenting Problem: biphasic stridor    Problem List  Principal Problem:    Biphasic stridor  Active Problems:    GERD (gastroesophageal reflux disease)    Hypothyroidi OBJECTIVE  Precautions:  Other (Comment) (G tube, trach collar to supplemental O2)            PAIN ASSESSMENT  Ratin  Location: abdomen        ACTIVITIES OF DAILY LIVING ASSESSMENT  AM-PAC ‘6-Clicks’ Inpatient Daily Activity Short Form  How much hel

## 2021-11-30 NOTE — PLAN OF CARE
Problem: Patient Centered Care  Goal: Patient preferences are identified and integrated in the patient's plan of care  Description: Interventions:  - What would you like us to know as we care for you?   - Provide timely, complete, and accurate informatio Absence of cardiac arrhythmias or at baseline  Description: INTERVENTIONS:  - Continuous cardiac monitoring, monitor vital signs, obtain 12 lead EKG if indicated  - Evaluate effectiveness of antiarrhythmic and heart rate control medications as ordered  - I

## 2021-11-30 NOTE — PHYSICAL THERAPY NOTE
PHYSICAL THERAPY TREATMENT NOTE - INPATIENT     Room Number: 015/581-D       Presenting Problem: pharyngeal and laryngeal edema related to cervical hardware infection, s/p tracheostomy 11/22       Problem List  Principal Problem:    Biphasic stridor  Activ to ambulate multiple times daily with staff assist to improve strength and endurance, pt verbalized understanding. Pt left seated in chair, all needs in place, RN aware.     The patient's Approx Degree of Impairment: 50.57% has been calculated based on docu in bed (including adjusting bedclothes, sheets and blankets)?: A Little   Patient Difficulty: Sitting down on and standing up from a chair with arms (e.g., wheelchair, bedside commode, etc.): A Little   Patient Difficulty: Moving from lying on back to sitt initiated     Goal #5 Patient to demonstrate independence with home activity/exercise instructions provided to patient in preparation for discharge.    Goal #5   Current Status In progress    Goal #6     Goal #6  Current Status

## 2021-11-30 NOTE — PROGRESS NOTES
[facility]    Progress Note      Assessment and Plan:      1.  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  She is s/p tracheostomy on 11/22 an crackles; negative for wheezes or rhonchi  Cardiac RRR, S1S2  Abdomen slight tenderness at incision; non distended; BS+; G tube present with site intact  Extremities without clubbing cyanosis, bilateral LE with 2+ edema; distal pulses 2+  Neurologic grossl

## 2021-11-30 NOTE — PLAN OF CARE
Pt Aox4, follows commands and communicates through mouthing words. Home health met with pt and family today - plans for pt to discharge to home w home health. Plans to trial uncuffed trach tomorrow. Pt ambulated in sheth with PT - tolerated well.   Pt restin Evaluate fluid balance, assess for edema, trend weights  Outcome: Progressing  Goal: Absence of cardiac arrhythmias or at baseline  Description: INTERVENTIONS:  - Continuous cardiac monitoring, monitor vital signs, obtain 12 lead EKG if indicated  - Evalua

## 2021-12-01 ENCOUNTER — APPOINTMENT (OUTPATIENT)
Dept: GENERAL RADIOLOGY | Facility: HOSPITAL | Age: 81
DRG: 003 | End: 2021-12-01
Attending: CLINICAL NURSE SPECIALIST
Payer: MEDICARE

## 2021-12-01 PROCEDURE — 99233 SBSQ HOSP IP/OBS HIGH 50: CPT | Performed by: INTERNAL MEDICINE

## 2021-12-01 PROCEDURE — 71045 X-RAY EXAM CHEST 1 VIEW: CPT | Performed by: CLINICAL NURSE SPECIALIST

## 2021-12-01 PROCEDURE — 99221 1ST HOSP IP/OBS SF/LOW 40: CPT | Performed by: OTOLARYNGOLOGY

## 2021-12-01 PROCEDURE — 31502 CHANGE OF WINDPIPE AIRWAY: CPT | Performed by: OTOLARYNGOLOGY

## 2021-12-01 RX ORDER — FUROSEMIDE 10 MG/ML
20 INJECTION INTRAMUSCULAR; INTRAVENOUS ONCE
Status: DISCONTINUED | OUTPATIENT
Start: 2021-12-01 | End: 2021-12-01

## 2021-12-01 RX ORDER — AMOXICILLIN AND CLAVULANATE POTASSIUM 500; 125 MG/1; MG/1
500 TABLET, FILM COATED ORAL EVERY 12 HOURS SCHEDULED
Status: DISCONTINUED | OUTPATIENT
Start: 2021-12-01 | End: 2021-12-01

## 2021-12-01 RX ORDER — AMOXICILLIN AND CLAVULANATE POTASSIUM 200; 28.5 MG/5ML; MG/5ML
5 POWDER, FOR SUSPENSION ORAL 2 TIMES DAILY
Qty: 900 ML | Refills: 3 | Status: SHIPPED | OUTPATIENT
Start: 2021-12-01 | End: 2022-03-01

## 2021-12-01 RX ORDER — AMOXICILLIN AND CLAVULANATE POTASSIUM 500; 125 MG/1; MG/1
500 TABLET, FILM COATED ORAL EVERY 12 HOURS SCHEDULED
Status: DISCONTINUED | OUTPATIENT
Start: 2021-12-01 | End: 2021-12-02

## 2021-12-01 RX ORDER — FUROSEMIDE 10 MG/ML
40 INJECTION INTRAMUSCULAR; INTRAVENOUS ONCE
Status: COMPLETED | OUTPATIENT
Start: 2021-12-01 | End: 2021-12-01

## 2021-12-01 NOTE — PROGRESS NOTES
[facility]    Progress Note    Debra Crain Patient Status:  Inpatient    1940 MRN [id number]   Location [facility] 2W/SW Attending Born, [address] Day # 13 PCP Maik Door, DO       Subjective:   Debra Crain is 11/28/2021    HGB 11.6 (L) 11/28/2021    HCT 36.9 11/28/2021    .0 11/28/2021    CREATSERUM 1.72 (H) 12/01/2021    BUN 45 (H) 12/01/2021     12/01/2021    K 5.1 12/01/2021     12/01/2021    CO2 31.0 12/01/2021     (H) 12/01/2021

## 2021-12-01 NOTE — PHYSICAL THERAPY NOTE
PHYSICAL THERAPY TREATMENT NOTE - INPATIENT     Room Number: 134/863-G       Presenting Problem: pharyngeal and laryngeal edema related to cervical hardware infection, s/p tracheostomy 11/22       Problem List  Principal Problem:    Biphasic stridor  Activ fatigue. Discussed with son recommendation for pt to use w/c and bump up/down with 2 person assist, son and pt agreeable. Son's reports he has done so in the past with another family member.  Discussed d/c plan for home with 24 hr care, HHPT/OT/SLP therapie (including adjusting bedclothes, sheets and blankets)?: A Little   Patient Difficulty: Sitting down on and standing up from a chair with arms (e.g., wheelchair, bedside commode, etc.): A Little   Patient Difficulty: Moving from lying on back to sitting on initiated     Goal #5 Patient to demonstrate independence with home activity/exercise instructions provided to patient in preparation for discharge.    Goal #5   Current Status In progress    Goal #6     Goal #6  Current Status

## 2021-12-01 NOTE — DIETARY NOTE
ADULT NUTRITION REASSESSMENT    Pt is at high nutrition risk. Pt meets severe malnutrition criteria.       CRITERIA FOR MALNUTRITION DIAGNOSIS:  Criteria for severe malnutrition diagnosis: chronic illness related to energy intake less than 75% for greater goal pending labs and tolerance. Diet hx per sons at bedside and pt (nodding or shaking head)  pt weight from 175# in June down to 155 lbs in Sept mostly d/t fluid losses. Further wt loss from 155 to now 134 lbs over past 2 mos.  Sons emphasize pt had been Obtained  GASTROINTESTINAL: +BM 11/30 large soft and +Bm today 12/1. Bria Beal Pt on bowel regime. MEDICATIONS: reviewed 11/29 off LR IVF. Corrective insulin dc'ed. prilosec to start tomorrow therefore will adjust tube feeds.    • Dextrose-NaCl 70 mL/hr at 11/2 wt loss in past 5 mos: severe wt loss.    WEIGHT HISTORY:  Patient Weight(s) for the past 336 hrs:   Weight   11/24/21 1513 66 kg (145 lb 8.1 oz)     Wt Readings from Last 10 Encounters:  11/24/21 : 66 kg (145 lb 8.1 oz)  06/23/21 : 84 kg (185 lb 3 oz)  06/ other providers and discussed in Care Rounds   - Discharge and transfer of nutrition care to new setting or provider: monitor plans pt from home with family.     MONITOR AND EVALUATE/NUTRITION GOALS:  - Food and Nutrient Administration:      Monitor: tolera

## 2021-12-01 NOTE — PROGRESS NOTES
INFECTIOUS DISEASE PROGRESS NOTE    Edy Katlyn Patient Status:  Inpatient    1940 MRN [id number]   Location [facility] 2W/SW Attending Born, [address] Day # 15 PCP Sarita Nixon DO     Subjective:   ROS done. No acute events.  Si episode, in full remission ([address]     Bilateral carotid artery disease, unspecified type ([address]     Cervical myelopathy ([address]     Dysphagia     Dysphagia, unspecified type     Aspiration into airway, initial encounter     Perforation of esophagus     Stridor 11/23. Her hospital course was further complicated by acute appendicitis status post exploratory lap with appendectomy 11/24.   She she completed 10 days course of IV vancomycin and Zosyn and is now off IV antibiotics.      # Deep tissue cervical spine inf

## 2021-12-01 NOTE — PLAN OF CARE
Received patient A&O x4 and can mouth words on trach collar. VSS. 8L/30% on trach collar and is stable on those settings. Patient able to orally suction herself and inline suctioning PRN. Able to get up to chair with a two person assist with walker.  Alvin suctioning and perform as needed  - Assess and instruct to report SOB or any respiratory difficulty  - Respiratory Therapy support as indicated  - Manage/alleviate anxiety  - Monitor for signs/symptoms of CO2 retention  Outcome: Progressing     Problem: CA INTERVENTIONS:  - Monitor Blood Glucose as ordered  - Assess for signs and symptoms of hyperglycemia and hypoglycemia  - Administer ordered medications to maintain glucose within target range  - Assess barriers to adequate nutritional intake and initiate n

## 2021-12-01 NOTE — PROCEDURES
Full report is in the body of the note dated 12/1/21. Kerri Rasmussen changed from a cuffed tube to a cuffless #8 tracheostomy tube. No complications.

## 2021-12-01 NOTE — PROGRESS NOTES
Amarilys Worrelllawrence is a 78year old female.   Patient presents with:  Difficulty Breathing      HISTORY OF PRESENT ILLNESS  Patient is a 78year old female who was admitted to the hospital for Biphasic stridor:  She presents to the ED on 11/16/2021 with worseni the glottis consistent with her history of inflammation surrounding the previously placed hardware.  Patient apparently has an appointment to meet with Dr. Kassi Zapata a laryngologist later on this month but unfortunately over the past several days decompensat CPAP.  Doing well this morning sitting up in a chair without any complaints or concerns.   Son is in the room and has many questions regarding future management of her tracheostomy with respect to voicing swallowing and also has questions about future place • CATARACT Bilateral     Dec 2018 (R), Jan 2019 (L)   • CHOLECYSTECTOMY  3/19/15     Laparoscopic by Dr. Jed Tanner   • COLONOSCOPY  6/3/14    colon polyp and EGD done too.    • COLONOSCOPY  08/06/2019   • EGD  08/06/2019    gastric polyp removed (benign) Lips/teeth/gums - Normal. Tonsils - Normal. Oropharynx - Normal.   Ears Normal Inspection - Right: Normal, Left: Normal. Canal - Right: Normal, Left: Normal. TM - Right: Normal, Left: Normal.   Skin Normal Inspection - Normal.   Trach site   Healthy.  trach tube removed and replaced with #8 uncuffed Portex tube. Did well and was able to voice readily with occlusion of the tracheostomy tube. Recommend Passe-Radhames Valve trial with speech path.   20 minutes spent with patient with greater than 50% of the time spen

## 2021-12-01 NOTE — PLAN OF CARE
Patient A&O x4 and able to mouth words through trach collar; follows commands. 02 weaned off and satting well on room air. Inline suctioning performed several times. Frequent oral suctioning done by patient. Esquivel present with good output.  PT/OT evaluated difficulty  - Respiratory Therapy support as indicated  - Manage/alleviate anxiety  - Monitor for signs/symptoms of CO2 retention  Outcome: Progressing     Problem: CARDIOVASCULAR - ADULT  Goal: Maintains optimal cardiac output and hemodynamic stability  D hyperglycemia and hypoglycemia  - Administer ordered medications to maintain glucose within target range  - Assess barriers to adequate nutritional intake and initiate nutrition consult as needed  - Instruct patient on self management of diabetes  Outcome:

## 2021-12-01 NOTE — PROGRESS NOTES
[facility]    Progress Note      Assessment and Plan:      1.  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 She is s/p tracheostomy on 11/22.  Owen Sherwood pulse 78, temperature 98 °F (36.7 °C), resp.  rate 26, height 162.6 cm (5' 4\"), weight 145 lb 8.1 oz (66 kg), SpO2 100 %    Physical Exam NAD, negative for fever  HEENT examination is unremarkable; PERRL   Neck negative for JVD; neck supple; tracheostomy i

## 2021-12-01 NOTE — CM/SW NOTE
CM received call from Jakub from [address] [place] [address]. She is requesting new HH/ F2F to indicate RN, PT, OT and ST. She also will need to know new cuff size before sending over certificate of medical need (CMN).     New HH order/f2f entered and sent via A

## 2021-12-01 NOTE — PROGRESS NOTES
[facility] Pharmacy Note: Route Optimization for Pantoprazole (PROTONIX)    Patient is currently on Pantoprazole (PROTONIX) 40 mg IV every 24 hours.    The patient meets the criteria to convert to the oral equivalent as established by the IV to Oral conversion pro

## 2021-12-02 ENCOUNTER — TELEPHONE (OUTPATIENT)
Dept: INTERNAL MEDICINE UNIT | Facility: HOSPITAL | Age: 81
End: 2021-12-02

## 2021-12-02 VITALS
HEART RATE: 76 BPM | DIASTOLIC BLOOD PRESSURE: 50 MMHG | SYSTOLIC BLOOD PRESSURE: 124 MMHG | TEMPERATURE: 98 F | WEIGHT: 145.5 LBS | BODY MASS INDEX: 24.84 KG/M2 | HEIGHT: 64 IN | OXYGEN SATURATION: 98 % | RESPIRATION RATE: 18 BRPM

## 2021-12-02 DIAGNOSIS — R06.2 WHEEZING: Primary | ICD-10-CM

## 2021-12-02 DIAGNOSIS — R06.00 DYSPNEA AND RESPIRATORY ABNORMALITIES: ICD-10-CM

## 2021-12-02 DIAGNOSIS — R06.89 DYSPNEA AND RESPIRATORY ABNORMALITIES: ICD-10-CM

## 2021-12-02 PROCEDURE — 99233 SBSQ HOSP IP/OBS HIGH 50: CPT | Performed by: INTERNAL MEDICINE

## 2021-12-02 RX ORDER — IPRATROPIUM BROMIDE AND ALBUTEROL SULFATE 2.5; .5 MG/3ML; MG/3ML
SOLUTION RESPIRATORY (INHALATION)
Status: COMPLETED
Start: 2021-12-02 | End: 2021-12-02

## 2021-12-02 RX ORDER — IPRATROPIUM BROMIDE AND ALBUTEROL SULFATE 2.5; .5 MG/3ML; MG/3ML
3 SOLUTION RESPIRATORY (INHALATION) EVERY 6 HOURS PRN
Status: DISCONTINUED | OUTPATIENT
Start: 2021-12-02 | End: 2021-12-02

## 2021-12-02 RX ORDER — AMOXICILLIN AND CLAVULANATE POTASSIUM 500; 125 MG/1; MG/1
500 TABLET, FILM COATED ORAL EVERY 12 HOURS SCHEDULED
Qty: 60 TABLET | Refills: 2 | Status: SHIPPED | OUTPATIENT
Start: 2021-12-02

## 2021-12-02 RX ORDER — IPRATROPIUM BROMIDE AND ALBUTEROL SULFATE 2.5; .5 MG/3ML; MG/3ML
3 SOLUTION RESPIRATORY (INHALATION) EVERY 6 HOURS PRN
Qty: 30 ML | Refills: 3 | Status: SHIPPED | OUTPATIENT
Start: 2021-12-02

## 2021-12-02 RX ORDER — IPRATROPIUM BROMIDE AND ALBUTEROL SULFATE 2.5; .5 MG/3ML; MG/3ML
3 SOLUTION RESPIRATORY (INHALATION)
Qty: 90 ML | Refills: 2 | Status: SHIPPED | OUTPATIENT
Start: 2021-12-02 | End: 2021-12-02

## 2021-12-02 NOTE — OCCUPATIONAL THERAPY NOTE
Patient is being prepped for d/c back to home. Will leave patient on schedule for tomorrow in case change occurs. Patient and family education have been completed. Will have [facility] OT at d/c- will continue to follow.      [facility], OTR/L ext [phone]

## 2021-12-02 NOTE — TELEPHONE ENCOUNTER
Pharmacy called. Medication not covered without appropriate diagnosis.   Resent medication to [employer]

## 2021-12-02 NOTE — PLAN OF CARE
Pt alert and appropriate. Follows command. Can mouths words. Anxious at times relieved with prn antianxiety medication. Tracheostomy at room air. Suctioned as needed. Peg tube with tube feeding at goal rate. Son at bedside who will be primary caretaker.  Jacqueline Santiago respiratory effort  - Oxygen supplementation based on oxygen saturation or ABGs  - Provide Smoking Cessation handout, if applicable  - Encourage broncho-pulmonary hygiene including cough, deep breathe, Incentive Spirometry  - Assess the need for suctioning needed  - Advance activity as appropriate  - Communicate ordered activity level and limitations with patient/family  Outcome: Progressing     Problem: Diabetes/Glucose Control  Goal: Glucose maintained within prescribed range  Description: INTERVENTIONS:

## 2021-12-02 NOTE — PROGRESS NOTES
[facility]    Ortho Medical Progress Note     Evens Gaviria Patient Status:  Inpatient    1940 MRN [id number]   Location [facility] 2W/SW Attending Born, [address] Day # 16 PCP Ej Augustine DO       Subjective:   Clover Swann HCT 36.9 11/28/2021    .0 11/28/2021    CREATSERUM 1.72 (H) 12/01/2021    BUN 45 (H) 12/01/2021     12/01/2021    K 5.1 12/01/2021     12/01/2021    CO2 31.0 12/01/2021     (H) 12/01/2021    CA 8.9 12/01/2021    ALB 1.7 (L) 11/25/

## 2021-12-02 NOTE — PROGRESS NOTES
[facility]    Progress Note      Assessment and Plan:      1.  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 She is s/p tracheostomy on 11/22.  Raine Stout edema; distal pulses 2+  Neurologic grossly intact with symmetric tone, strength and reflex; appropriate mood and affect  Skin warm dry and intact with no rashes or lesions.  Abdominal incision intact with dressing in place     Results:            [employer]

## 2021-12-02 NOTE — CM/SW NOTE
12/02/21 0900   Discharge disposition   Expected discharge disposition Home-Health   Post Acute Care Provider [facility]   Discharge transportation Other (comment)     Pt ready for discharge today.   Confirmed details with Gretchen from [facility]

## 2021-12-03 NOTE — DISCHARGE PLANNING
Patient discharged with Baldomero Berumen. Son Pranav Romo aware and bedside. Teaching of trachs/ PEGs/ medications/ toileting. Patient nervous but ready to go home. Prescriptions given to Pranav Romo. Medication administration  And side effects discussed.  Verified with Jadon all

## 2021-12-03 NOTE — DISCHARGE SUMMARY
[place] [facility] - [facility]    Discharge Summary    Gil Thao Patient Status:  Inpatient    1940 MRN [id number]   Location [facility] 2W/SW Attending No att. providers found   Hosp Day # 12 PCP Dilia Clemens DO     Date of Admission:  Charly Woodward MD  Consulting Physician  SURGERY, GENERAL     Javi Gonzáles MD  Consulting Physician  Surgery, Orthopaedic     Fior Velarde MD  Consulting Physician  Princess Galeano MD  Consulting Physician  PULMONARY DISEASES Normal, Disp-30 mL, R-3    ALPRAZolam 0.5 MG Oral Tab  1 tablet (0.5 mg total) by Per NG Tube route 2 (two) times daily as needed for Anxiety., Normal, Disp-45 tablet, R-1      Home Meds - Unchanged    Solifenacin Succinate 5 MG Oral Tab  Take 5 mg by mout

## 2021-12-08 ENCOUNTER — TELEPHONE (OUTPATIENT)
Dept: INTERNAL MEDICINE UNIT | Facility: HOSPITAL | Age: 81
End: 2021-12-08

## 2021-12-08 DIAGNOSIS — Z93.1 FEEDING BY G-TUBE (HCC): ICD-10-CM

## 2021-12-08 DIAGNOSIS — R13.19 ESOPHAGEAL DYSPHAGIA: Primary | ICD-10-CM

## 2021-12-08 RX ORDER — GLYCOPYRROLATE 1 MG/1
1 TABLET ORAL 2 TIMES DAILY PRN
Qty: 60 TABLET | Refills: 3 | Status: SHIPPED | OUTPATIENT
Start: 2021-12-08

## 2021-12-08 NOTE — TELEPHONE ENCOUNTER
Spoke to son. Patient is using scopolamine patch but still has secretions. Will order glycopyrrolate 1 mg prn. Discussed dosing and not using scheduled but prn.

## 2022-01-05 ENCOUNTER — TELEPHONE (OUTPATIENT)
Dept: HEMATOLOGY/ONCOLOGY | Facility: HOSPITAL | Age: 82
End: 2022-01-05

## 2022-01-06 NOTE — OPERATIVE REPORT
Pre-Operative Diagnosis: Abdominal pain, leukocytosis     Post-Operative Diagnosis: Abdominal pain, leukocytosis     Procedure Performed:   WOUND EXPLORATION, EXPLORATORY LAPAROTOMY, APPENDECTOMY, PERITONEAL LAVAGE     Surgeon(s) and Role: irrigated with saline and found to be hemostatic. The staple line was hemostatic. The ports were withdrawn under direct visualization and no port site bleeding was seen.   The umbilical fascia and the left lower quadrant incision were closed using 0 vicry

## 2022-01-07 ENCOUNTER — TELEPHONE (OUTPATIENT)
Dept: FAMILY MEDICINE CLINIC | Facility: CLINIC | Age: 82
End: 2022-01-07

## 2022-01-07 ENCOUNTER — TELEPHONE (OUTPATIENT)
Dept: HEMATOLOGY/ONCOLOGY | Facility: HOSPITAL | Age: 82
End: 2022-01-07

## 2022-01-07 ENCOUNTER — DIETICIAN VISIT (OUTPATIENT)
Dept: NUTRITION | Facility: HOSPITAL | Age: 82
End: 2022-01-07

## 2022-01-07 DIAGNOSIS — Z93.1 GASTROSTOMY STATUS (HCC): Primary | ICD-10-CM

## 2022-01-07 DIAGNOSIS — R63.4 LOSS OF WEIGHT: ICD-10-CM

## 2022-01-07 DIAGNOSIS — Z93.0 STATUS POST TRACHEOSTOMY (HCC): ICD-10-CM

## 2022-01-07 NOTE — PROGRESS NOTES
Pt is an outpt now. Got messages from MD office. Called MD office and called pts son. Original TF order was for 65ml/hr of Jevity 1.2 based on an average 22hr infusion time.   Son reports at home he is doing 65ml/hr but is off for at least 3hrs due to a med

## 2022-01-07 NOTE — TELEPHONE ENCOUNTER
Spoke with Saray (Dietician) ,son would like to have outpatient consult with dietician in the future . Referral ordered.

## 2022-01-12 ENCOUNTER — TELEPHONE (OUTPATIENT)
Dept: SURGERY | Facility: CLINIC | Age: 82
End: 2022-01-12

## 2022-01-12 NOTE — TELEPHONE ENCOUNTER
Slava Braxton from Dr. Amara Washington office states patient wound is open and have concerns after procedure. PCP office requesting to call patients son back.   Please advise

## 2022-01-12 NOTE — TELEPHONE ENCOUNTER
LMTCB. We haven't seen patient in our office, DOS 11/24/21. Can schedule a follow up apt for [facility] @ [phone].

## 2022-01-24 ENCOUNTER — OFFICE VISIT (OUTPATIENT)
Dept: SURGERY | Facility: CLINIC | Age: 82
End: 2022-01-24
Payer: MEDICARE

## 2022-01-24 VITALS — HEIGHT: 64 IN | BODY MASS INDEX: 24.75 KG/M2 | WEIGHT: 145 LBS

## 2022-01-24 DIAGNOSIS — Z98.890 POST-OPERATIVE STATE: Primary | ICD-10-CM

## 2022-01-24 DIAGNOSIS — T81.89XA GRANULOMA OF SURGICAL WOUND, INITIAL ENCOUNTER: ICD-10-CM

## 2022-01-24 PROCEDURE — 17250 CHEM CAUT OF GRANLTJ TISSUE: CPT | Performed by: SURGERY

## 2022-01-25 ENCOUNTER — HOSPITAL ENCOUNTER (OUTPATIENT)
Dept: NUTRITION | Facility: HOSPITAL | Age: 82
Discharge: HOME | End: 2022-01-25
Attending: FAMILY MEDICINE
Payer: MEDICARE

## 2022-01-25 DIAGNOSIS — Z93.1 GASTROSTOMY STATUS (HCC): ICD-10-CM

## 2022-01-25 DIAGNOSIS — R13.10 DYSPHAGIA, UNSPECIFIED TYPE: ICD-10-CM

## 2022-01-25 DIAGNOSIS — R63.4 LOSS OF WEIGHT: ICD-10-CM

## 2022-01-25 DIAGNOSIS — Z93.0 STATUS POST TRACHEOSTOMY (HCC): ICD-10-CM

## 2022-01-25 PROCEDURE — 97802 MEDICAL NUTRITION INDIV IN: CPT | Performed by: DIETITIAN, REGISTERED

## 2022-01-26 NOTE — PROGRESS NOTES
Nutrition Assessment    Adan Hermosillo is a 78year old female. Referred by:  Attending  Referring Physician Name: Alem Pa    Assessment     Medical Nutrition Therapy Comment: G-tube, Mellody Blackwell loss, Dysphagia  Visit Information: Initial Visit 1/25/22 total) by Per NG Tube route 2 (two) times daily as needed for Anxiety. , Disp: 45 tablet, Rfl: 1  •  Solifenacin Succinate 5 MG Oral Tab, Take 5 mg by mouth daily. , Disp: , Rfl:   •  saccharomyces boulardii 250 MG Oral Cap, Take 250 mg by mouth 2 (two) time Education: Recommended Modification  Nutrition/Diet Handouts Given: Other Handouts Provided: Goal sheet      NUTRITION PRESCRIPTION:      Needs:  1955-2909 (25-30 kcal/kg) Calorie     77-96 (1.2-15 gm/kg) Protein      Oral Diet Prescription: G-tube feeding

## 2022-01-26 NOTE — PROGRESS NOTES
S:  Deion Lawrence is a 78year old female sp g tube placement and laparoscopic appy  Doing well, no fevers, no chills, tolerating a general diet, generally normal bowel movements, no calf tenderness nor lower extremity edema, no shortness of breath, no ch

## 2022-02-09 ENCOUNTER — OFFICE VISIT (OUTPATIENT)
Dept: SURGERY | Facility: CLINIC | Age: 82
End: 2022-02-09
Payer: MEDICARE

## 2022-02-09 VITALS — BODY MASS INDEX: 25 KG/M2 | WEIGHT: 145 LBS

## 2022-02-09 DIAGNOSIS — T81.89XA GRANULOMA OF SURGICAL WOUND, INITIAL ENCOUNTER: Primary | ICD-10-CM

## 2022-02-09 PROCEDURE — 17250 CHEM CAUT OF GRANLTJ TISSUE: CPT | Performed by: SURGERY

## 2022-02-16 ENCOUNTER — OFFICE VISIT (OUTPATIENT)
Dept: SURGERY | Facility: CLINIC | Age: 82
End: 2022-02-16
Payer: MEDICARE

## 2022-02-16 DIAGNOSIS — D48.5 NEOPLASM OF UNCERTAIN BEHAVIOR OF SKIN: Primary | ICD-10-CM

## 2022-02-16 PROCEDURE — 11042 DBRDMT SUBQ TIS 1ST 20SQCM/<: CPT | Performed by: SURGERY

## 2022-02-16 NOTE — PROCEDURES
S:  Melecio Garcia is a 78year old female sp g tube placement and laparoscopic appy. She has multiple medical problems which pre-date her appendectomy, including dysphagia requiring G tube feedings and suctioning of oral secretions. She is s/p tracheostomy. She had a recent swallowing eval for re-assessment which showed some improvement. She has had nocturnal coughing for which her son gives her cough medicine with codeine. She had some N/V/abd pain after increased feeding volumes for overnight feeding. They are adjusting the TF rate accordingly. She also had some constipation, possibly due to the codeine. She has no fevers, no chills, no calf tenderness nor lower extremity edema, no shortness of breath, no chest pain. Speedy Rivera presents today for chemical cauterization of a granulomatous change at the gastrostomy site. O:  There were no vitals taken for this visit. GEN:  No acute distress  L: nonlabored respirations, CTA  H: reg rate  Abd:  Soft, NT,ND. Skin: Incision C D I, no eryth. Gastrostomy site with a 60%% circumferential granulomatous change. Extr: No edema, no calf tenderness, neg Rabia's sign    Procedure: Sharp excisional debridement with scissors/scalpel of the granuloma, skin and subcutaneous tissue around G-tube site. Diagnosis: Granuloma and necrotic tissue surrounding G-tube site. Scription: The G-tube cuff was released, the skin was prepped around the site. The granuloma was grasped and elevated with forceps. Sharp excisional debridement with scissors/scalpel of the granuloma, skin and subcutaneous tissue around G-tube site. Hemostasis was assured. Next,  Silver nitrate applied to granulomatous tissue surrounding gastrostomy site. Steril gauze applied. Pt tolerated well. .          Assessment   Neoplasm of uncertain behavior of skin  (primary encounter diagnosis)    Sharp excisional debridement today.   Nitrate applied today, reapplication should be planned in the next few weeks. Continue to keep incision clean and dry. Maintain a healthy diet. Maintain good hydration. RTC 1-2 weeks as needed.          Charli aRmos MD

## 2022-02-23 ENCOUNTER — OFFICE VISIT (OUTPATIENT)
Dept: SURGERY | Facility: CLINIC | Age: 82
End: 2022-02-23
Payer: MEDICARE

## 2022-02-23 VITALS — WEIGHT: 145 LBS | BODY MASS INDEX: 24.75 KG/M2 | HEIGHT: 64 IN

## 2022-02-23 DIAGNOSIS — T81.89XA GRANULOMA OF SURGICAL WOUND, INITIAL ENCOUNTER: Primary | ICD-10-CM

## 2022-02-23 DIAGNOSIS — S31.109D OPEN WOUND OF ABDOMINAL WALL, ANTERIOR, SUBSEQUENT ENCOUNTER: ICD-10-CM

## 2022-02-23 PROCEDURE — 17250 CHEM CAUT OF GRANLTJ TISSUE: CPT | Performed by: SURGERY

## 2022-03-07 ENCOUNTER — TELEPHONE (OUTPATIENT)
Dept: NUTRITION | Facility: HOSPITAL | Age: 82
End: 2022-03-07

## 2022-03-09 ENCOUNTER — OFFICE VISIT (OUTPATIENT)
Dept: SURGERY | Facility: CLINIC | Age: 82
End: 2022-03-09
Payer: MEDICARE

## 2022-03-09 VITALS — HEIGHT: 64 IN | WEIGHT: 145 LBS | BODY MASS INDEX: 24.75 KG/M2

## 2022-03-09 DIAGNOSIS — T81.89XA GRANULOMA OF SURGICAL WOUND, INITIAL ENCOUNTER: Primary | ICD-10-CM

## 2022-03-09 PROCEDURE — 17250 CHEM CAUT OF GRANLTJ TISSUE: CPT | Performed by: SURGERY

## 2022-03-23 ENCOUNTER — OFFICE VISIT (OUTPATIENT)
Dept: SURGERY | Facility: CLINIC | Age: 82
End: 2022-03-23
Payer: MEDICARE

## 2022-03-23 ENCOUNTER — LAB ENCOUNTER (OUTPATIENT)
Dept: LAB | Age: 82
End: 2022-03-23
Attending: INTERNAL MEDICINE
Payer: MEDICARE

## 2022-03-23 VITALS — HEIGHT: 64 IN | BODY MASS INDEX: 24.75 KG/M2 | WEIGHT: 145 LBS

## 2022-03-23 DIAGNOSIS — M46.20 PARASPINAL ABSCESS (HCC): Primary | ICD-10-CM

## 2022-03-23 DIAGNOSIS — T81.89XA GRANULOMA OF SURGICAL WOUND, INITIAL ENCOUNTER: Primary | ICD-10-CM

## 2022-03-23 PROCEDURE — 36415 COLL VENOUS BLD VENIPUNCTURE: CPT

## 2022-03-23 PROCEDURE — 87040 BLOOD CULTURE FOR BACTERIA: CPT

## 2022-03-23 PROCEDURE — 99213 OFFICE O/P EST LOW 20 MIN: CPT | Performed by: SURGERY

## 2022-04-01 ENCOUNTER — TELEPHONE (OUTPATIENT)
Dept: SURGERY | Facility: CLINIC | Age: 82
End: 2022-04-01

## 2022-04-06 ENCOUNTER — OFFICE VISIT (OUTPATIENT)
Dept: SURGERY | Facility: CLINIC | Age: 82
End: 2022-04-06
Payer: MEDICARE

## 2022-04-06 VITALS — HEIGHT: 64 IN | BODY MASS INDEX: 24.75 KG/M2 | WEIGHT: 145 LBS

## 2022-04-06 DIAGNOSIS — Z98.890 POST-OPERATIVE STATE: Primary | ICD-10-CM

## 2022-04-06 PROCEDURE — 99212 OFFICE O/P EST SF 10 MIN: CPT | Performed by: SURGERY

## 2022-04-06 RX ORDER — AMOXICILLIN AND CLAVULANATE POTASSIUM 250; 62.5 MG/5ML; MG/5ML
POWDER, FOR SUSPENSION ORAL
COMMUNITY
Start: 2022-04-02

## 2022-04-20 NOTE — PROGRESS NOTES
INFECTIOUS DISEASE PROGRESS NOTE  [facility]    Stevie Burden Patient Status:  Inpatient    1940 MRN [id number]   Location [facility] 5SW/SE Attending Thais Sheehan MD   Hosp Day # 7 PCP Amita Duff [address] disorder, recurrent episode, in full remission ([facility] [address]     Bilateral carotid artery disease, unspecified type ([address]     Cervical myelopathy ([address]     Dysphagia     Dysphagia, unspecified type     Aspiration into airway, initial encounter     Perforation of e PA-C   [facility]  [phone]  4/26/2021

## 2022-04-28 ENCOUNTER — TELEPHONE (OUTPATIENT)
Dept: SURGERY | Facility: CLINIC | Age: 82
End: 2022-04-28

## 2022-05-04 ENCOUNTER — OFFICE VISIT (OUTPATIENT)
Dept: SURGERY | Facility: CLINIC | Age: 82
End: 2022-05-04
Payer: MEDICARE

## 2022-05-04 DIAGNOSIS — K94.23 GASTROSTOMY TUBE DYSFUNCTION (HCC): Primary | ICD-10-CM

## 2022-05-04 DIAGNOSIS — T81.89XA GRANULOMA OF SURGICAL WOUND, INITIAL ENCOUNTER: ICD-10-CM

## 2022-05-04 PROCEDURE — 99212 OFFICE O/P EST SF 10 MIN: CPT | Performed by: SURGERY

## 2022-05-04 NOTE — PROGRESS NOTES
[address] [facility] SURGERY    Progress Note    Rosemarie Jones Patient Status:  Specimen    1940 MRN [id number]   Location [place] [address] Attending No att. providers found   [address] Day # 0 PCP Mary Gordillo DO       Subjective:   Rosemarie Jones is a(n) 78year old female here for a gtube check  No new complaints    Objective:   Vital Signs:      General: No acute distress. Alert and oriented x 3. HEENT: Moist mucous membranes. Neck: No lymphadenopathy. No JVD. Respiratory: Clear    Cardiovascular:  Regular rate    Abdomen: Soft, nontender, nondistended. Positive bowel sounds. No rebound tenderness. G tube site clean, no drainage. Neurologic: No focal neurological deficits. Musculoskeletal: Full range of motion of all extremities. No swelling noted. Integument: No lesions. No erythema. Psychiatric: Appropriate mood and affect. Assessment and Plan:   G tube    Doing well, hopefully will dc tube once able to tolerate po. Results:     Lab Results   Component Value Date    WBC 12.2 (H) 2021    HGB 11.6 (L) 2021    HCT 36.9 2021    .0 2021    CREATSERUM 1.72 (H) 2021    BUN 45 (H) 2021     2021    K 5.1 2021     2021    CO2 31.0 2021     (H) 2021    CA 8.9 2021    ALB 1.7 (L) 2021    ALKPHO 52 (L) 2021    BILT 1.1 2021    TP 5.1 (L) 2021    AST 12 (L) 2021    ALT 21 2021    PTT 19.1 (L) 2021    INR 1.08 2021    T4F 1.0 2021    TSH 7.520 (H) 2021    ESRML 9 2021    CRP <0.29 2021    MG 2.0 2021    PHOS 4.3 2021    TROP <0.017 2015    B12 >2,000 (H) 2020       No results found.                 Thanh Fuentes MD  2022

## 2022-06-01 ENCOUNTER — TELEPHONE (OUTPATIENT)
Dept: SURGERY | Facility: CLINIC | Age: 82
End: 2022-06-01

## 2022-06-01 ENCOUNTER — OFFICE VISIT (OUTPATIENT)
Dept: SURGERY | Facility: CLINIC | Age: 82
End: 2022-06-01
Payer: MEDICARE

## 2022-06-01 VITALS — HEIGHT: 64 IN | BODY MASS INDEX: 24.75 KG/M2 | WEIGHT: 145 LBS

## 2022-06-01 DIAGNOSIS — K94.23 GASTROSTOMY TUBE DYSFUNCTION (HCC): Primary | ICD-10-CM

## 2022-06-01 PROCEDURE — 17250 CHEM CAUT OF GRANLTJ TISSUE: CPT | Performed by: SURGERY

## 2022-06-01 NOTE — TELEPHONE ENCOUNTER
Per pharmacy they received order for gastrostomy tube, but they do not fill these types of orders. please advise

## 2022-06-15 ENCOUNTER — OFFICE VISIT (OUTPATIENT)
Dept: SURGERY | Facility: CLINIC | Age: 82
End: 2022-06-15
Payer: MEDICARE

## 2022-06-15 VITALS — BODY MASS INDEX: 24.75 KG/M2 | WEIGHT: 145 LBS | HEIGHT: 64 IN

## 2022-06-15 DIAGNOSIS — K94.23 GASTROSTOMY TUBE DYSFUNCTION (HCC): Primary | ICD-10-CM

## 2022-06-15 PROCEDURE — 43762 RPLC GTUBE NO REVJ TRC: CPT | Performed by: SURGERY

## 2022-06-15 PROCEDURE — 17250 CHEM CAUT OF GRANLTJ TISSUE: CPT | Performed by: SURGERY

## 2022-06-15 NOTE — PROCEDURES
Pre op G tube dysfunction, granulation tissue at g tube site  Post op same  Procedure G tube removal and insertion of new G-tube over guidewire, application of silver nitrate for chemical cauterization of granulation tissue. Incications G tube dysfunction, granulation tissue at g tube site    Description The patient was placed in the supine position, the g-tube site was prepped and draped. The balloon was only able to be deflated partially, the port was cut to allow the remainder of the fluid to be removed. The guidewire was placed without difficulty, using Seldinger technique, the new 18 fr g tube was placed. The balloon was filled with 8 mL of water. Silver nitrate was applied to the granulation tissue. The pt alli the procedure well.

## 2022-11-22 NOTE — PLAN OF CARE
From: Desiree Zapata  To: Jamar Prado  Sent: 11/20/2022 3:51 PM CST  Subject: Chronic nose bleeds    This message is being sent by Sadie Zapata on behalf of Desiree Zapata.    Hi,  Desiree has had a problem with nose bleeds for several years now. Would he be able to get a referral to see an ENT?   ThanksSadie   Problem: Patient Centered Care  Goal: Patient preferences are identified and integrated in the patient's plan of care  Description: Interventions:  - What would you like us to know as we care for you?   - Provide timely, complete, and accurate informatio cardiac output  - Evaluate effectiveness of vasoactive medications to optimize hemodynamic stability  - Monitor arterial and/or venous puncture sites for bleeding and/or hematoma  - Assess quality of pulses, skin color and temperature  - Assess for signs o strengthening/mobility  - Encourage toileting schedule  Outcome: Completed     Problem: DISCHARGE PLANNING  Goal: Discharge to home or other facility with appropriate resources  Description: INTERVENTIONS:  - Identify barriers to discharge w/pt and caregiv

## 2022-12-02 ENCOUNTER — HOSPITAL ENCOUNTER (OUTPATIENT)
Dept: LAB | Age: 82
Discharge: HOME | End: 2022-12-02
Attending: OTOLARYNGOLOGY

## 2022-12-02 DIAGNOSIS — Z01.818 PREOPERATIVE EXAMINATION, UNSPECIFIED: ICD-10-CM

## 2022-12-02 DIAGNOSIS — I10 ESSENTIAL HYPERTENSION, BENIGN: Primary | ICD-10-CM

## 2022-12-02 DIAGNOSIS — Z01.818 PREOPERATIVE EXAMINATION, UNSPECIFIED: Primary | ICD-10-CM

## 2022-12-02 LAB
ATRIAL RATE (BPM): 73
P AXIS (DEGREES): 62
PR-INTERVAL (MSEC): 150
QRS-INTERVAL (MSEC): 96
QT-INTERVAL (MSEC): 430
QTC: 474
R AXIS (DEGREES): -32
REPORT TEXT: NORMAL
T AXIS (DEGREES): 21
VENTRICULAR RATE EKG/MIN (BPM): 73

## 2022-12-02 PROCEDURE — 93005 ELECTROCARDIOGRAM TRACING: CPT | Performed by: OTOLARYNGOLOGY

## 2022-12-02 PROCEDURE — 93010 ELECTROCARDIOGRAM REPORT: CPT | Performed by: INTERNAL MEDICINE

## 2022-12-09 ENCOUNTER — TELEPHONE (OUTPATIENT)
Dept: SURGERY | Facility: CLINIC | Age: 82
End: 2022-12-09

## 2022-12-09 ENCOUNTER — APPOINTMENT (OUTPATIENT)
Dept: GENERAL RADIOLOGY | Facility: HOSPITAL | Age: 82
End: 2022-12-09
Attending: EMERGENCY MEDICINE
Payer: MEDICARE

## 2022-12-09 ENCOUNTER — HOSPITAL ENCOUNTER (EMERGENCY)
Facility: HOSPITAL | Age: 82
Discharge: HOME | End: 2022-12-09
Attending: EMERGENCY MEDICINE
Payer: MEDICARE

## 2022-12-09 VITALS
HEART RATE: 80 BPM | OXYGEN SATURATION: 98 % | DIASTOLIC BLOOD PRESSURE: 60 MMHG | RESPIRATION RATE: 20 BRPM | TEMPERATURE: 100 F | SYSTOLIC BLOOD PRESSURE: 137 MMHG

## 2022-12-09 DIAGNOSIS — T85.528A DISLODGED GASTROSTOMY TUBE: Primary | ICD-10-CM

## 2022-12-09 PROCEDURE — 99283 EMERGENCY DEPT VISIT LOW MDM: CPT

## 2022-12-09 PROCEDURE — 43762 RPLC GTUBE NO REVJ TRC: CPT

## 2022-12-09 PROCEDURE — 74021 RADEX ABDOMEN 3+ VIEWS: CPT | Performed by: EMERGENCY MEDICINE

## 2022-12-09 RX ORDER — WATER 1000 ML/1000ML
INJECTION, SOLUTION INTRAVENOUS
Status: COMPLETED
Start: 2022-12-09 | End: 2022-12-09

## 2022-12-09 NOTE — TELEPHONE ENCOUNTER
Called [facility] and scheduled patient for g-tube placement, next available apt Wed 1/4/2023. I advised go to ED if becomes dislodged.

## 2022-12-09 NOTE — TELEPHONE ENCOUNTER
Pt's son called stating pt has g-tube. No longer staying in place. Need to schedule replacement. Caller does have the replacement g-tube.   Please call

## 2022-12-10 ENCOUNTER — HOSPITAL ENCOUNTER (EMERGENCY)
Facility: HOSPITAL | Age: 82
Discharge: HOME | End: 2022-12-11
Attending: STUDENT IN AN ORGANIZED HEALTH CARE EDUCATION/TRAINING PROGRAM
Payer: MEDICARE

## 2022-12-10 VITALS
TEMPERATURE: 97 F | HEART RATE: 81 BPM | SYSTOLIC BLOOD PRESSURE: 135 MMHG | OXYGEN SATURATION: 98 % | RESPIRATION RATE: 20 BRPM | DIASTOLIC BLOOD PRESSURE: 81 MMHG

## 2022-12-10 DIAGNOSIS — Z93.1 GASTROSTOMY TUBE IN PLACE (HCC): Primary | ICD-10-CM

## 2022-12-10 PROCEDURE — 99283 EMERGENCY DEPT VISIT LOW MDM: CPT

## 2022-12-11 ENCOUNTER — APPOINTMENT (OUTPATIENT)
Dept: GENERAL RADIOLOGY | Facility: HOSPITAL | Age: 82
End: 2022-12-11
Attending: STUDENT IN AN ORGANIZED HEALTH CARE EDUCATION/TRAINING PROGRAM
Payer: MEDICARE

## 2022-12-11 PROCEDURE — 74021 RADEX ABDOMEN 3+ VIEWS: CPT | Performed by: STUDENT IN AN ORGANIZED HEALTH CARE EDUCATION/TRAINING PROGRAM

## 2022-12-11 NOTE — ED INITIAL ASSESSMENT (HPI)
Patient arrives through triage with c/o of g-tube problem. Patient's son states that when he went to change the dressing today he noticed the g-tube was loose and as he was checking to see how secure it he noticed he was able to pull out about 2cm of the tube.

## 2022-12-11 NOTE — DISCHARGE INSTRUCTIONS
.Thank you for seeking care at [facility] Emergency Department. You have been seen and evaluated. We discussed the results of your workup   Please read the instructions provided   If given prescriptions, take as instructed    Remember, your care process does not end after your visit today. Please follow-up with your doctor within 1-2 days for a follow-up check to ensure you are  improving, to see if you need any further evaluation/testing, or to evaluate for any alternate diagnoses. Please return to the emergency department if you develop chest pain, difficulty breathing, inability to drink liquids without vomiting, one sided numbness or weakness, slurred speech, severe headache, or if you develop any other new or concerning symptoms as these could be signs of more serious medical illness. We hope you feel better.

## 2022-12-28 ENCOUNTER — PREP FOR CASE (OUTPATIENT)
Dept: SURGERY | Age: 82
End: 2022-12-28

## 2022-12-28 ENCOUNTER — HOSPITAL ENCOUNTER (OUTPATIENT)
Dept: LAB | Age: 82
Discharge: HOME | End: 2022-12-28
Attending: OTOLARYNGOLOGY

## 2022-12-28 DIAGNOSIS — Z01.812 PRE-PROCEDURAL LABORATORY EXAMINATIONS: ICD-10-CM

## 2022-12-28 PROCEDURE — U0003 INFECTIOUS AGENT DETECTION BY NUCLEIC ACID (DNA OR RNA); SEVERE ACUTE RESPIRATORY SYNDROME CORONAVIRUS 2 (SARS-COV-2) (CORONAVIRUS DISEASE [COVID-19]), AMPLIFIED PROBE TECHNIQUE, MAKING USE OF HIGH THROUGHPUT TECHNOLOGIES AS DESCRIBED BY CMS-2020-01-R: HCPCS | Performed by: OTOLARYNGOLOGY

## 2022-12-28 RX ORDER — ACETAMINOPHEN 325 MG/1
1000 TABLET ORAL
Status: CANCELLED | OUTPATIENT
Start: 2022-12-28

## 2022-12-28 RX ORDER — 0.9 % SODIUM CHLORIDE 0.9 %
2 VIAL (ML) INJECTION EVERY 12 HOURS SCHEDULED
Status: CANCELLED | OUTPATIENT
Start: 2022-12-28

## 2022-12-29 ENCOUNTER — ANESTHESIA EVENT (OUTPATIENT)
Dept: SURGERY | Age: 82
End: 2022-12-29

## 2022-12-29 LAB
SARS-COV-2 RNA RESP QL NAA+PROBE: NOT DETECTED
SERVICE CMNT-IMP: NORMAL
SERVICE CMNT-IMP: NORMAL

## 2022-12-29 RX ORDER — ONDANSETRON 4 MG/1
4 TABLET, ORALLY DISINTEGRATING ORAL DAILY
COMMUNITY

## 2022-12-29 RX ORDER — SOLIFENACIN SUCCINATE 10 MG/1
10 TABLET, FILM COATED ORAL DAILY
COMMUNITY

## 2022-12-29 RX ORDER — LEVOTHYROXINE SODIUM 0.03 MG/1
25 TABLET ORAL DAILY
COMMUNITY

## 2022-12-29 RX ORDER — GLYCOPYRROLATE 1 MG/1
1 TABLET ORAL 2 TIMES DAILY
COMMUNITY

## 2022-12-29 RX ORDER — BUSPIRONE HYDROCHLORIDE 10 MG/1
10 TABLET ORAL 3 TIMES DAILY
COMMUNITY

## 2022-12-29 RX ORDER — AMOXICILLIN AND CLAVULANATE POTASSIUM 250; 62.5 MG/5ML; MG/5ML
POWDER, FOR SUSPENSION ORAL 2 TIMES DAILY
COMMUNITY

## 2022-12-29 RX ORDER — ALPRAZOLAM 0.5 MG/1
0.25 TABLET ORAL 3 TIMES DAILY
COMMUNITY

## 2022-12-29 ASSESSMENT — ACTIVITIES OF DAILY LIVING (ADL)
ADL_BEFORE_ADMISSION: NEEDS/REQUIRES ASSISTANCE
DRESSING: INDEPENDENT
TOILETING: INDEPENDENT
BATHING: INDEPENDENT
TRANSFERRING: INDEPENDENT
FEEDING: NEEDS ASSISTANCE
ADL_SCORE: 11
SENSORY_SUPPORT_DEVICES: EYEGLASSES;HEARING AIDS
CONTINENCE: INDEPENDENT

## 2022-12-29 ASSESSMENT — PATIENT HEALTH QUESTIONNAIRE - PHQ9
SUM OF ALL RESPONSES TO PHQ9 QUESTIONS 1 AND 2: 0
2. FEELING DOWN, DEPRESSED OR HOPELESS: NOT AT ALL
SUM OF ALL RESPONSES TO PHQ9 QUESTIONS 1 AND 2: 0
CLINICAL INTERPRETATION OF PHQ2 SCORE: NO FURTHER SCREENING NEEDED
1. LITTLE INTEREST OR PLEASURE IN DOING THINGS: NOT AT ALL

## 2022-12-30 ENCOUNTER — HOSPITAL ENCOUNTER (OUTPATIENT)
Age: 82
Discharge: HOME | End: 2022-12-30
Attending: OTOLARYNGOLOGY | Admitting: OTOLARYNGOLOGY

## 2022-12-30 ENCOUNTER — ANESTHESIA (OUTPATIENT)
Dept: SURGERY | Age: 82
End: 2022-12-30

## 2022-12-30 ENCOUNTER — OFFICE VISIT (OUTPATIENT)
Dept: CARDIOLOGY | Age: 82
End: 2022-12-30

## 2022-12-30 VITALS
WEIGHT: 164 LBS | SYSTOLIC BLOOD PRESSURE: 127 MMHG | DIASTOLIC BLOOD PRESSURE: 74 MMHG | BODY MASS INDEX: 28 KG/M2 | HEART RATE: 69 BPM | RESPIRATION RATE: 16 BRPM | HEIGHT: 64 IN

## 2022-12-30 DIAGNOSIS — Z01.812 PRE-PROCEDURAL LABORATORY EXAMINATIONS: Primary | ICD-10-CM

## 2022-12-30 PROCEDURE — 13000038 HB MAJOR COMPLEX CASE S/U + 1ST 15 MIN: Performed by: OTOLARYNGOLOGY

## 2022-12-30 PROCEDURE — 13000001 HB PHASE II RECOVERY EA 30 MINUTES: Performed by: OTOLARYNGOLOGY

## 2022-12-30 PROCEDURE — 13000002 HB ANESTHESIA  GENERAL  S/U + 1ST 15 MIN: Performed by: OTOLARYNGOLOGY

## 2022-12-30 PROCEDURE — 10004180 HB COUNTER-TRANSPORT

## 2022-12-30 PROCEDURE — 10002807 HB RX 258: Performed by: ANESTHESIOLOGY

## 2022-12-30 PROCEDURE — 10002801 HB RX 250 W/O HCPCS: Performed by: ANESTHESIOLOGY

## 2022-12-30 PROCEDURE — 10004651 HB RX, NO CHARGE ITEM: Performed by: OTOLARYNGOLOGY

## 2022-12-30 PROCEDURE — 10002800 HB RX 250 W HCPCS: Performed by: ANESTHESIOLOGY

## 2022-12-30 PROCEDURE — 99204 OFFICE O/P NEW MOD 45 MIN: CPT | Performed by: INTERNAL MEDICINE

## 2022-12-30 PROCEDURE — 10004452 HB PACU ADDL 30 MINUTES: Performed by: OTOLARYNGOLOGY

## 2022-12-30 PROCEDURE — 88305 TISSUE EXAM BY PATHOLOGIST: CPT | Performed by: OTOLARYNGOLOGY

## 2022-12-30 PROCEDURE — 10002803 HB RX 637: Performed by: ANESTHESIOLOGY

## 2022-12-30 PROCEDURE — 13000039 HB MAJOR COMPLEX CASE EA ADD MINUTE: Performed by: OTOLARYNGOLOGY

## 2022-12-30 PROCEDURE — 13000003 HB ANESTHESIA  GENERAL EA ADD MINUTE: Performed by: OTOLARYNGOLOGY

## 2022-12-30 PROCEDURE — 10004451 HB PACU RECOVERY 1ST 30 MINUTES: Performed by: OTOLARYNGOLOGY

## 2022-12-30 RX ORDER — METOPROLOL SUCCINATE 25 MG/1
25 TABLET, EXTENDED RELEASE ORAL
Status: DISCONTINUED | OUTPATIENT
Start: 2022-12-30 | End: 2022-12-30 | Stop reason: HOSPADM

## 2022-12-30 RX ORDER — 0.9 % SODIUM CHLORIDE 0.9 %
2 VIAL (ML) INJECTION EVERY 12 HOURS SCHEDULED
Status: DISCONTINUED | OUTPATIENT
Start: 2022-12-30 | End: 2022-12-30 | Stop reason: HOSPADM

## 2022-12-30 RX ORDER — ACETAMINOPHEN 500 MG
1000 TABLET ORAL
Status: COMPLETED | OUTPATIENT
Start: 2022-12-30 | End: 2022-12-30

## 2022-12-30 RX ORDER — HUMAN INSULIN 100 [IU]/ML
INJECTION, SOLUTION SUBCUTANEOUS
Status: DISCONTINUED | OUTPATIENT
Start: 2022-12-30 | End: 2022-12-30 | Stop reason: HOSPADM

## 2022-12-30 RX ORDER — PROPOFOL 10 MG/ML
INJECTION, EMULSION INTRAVENOUS PRN
Status: DISCONTINUED | OUTPATIENT
Start: 2022-12-30 | End: 2022-12-30

## 2022-12-30 RX ORDER — ROCURONIUM BROMIDE 10 MG/ML
INJECTION, SOLUTION INTRAVENOUS PRN
Status: DISCONTINUED | OUTPATIENT
Start: 2022-12-30 | End: 2022-12-30

## 2022-12-30 RX ORDER — LIDOCAINE HYDROCHLORIDE 10 MG/ML
5-10 INJECTION, SOLUTION EPIDURAL; INFILTRATION; INTRACAUDAL; PERINEURAL PRN
Status: DISCONTINUED | OUTPATIENT
Start: 2022-12-30 | End: 2022-12-30 | Stop reason: HOSPADM

## 2022-12-30 RX ORDER — MIDAZOLAM HYDROCHLORIDE 1 MG/ML
2 INJECTION, SOLUTION INTRAMUSCULAR; INTRAVENOUS
Status: DISCONTINUED | OUTPATIENT
Start: 2022-12-30 | End: 2022-12-30 | Stop reason: HOSPADM

## 2022-12-30 RX ORDER — SCOLOPAMINE TRANSDERMAL SYSTEM 1 MG/1
1 PATCH, EXTENDED RELEASE TRANSDERMAL
Status: DISCONTINUED | OUTPATIENT
Start: 2022-12-30 | End: 2022-12-30 | Stop reason: HOSPADM

## 2022-12-30 RX ORDER — NALBUPHINE HYDROCHLORIDE 10 MG/ML
2.5 INJECTION, SOLUTION INTRAMUSCULAR; INTRAVENOUS; SUBCUTANEOUS
Status: CANCELLED | OUTPATIENT
Start: 2022-12-30

## 2022-12-30 RX ORDER — METOCLOPRAMIDE HYDROCHLORIDE 5 MG/ML
5 INJECTION INTRAMUSCULAR; INTRAVENOUS
Status: CANCELLED | OUTPATIENT
Start: 2022-12-30 | End: 2022-12-30

## 2022-12-30 RX ORDER — SODIUM CHLORIDE, SODIUM LACTATE, POTASSIUM CHLORIDE, CALCIUM CHLORIDE 600; 310; 30; 20 MG/100ML; MG/100ML; MG/100ML; MG/100ML
INJECTION, SOLUTION INTRAVENOUS CONTINUOUS
Status: DISCONTINUED | OUTPATIENT
Start: 2022-12-30 | End: 2022-12-30 | Stop reason: HOSPADM

## 2022-12-30 RX ORDER — GLYCOPYRROLATE 0.2 MG/ML
INJECTION, SOLUTION INTRAMUSCULAR; INTRAVENOUS PRN
Status: DISCONTINUED | OUTPATIENT
Start: 2022-12-30 | End: 2022-12-30

## 2022-12-30 RX ORDER — MIDAZOLAM HYDROCHLORIDE 1 MG/ML
INJECTION, SOLUTION INTRAMUSCULAR; INTRAVENOUS PRN
Status: DISCONTINUED | OUTPATIENT
Start: 2022-12-30 | End: 2022-12-30

## 2022-12-30 RX ORDER — NEOSTIGMINE METHYLSULFATE 1 MG/ML
INJECTION, SOLUTION INTRAVENOUS PRN
Status: DISCONTINUED | OUTPATIENT
Start: 2022-12-30 | End: 2022-12-30

## 2022-12-30 RX ORDER — HYDRALAZINE HYDROCHLORIDE 20 MG/ML
5 INJECTION INTRAMUSCULAR; INTRAVENOUS EVERY 10 MIN PRN
Status: CANCELLED | OUTPATIENT
Start: 2022-12-30

## 2022-12-30 RX ORDER — FAMOTIDINE 20 MG/1
20 TABLET, FILM COATED ORAL
Status: COMPLETED | OUTPATIENT
Start: 2022-12-30 | End: 2022-12-30

## 2022-12-30 RX ORDER — NALOXONE HCL 0.4 MG/ML
0.2 VIAL (ML) INJECTION EVERY 5 MIN PRN
Status: CANCELLED | OUTPATIENT
Start: 2022-12-30

## 2022-12-30 RX ORDER — ONDANSETRON 2 MG/ML
4 INJECTION INTRAMUSCULAR; INTRAVENOUS
Status: CANCELLED | OUTPATIENT
Start: 2022-12-30 | End: 2022-12-30

## 2022-12-30 RX ORDER — SODIUM CHLORIDE, SODIUM LACTATE, POTASSIUM CHLORIDE, CALCIUM CHLORIDE 600; 310; 30; 20 MG/100ML; MG/100ML; MG/100ML; MG/100ML
INJECTION, SOLUTION INTRAVENOUS CONTINUOUS
Status: CANCELLED | OUTPATIENT
Start: 2022-12-30

## 2022-12-30 RX ORDER — ONDANSETRON 2 MG/ML
INJECTION INTRAMUSCULAR; INTRAVENOUS PRN
Status: DISCONTINUED | OUTPATIENT
Start: 2022-12-30 | End: 2022-12-30

## 2022-12-30 RX ORDER — DEXAMETHASONE SODIUM PHOSPHATE 4 MG/ML
INJECTION, SOLUTION INTRA-ARTICULAR; INTRALESIONAL; INTRAMUSCULAR; INTRAVENOUS; SOFT TISSUE PRN
Status: DISCONTINUED | OUTPATIENT
Start: 2022-12-30 | End: 2022-12-30

## 2022-12-30 RX ORDER — HYDRALAZINE HYDROCHLORIDE 20 MG/ML
INJECTION INTRAMUSCULAR; INTRAVENOUS PRN
Status: DISCONTINUED | OUTPATIENT
Start: 2022-12-30 | End: 2022-12-30

## 2022-12-30 RX ADMIN — FENTANYL CITRATE 100 MCG: 50 INJECTION, SOLUTION INTRAMUSCULAR; INTRAVENOUS at 11:58

## 2022-12-30 RX ADMIN — HYDRALAZINE HYDROCHLORIDE 10 MG: 20 INJECTION INTRAMUSCULAR; INTRAVENOUS at 12:17

## 2022-12-30 RX ADMIN — ACETAMINOPHEN 1000 MG: 500 TABLET ORAL at 09:44

## 2022-12-30 RX ADMIN — DEXAMETHASONE SODIUM PHOSPHATE 8 MG: 4 INJECTION, SOLUTION INTRAMUSCULAR; INTRAVENOUS at 12:13

## 2022-12-30 RX ADMIN — ROCURONIUM BROMIDE 50 MG: 10 INJECTION, SOLUTION INTRAVENOUS at 12:01

## 2022-12-30 RX ADMIN — PROPOFOL 150 MG: 10 INJECTION, EMULSION INTRAVENOUS at 12:01

## 2022-12-30 RX ADMIN — REMIFENTANIL HYDROCHLORIDE 0.1 MCG/KG/MIN: 1 INJECTION, POWDER, LYOPHILIZED, FOR SOLUTION INTRAVENOUS at 12:00

## 2022-12-30 RX ADMIN — GLYCOPYRROLATE 0.6 MG: 0.2 INJECTION, SOLUTION INTRAMUSCULAR; INTRAVENOUS at 13:03

## 2022-12-30 RX ADMIN — SODIUM CHLORIDE, POTASSIUM CHLORIDE, SODIUM LACTATE AND CALCIUM CHLORIDE: 600; 310; 30; 20 INJECTION, SOLUTION INTRAVENOUS at 09:58

## 2022-12-30 RX ADMIN — MIDAZOLAM HYDROCHLORIDE 2 MG: 1 INJECTION, SOLUTION INTRAMUSCULAR; INTRAVENOUS at 11:56

## 2022-12-30 RX ADMIN — NEOSTIGMINE METHYLSULFATE 3 MG: 1 INJECTION INTRAVENOUS at 13:03

## 2022-12-30 RX ADMIN — FAMOTIDINE 20 MG: 20 TABLET ORAL at 09:44

## 2022-12-30 RX ADMIN — SCOPALAMINE 1 PATCH: 1 PATCH, EXTENDED RELEASE TRANSDERMAL at 09:44

## 2022-12-30 RX ADMIN — ONDANSETRON 4 MG: 2 INJECTION INTRAMUSCULAR; INTRAVENOUS at 12:13

## 2022-12-30 SDOH — HEALTH STABILITY: PHYSICAL HEALTH: ON AVERAGE, HOW MANY DAYS PER WEEK DO YOU ENGAGE IN MODERATE TO STRENUOUS EXERCISE (LIKE A BRISK WALK)?: 2 DAYS

## 2022-12-30 ASSESSMENT — PAIN SCALES - GENERAL
PAINLEVEL_OUTOF10: 3
PAINLEVEL_OUTOF10: 2
PAINLEVEL_OUTOF10: 3
PAINLEVEL_OUTOF10: 2
PAINLEVEL_OUTOF10: 0
PAINLEVEL_OUTOF10: 0

## 2022-12-31 VITALS
HEART RATE: 94 BPM | WEIGHT: 164.46 LBS | HEIGHT: 64 IN | SYSTOLIC BLOOD PRESSURE: 118 MMHG | BODY MASS INDEX: 28.08 KG/M2 | RESPIRATION RATE: 20 BRPM | DIASTOLIC BLOOD PRESSURE: 56 MMHG | OXYGEN SATURATION: 95 % | TEMPERATURE: 97.5 F

## 2023-01-02 LAB
ASR DISCLAIMER: NORMAL
CASE RPRT: NORMAL
CLINICAL INFO: NORMAL
PATH REPORT.FINAL DX SPEC: NORMAL
PATH REPORT.GROSS SPEC: NORMAL

## 2023-04-27 NOTE — PLAN OF CARE
Problem: RESPIRATORY - ADULT  Goal: Achieves optimal ventilation and oxygenation  Description: INTERVENTIONS:  - Assess for changes in respiratory status  - Assess for changes in mentation and behavior  - Position to facilitate oxygenation and minimize r for standing, transferring and ambulating w/ or w/o assistive devices  - Assist with transfers and ambulation using safe patient handling equipment as needed  - Ensure adequate protection for wounds/incisions during mobilization  - Obtain PT/OT consults as Migraine variant with headache

## 2023-06-14 ENCOUNTER — APPOINTMENT (OUTPATIENT)
Dept: GENERAL RADIOLOGY | Facility: HOSPITAL | Age: 83
End: 2023-06-14
Attending: EMERGENCY MEDICINE
Payer: MEDICARE

## 2023-06-14 ENCOUNTER — HOSPITAL ENCOUNTER (EMERGENCY)
Facility: HOSPITAL | Age: 83
Discharge: HOME | End: 2023-06-14
Attending: EMERGENCY MEDICINE
Payer: MEDICARE

## 2023-06-14 ENCOUNTER — TELEPHONE (OUTPATIENT)
Dept: SURGERY | Facility: CLINIC | Age: 83
End: 2023-06-14

## 2023-06-14 VITALS
BODY MASS INDEX: 29 KG/M2 | DIASTOLIC BLOOD PRESSURE: 46 MMHG | WEIGHT: 167 LBS | HEART RATE: 75 BPM | TEMPERATURE: 98 F | SYSTOLIC BLOOD PRESSURE: 125 MMHG | RESPIRATION RATE: 18 BRPM | OXYGEN SATURATION: 100 %

## 2023-06-14 DIAGNOSIS — K94.23 GASTROSTOMY MALFUNCTION (HCC): Primary | ICD-10-CM

## 2023-06-14 PROCEDURE — 43762 RPLC GTUBE NO REVJ TRC: CPT

## 2023-06-14 PROCEDURE — 99284 EMERGENCY DEPT VISIT MOD MDM: CPT

## 2023-06-14 PROCEDURE — 74021 RADEX ABDOMEN 3+ VIEWS: CPT | Performed by: EMERGENCY MEDICINE

## 2023-06-14 PROCEDURE — 99283 EMERGENCY DEPT VISIT LOW MDM: CPT

## 2023-06-14 NOTE — ED INITIAL ASSESSMENT (HPI)
Patient complains of hearing a \"pop\" around feeding tube yesterday, states there was bleeding around opening and yellow drainage, states after cleaning it, it looked ok, was able to use it after incident, received meds this antemeridian, states it began to get painful today, tube was placed 12/9/2022

## 2023-11-22 ENCOUNTER — HOSPITAL ENCOUNTER (EMERGENCY)
Facility: HOSPITAL | Age: 83
Discharge: HOME | End: 2023-11-22
Attending: EMERGENCY MEDICINE
Payer: MEDICARE

## 2023-11-22 ENCOUNTER — APPOINTMENT (OUTPATIENT)
Dept: GENERAL RADIOLOGY | Facility: HOSPITAL | Age: 83
End: 2023-11-22
Attending: EMERGENCY MEDICINE
Payer: MEDICARE

## 2023-11-22 VITALS
HEART RATE: 70 BPM | TEMPERATURE: 99 F | OXYGEN SATURATION: 99 % | RESPIRATION RATE: 20 BRPM | HEIGHT: 64 IN | BODY MASS INDEX: 27.49 KG/M2 | WEIGHT: 161 LBS | DIASTOLIC BLOOD PRESSURE: 66 MMHG | SYSTOLIC BLOOD PRESSURE: 141 MMHG

## 2023-11-22 DIAGNOSIS — T85.528A DISLODGED GASTROSTOMY TUBE: ICD-10-CM

## 2023-11-22 DIAGNOSIS — Z09 STATUS POST GASTROSTOMY TUBE PLACEMENT, FOLLOW-UP EXAM: ICD-10-CM

## 2023-11-22 PROCEDURE — 99283 EMERGENCY DEPT VISIT LOW MDM: CPT

## 2023-11-22 PROCEDURE — 43762 RPLC GTUBE NO REVJ TRC: CPT

## 2023-11-22 PROCEDURE — 74021 RADEX ABDOMEN 3+ VIEWS: CPT | Performed by: EMERGENCY MEDICINE

## 2023-11-22 NOTE — DISCHARGE INSTRUCTIONS
Please return to the emergency department for any worsening symptoms including but not limited to fevers of 100.4, abdominal pain, decreased desire to eat, weakness, numbness, losing stool/urine on yourself, blood in vomit/stool, chest pain, etc. Please follow with your primary care provider in the next few days. The Emergency Department is not intended to be a substitute for an effort to provide complete medical care. The imaging, if any, have often been interpreted on a preliminary basis pending final reading by the radiologist. If your blood pressure was greater than 140/90, please have this blood pressure rechecked by your primary care provider wioscarin the next few days. You will benefit from a further discussion of lifestyle modifications that include Weight Reduction - Dietary Sodium Restriction - Increased Physical Activity and Moderation in alcohol (ETOH) Consumption. If possible check your pressure at home and keep a blood pressure log to bring to your physician.

## 2023-11-22 NOTE — ED QUICK NOTES
Patient and family member provided with discharge instructions. Verbalized understanding for plan of care at home and follow up. All questions/ concerns addressed prior to discharge.

## 2023-11-22 NOTE — ED INITIAL ASSESSMENT (HPI)
Patient arrives via wheelchair through triage with complaints of displaced g-tube; patient accidentally got it stuck in her waistband. Tube has been out for 40-45min.

## 2024-02-02 ENCOUNTER — PREP FOR CASE (OUTPATIENT)
Dept: SURGERY | Age: 84
End: 2024-02-02

## 2024-02-02 RX ORDER — 0.9 % SODIUM CHLORIDE 0.9 %
2 VIAL (ML) INJECTION EVERY 12 HOURS SCHEDULED
OUTPATIENT
Start: 2024-02-02

## 2024-02-02 RX ORDER — ACETAMINOPHEN 325 MG/1
1000 TABLET ORAL
OUTPATIENT
Start: 2024-02-02

## 2024-02-05 ENCOUNTER — ANESTHESIA EVENT (OUTPATIENT)
Dept: SURGERY | Age: 84
End: 2024-02-05

## 2024-02-05 RX ORDER — MULTIVITAMIN WITH IRON
500 TABLET ORAL DAILY
COMMUNITY

## 2024-02-05 RX ORDER — VIT C/B6/B5/MAGNESIUM/HERB 173 50-5-6-5MG
1 CAPSULE ORAL DAILY
COMMUNITY

## 2024-02-05 ASSESSMENT — ACTIVITIES OF DAILY LIVING (ADL)
SENSORY_SUPPORT_DEVICES: EYEGLASSES;HEARING AIDS
ADL_BEFORE_ADMISSION: INDEPENDENT
MOBILITY_ASSIST_DEVICES: CANE;STANDARD WALKER
ADL_SCORE: 12

## 2024-02-06 ENCOUNTER — ANESTHESIA (OUTPATIENT)
Dept: SURGERY | Age: 84
End: 2024-02-06

## 2024-02-06 ENCOUNTER — HOSPITAL ENCOUNTER (OUTPATIENT)
Age: 84
Discharge: HOME | End: 2024-02-06
Attending: OTOLARYNGOLOGY | Admitting: OTOLARYNGOLOGY

## 2024-02-06 PROCEDURE — 10004451 HB PACU RECOVERY 1ST 30 MINUTES: Performed by: OTOLARYNGOLOGY

## 2024-02-06 PROCEDURE — 10002800 HB RX 250 W HCPCS: Performed by: OTOLARYNGOLOGY

## 2024-02-06 PROCEDURE — 10002803 HB RX 637

## 2024-02-06 PROCEDURE — 13000001 HB PHASE II RECOVERY EA 30 MINUTES: Performed by: OTOLARYNGOLOGY

## 2024-02-06 PROCEDURE — 13000039 HB MAJOR COMPLEX CASE EA ADD MINUTE: Performed by: OTOLARYNGOLOGY

## 2024-02-06 PROCEDURE — 10006023 HB SUPPLY 272: Performed by: OTOLARYNGOLOGY

## 2024-02-06 PROCEDURE — 13000002 HB ANESTHESIA  GENERAL  S/U + 1ST 15 MIN: Performed by: OTOLARYNGOLOGY

## 2024-02-06 PROCEDURE — 10002807 HB RX 258: Performed by: NURSE ANESTHETIST, CERTIFIED REGISTERED

## 2024-02-06 PROCEDURE — 10002801 HB RX 250 W/O HCPCS: Performed by: ANESTHESIOLOGY

## 2024-02-06 PROCEDURE — 10002807 HB RX 258

## 2024-02-06 PROCEDURE — 13000003 HB ANESTHESIA  GENERAL EA ADD MINUTE: Performed by: OTOLARYNGOLOGY

## 2024-02-06 PROCEDURE — 10002801 HB RX 250 W/O HCPCS

## 2024-02-06 PROCEDURE — 10002800 HB RX 250 W HCPCS: Performed by: NURSE ANESTHETIST, CERTIFIED REGISTERED

## 2024-02-06 PROCEDURE — 10004452 HB PACU ADDL 30 MINUTES: Performed by: OTOLARYNGOLOGY

## 2024-02-06 PROCEDURE — 10004651 HB RX, NO CHARGE ITEM: Performed by: OTOLARYNGOLOGY

## 2024-02-06 PROCEDURE — C1726 CATH, BAL DIL, NON-VASCULAR: HCPCS | Performed by: OTOLARYNGOLOGY

## 2024-02-06 PROCEDURE — 10002801 HB RX 250 W/O HCPCS: Performed by: NURSE ANESTHETIST, CERTIFIED REGISTERED

## 2024-02-06 PROCEDURE — 13000038 HB MAJOR COMPLEX CASE S/U + 1ST 15 MIN: Performed by: OTOLARYNGOLOGY

## 2024-02-06 RX ORDER — NICOTINE POLACRILEX 4 MG
30 LOZENGE BUCCAL
Status: DISCONTINUED | OUTPATIENT
Start: 2024-02-06 | End: 2024-02-06 | Stop reason: HOSPADM

## 2024-02-06 RX ORDER — LIDOCAINE HYDROCHLORIDE 10 MG/ML
5-10 INJECTION, SOLUTION EPIDURAL; INFILTRATION; INTRACAUDAL; PERINEURAL PRN
Status: DISCONTINUED | OUTPATIENT
Start: 2024-02-06 | End: 2024-02-06 | Stop reason: HOSPADM

## 2024-02-06 RX ORDER — TRIAMCINOLONE ACETONIDE 40 MG/ML
INJECTION, SUSPENSION INTRA-ARTICULAR; INTRAMUSCULAR PRN
Status: DISCONTINUED | OUTPATIENT
Start: 2024-02-06 | End: 2024-02-06 | Stop reason: HOSPADM

## 2024-02-06 RX ORDER — 0.9 % SODIUM CHLORIDE 0.9 %
2 VIAL (ML) INJECTION EVERY 12 HOURS SCHEDULED
Status: DISCONTINUED | OUTPATIENT
Start: 2024-02-06 | End: 2024-02-06 | Stop reason: HOSPADM

## 2024-02-06 RX ORDER — ACETAMINOPHEN 500 MG
1000 TABLET ORAL
Status: COMPLETED | OUTPATIENT
Start: 2024-02-06 | End: 2024-02-06

## 2024-02-06 RX ORDER — HYDRALAZINE HYDROCHLORIDE 20 MG/ML
5 INJECTION INTRAMUSCULAR; INTRAVENOUS EVERY 10 MIN PRN
Status: DISCONTINUED | OUTPATIENT
Start: 2024-02-06 | End: 2024-02-06 | Stop reason: HOSPADM

## 2024-02-06 RX ORDER — DEXAMETHASONE SODIUM PHOSPHATE 4 MG/ML
INJECTION, SOLUTION INTRA-ARTICULAR; INTRALESIONAL; INTRAMUSCULAR; INTRAVENOUS; SOFT TISSUE PRN
Status: DISCONTINUED | OUTPATIENT
Start: 2024-02-06 | End: 2024-02-06

## 2024-02-06 RX ORDER — SCOLOPAMINE TRANSDERMAL SYSTEM 1 MG/1
1 PATCH, EXTENDED RELEASE TRANSDERMAL PRN
Status: DISCONTINUED | OUTPATIENT
Start: 2024-02-06 | End: 2024-02-06 | Stop reason: HOSPADM

## 2024-02-06 RX ORDER — HUMAN INSULIN 100 [IU]/ML
INJECTION, SOLUTION SUBCUTANEOUS
Status: DISCONTINUED | OUTPATIENT
Start: 2024-02-06 | End: 2024-02-06 | Stop reason: HOSPADM

## 2024-02-06 RX ORDER — PROPOFOL 10 MG/ML
INJECTION, EMULSION INTRAVENOUS PRN
Status: DISCONTINUED | OUTPATIENT
Start: 2024-02-06 | End: 2024-02-06

## 2024-02-06 RX ORDER — SODIUM CHLORIDE, SODIUM LACTATE, POTASSIUM CHLORIDE, CALCIUM CHLORIDE 600; 310; 30; 20 MG/100ML; MG/100ML; MG/100ML; MG/100ML
INJECTION, SOLUTION INTRAVENOUS CONTINUOUS PRN
Status: DISCONTINUED | OUTPATIENT
Start: 2024-02-06 | End: 2024-02-06

## 2024-02-06 RX ORDER — GLYCOPYRROLATE 0.2 MG/ML
INJECTION, SOLUTION INTRAMUSCULAR; INTRAVENOUS PRN
Status: DISCONTINUED | OUTPATIENT
Start: 2024-02-06 | End: 2024-02-06

## 2024-02-06 RX ORDER — ROCURONIUM BROMIDE 10 MG/ML
INJECTION, SOLUTION INTRAVENOUS PRN
Status: DISCONTINUED | OUTPATIENT
Start: 2024-02-06 | End: 2024-02-06

## 2024-02-06 RX ORDER — SODIUM CHLORIDE, SODIUM LACTATE, POTASSIUM CHLORIDE, CALCIUM CHLORIDE 600; 310; 30; 20 MG/100ML; MG/100ML; MG/100ML; MG/100ML
INJECTION, SOLUTION INTRAVENOUS CONTINUOUS
Status: DISCONTINUED | OUTPATIENT
Start: 2024-02-06 | End: 2024-02-06 | Stop reason: HOSPADM

## 2024-02-06 RX ORDER — NEOSTIGMINE METHYLSULFATE 4 MG/4 ML
SYRINGE (ML) INTRAVENOUS PRN
Status: DISCONTINUED | OUTPATIENT
Start: 2024-02-06 | End: 2024-02-06

## 2024-02-06 RX ORDER — FAMOTIDINE 20 MG/1
20 TABLET, FILM COATED ORAL
Status: COMPLETED | OUTPATIENT
Start: 2024-02-06 | End: 2024-02-06

## 2024-02-06 RX ORDER — DEXTROSE MONOHYDRATE 25 G/50ML
25 INJECTION, SOLUTION INTRAVENOUS PRN
Status: DISCONTINUED | OUTPATIENT
Start: 2024-02-06 | End: 2024-02-06 | Stop reason: HOSPADM

## 2024-02-06 RX ORDER — ONDANSETRON 2 MG/ML
INJECTION INTRAMUSCULAR; INTRAVENOUS PRN
Status: DISCONTINUED | OUTPATIENT
Start: 2024-02-06 | End: 2024-02-06

## 2024-02-06 RX ADMIN — FENTANYL CITRATE 25 MCG: 50 INJECTION INTRAMUSCULAR; INTRAVENOUS at 09:48

## 2024-02-06 RX ADMIN — PROPOFOL 50 MG: 10 INJECTION, EMULSION INTRAVENOUS at 07:40

## 2024-02-06 RX ADMIN — ONDANSETRON 4 MG: 2 INJECTION INTRAMUSCULAR; INTRAVENOUS at 08:23

## 2024-02-06 RX ADMIN — FENTANYL CITRATE 25 MCG: 50 INJECTION INTRAMUSCULAR; INTRAVENOUS at 07:58

## 2024-02-06 RX ADMIN — FAMOTIDINE 20 MG: 20 TABLET ORAL at 06:54

## 2024-02-06 RX ADMIN — SODIUM CHLORIDE, POTASSIUM CHLORIDE, SODIUM LACTATE AND CALCIUM CHLORIDE: 600; 310; 30; 20 INJECTION, SOLUTION INTRAVENOUS at 07:15

## 2024-02-06 RX ADMIN — PHENYLEPHRINE HYDROCHLORIDE 10 MCG/MIN: 10 INJECTION INTRAVENOUS at 07:45

## 2024-02-06 RX ADMIN — DEXAMETHASONE SODIUM PHOSPHATE 4 MG: 4 INJECTION INTRA-ARTICULAR; INTRALESIONAL; INTRAMUSCULAR; INTRAVENOUS; SOFT TISSUE at 08:09

## 2024-02-06 RX ADMIN — PROPOFOL 50 MG: 10 INJECTION, EMULSION INTRAVENOUS at 07:58

## 2024-02-06 RX ADMIN — SODIUM CHLORIDE, POTASSIUM CHLORIDE, SODIUM LACTATE AND CALCIUM CHLORIDE: 600; 310; 30; 20 INJECTION, SOLUTION INTRAVENOUS at 07:00

## 2024-02-06 RX ADMIN — ROCURONIUM BROMIDE 30 MG: 10 INJECTION INTRAVENOUS at 07:40

## 2024-02-06 RX ADMIN — Medication 3 MG: at 08:45

## 2024-02-06 RX ADMIN — FENTANYL CITRATE 25 MCG: 50 INJECTION INTRAMUSCULAR; INTRAVENOUS at 10:08

## 2024-02-06 RX ADMIN — PROPOFOL INJECTABLE EMULSION 20 MCG/KG/MIN: 10 INJECTION, EMULSION INTRAVENOUS at 08:10

## 2024-02-06 RX ADMIN — GLYCOPYRROLATE 0.4 MG: 0.2 INJECTION, SOLUTION INTRAMUSCULAR; INTRAVENOUS at 08:45

## 2024-02-06 RX ADMIN — ACETAMINOPHEN 1000 MG: 500 TABLET ORAL at 06:54

## 2024-02-06 RX ADMIN — FENTANYL CITRATE 50 MCG: 50 INJECTION INTRAMUSCULAR; INTRAVENOUS at 07:38

## 2024-02-06 ASSESSMENT — PAIN DESCRIPTION - PAIN TYPE
TYPE: ACUTE PAIN

## 2024-02-06 ASSESSMENT — PAIN SCALES - GENERAL
PAINLEVEL_OUTOF10: 4
PAINLEVEL_OUTOF10: 0
PAINLEVEL_OUTOF10: 3
PAINLEVEL_OUTOF10: 5
PAINLEVEL_OUTOF10: 0
PAINLEVEL_OUTOF10: 5
PAINLEVEL_OUTOF10: 0
PAINLEVEL_OUTOF10: 3
PAINLEVEL_OUTOF10: 3
PAINLEVEL_OUTOF10: 0
PAINLEVEL_OUTOF10: 0

## 2024-02-07 VITALS
RESPIRATION RATE: 20 BRPM | HEIGHT: 64 IN | BODY MASS INDEX: 28.04 KG/M2 | OXYGEN SATURATION: 96 % | HEART RATE: 68 BPM | TEMPERATURE: 97.7 F | SYSTOLIC BLOOD PRESSURE: 130 MMHG | DIASTOLIC BLOOD PRESSURE: 61 MMHG | WEIGHT: 164.24 LBS

## 2024-02-07 NOTE — PLAN OF CARE
Problem: Patient Centered Care  Goal: Patient preferences are identified and integrated in the patient's plan of care  Description: Interventions:  - What would you like us to know as we care for you?  I live with my son and I had recent spine surgery her anxiety  - Monitor for signs/symptoms of CO2 retention  Outcome: Progressing     Problem: METABOLIC/FLUID AND ELECTROLYTES - ADULT  Goal: Glucose maintained within prescribed range  Description: INTERVENTIONS:  - Monitor Blood Glucose as ordered  - Assess INTERVENTIONS:  - Assess pt frequently for physical needs  - Identify cognitive and physical deficits and behaviors that affect risk of falls.   - [place] fall precautions as indicated by assessment.  - Educate pt/family on patient safety including physic [FreeTextEntry1] : Cardiac murmur - Stable; no further intervention at this time (see echo). Bruit/ Carotid artery disease - Stable; no further intervention at this time. Blood work drawn. Maintain a low caloric, low sodium, low cholesterol diet, Dietary counseling given, diet and exercise discussed in detail.

## 2024-04-04 PROCEDURE — 99283 EMERGENCY DEPT VISIT LOW MDM: CPT

## 2024-04-04 PROCEDURE — 43762 RPLC GTUBE NO REVJ TRC: CPT

## 2024-04-05 ENCOUNTER — HOSPITAL ENCOUNTER (EMERGENCY)
Facility: HOSPITAL | Age: 84
Discharge: HOME | End: 2024-04-05
Attending: EMERGENCY MEDICINE
Payer: MEDICARE

## 2024-04-05 ENCOUNTER — APPOINTMENT (OUTPATIENT)
Dept: GENERAL RADIOLOGY | Facility: HOSPITAL | Age: 84
End: 2024-04-05
Attending: EMERGENCY MEDICINE
Payer: MEDICARE

## 2024-04-05 VITALS
BODY MASS INDEX: 27.66 KG/M2 | WEIGHT: 162 LBS | RESPIRATION RATE: 19 BRPM | SYSTOLIC BLOOD PRESSURE: 147 MMHG | HEART RATE: 78 BPM | TEMPERATURE: 98 F | OXYGEN SATURATION: 97 % | DIASTOLIC BLOOD PRESSURE: 72 MMHG | HEIGHT: 64 IN

## 2024-04-05 DIAGNOSIS — T85.528A DISLODGED GASTROSTOMY TUBE: Primary | ICD-10-CM

## 2024-04-05 PROCEDURE — 74021 RADEX ABDOMEN 3+ VIEWS: CPT | Performed by: EMERGENCY MEDICINE

## 2024-04-05 NOTE — ED INITIAL ASSESSMENT (HPI)
Patient arrives with reports of G tube partially out. Per son, went to give night meds and noticed it was half out. Unable to push back in or give meds.     Denies fevers. Was told to go to ED to get G tube replaced.

## 2024-04-05 NOTE — ED PROVIDER NOTES
Patient Seen in: [facility] Emergency Department      History     Chief Complaint   Patient presents with    Cath Tube Problem     Stated Complaint: Cath Tube Problem    Subjective:   HPI    The patient is an 84-year-old female with a history of hypertension with chronic tracheostomy and gastrostomy feeding tube which came out earlier this evening.  Son was able to push it back in but is not sure how much it is in.  He thinks that the balloon was popped.  This current tube has been in since 2023.  Patient denies any abdominal pain or trouble with feeding.  No nausea or vomiting.  No fevers or chills    Objective:   Past Medical History:   Diagnosis Date    Anesthesia complication     Anxiety state, unspecified     with panic attacks    Disorder of thyroid     Esophageal reflux     Essential hypertension     Hearing impairment     bilateral hearing aids    Hematuria     High blood pressure     High cholesterol     Hypothyroidism     Lumbar spinal stenosis     Obesity     HOLA (obstructive sleep apnea) 5/3/17-PSG    AHI 38 RDI 61 REM AHI 54 Supine AHI 38 non-supine AHI 0 Sao2 Skip 65% autoPAP 6-16 HME    Other and unspecified hyperlipidemia     PONV (postoperative nausea and vomiting)     severe nausea and ill feeling for weeks after surgery    Problems with swallowing     Sleep apnea     has c-pap. not using presently    Tremor     Vitamin D insufficiency 3/6/2017              Past Surgical History:   Procedure Laterality Date    BACK SURGERY  10/05/2020    C5-7 cervical fusion with Dr. Alexis @ [facility]          CATARACT Bilateral     Dec 2018 (R), 2019 (L)    CHOLECYSTECTOMY  3/19/15     Laparoscopic by Dr. Bautista    COLONOSCOPY  6/3/14    colon polyp and EGD done too.    COLONOSCOPY  2019    EGD  2019    gastric polyp removed (benign)    EGD  2020    chronic inactive gastritis with intestinal metplasia, neg for H pylori    KNEE REPLACEMENT SURGERY  07    right     OTHER SURGICAL HISTORY  11/30/15    laminectomy L3-L4, L4-5; right L1-L2 lateral microdiscectomy                Social History     Socioeconomic History    Marital status:     Number of children: 3   Occupational History    Occupation: Retired    Tobacco Use    Smoking status: Never    Smokeless tobacco: Never   Vaping Use    Vaping Use: Never used   Substance and Sexual Activity    Alcohol use: No    Drug use: No              Review of Systems    Positive for stated complaint: Cath Tube Problem  Other systems are as noted in HPI.  Constitutional and vital signs reviewed.      All other systems reviewed and negative except as noted above.    Physical Exam     ED Triage Vitals [04/04/24 1735]   /81   Pulse 72   Resp 20   Temp 98.2 °F (36.8 °C)   Temp src Temporal   SpO2 99 %   O2 Device None (Room air)       Current:/81   Pulse 72   Temp 98.2 °F (36.8 °C) (Temporal)   Resp 20   Ht 162.6 cm (5' 4\")   Wt 73.5 kg   SpO2 99%   BMI 27.81 kg/m²         Physical Exam  Vitals and nursing note reviewed.   Constitutional:       General: She is not in acute distress.     Appearance: Normal appearance. She is not ill-appearing.   HENT:      Mouth/Throat:      Mouth: Mucous membranes are moist.   Cardiovascular:      Rate and Rhythm: Normal rate.   Pulmonary:      Effort: Pulmonary effort is normal.      Comments: Tracheostomy site clean dry and intact  Abdominal:      Palpations: Abdomen is soft.      Tenderness: There is no abdominal tenderness.      Comments: G-tube site clean dry and intact   Skin:     General: Skin is warm and dry.      Capillary Refill: Capillary refill takes less than 2 seconds.      Findings: No erythema.   Neurological:      General: No focal deficit present.      Mental Status: She is alert and oriented to person, place, and time.       Differential diagnosis includes malfunction of G-tube        ED Course   Labs Reviewed - No data to display         Procedure  Gastrostomy tube 18 Palestinian replaced easily after old tube was removed and balloon was clearly ruptured  Location confirmed with x-ray question of placement of the bulb but patient has no pain and tube easily repositioned with same location patient denies any pain or drainage after placement         MDM                                         Medical Decision Making  G-tube balloon rupture removed and replaced with new tube  Patient tolerated procedure well  Will follow-up with her doctor    Problems Addressed:  Dislodged gastrostomy tube: acute illness or injury    Amount and/or Complexity of Data Reviewed  Independent Historian: caregiver     Details: Son assisting with history  Radiology: ordered and independent interpretation performed.     Details: Contrast in appropriate location other results per radiologist        Disposition and Plan     Clinical Impression:  1. Dislodged gastrostomy tube         Disposition:  Discharge  4/5/2024  5:30 am    Follow-up:  Juan Uribe DO  [address] [id number] [phone]    Schedule an appointment as soon as possible for a visit      We recommend that you schedule follow up care with a primary care provider within the next three months to obtain basic health screening including reassessment of your blood pressure.      Medications Prescribed:  Current Discharge Medication List

## 2024-11-10 NOTE — ED QUICK NOTES
Jumana Martínez RN   Pulled sterile water from pyxis to inflate gtube balloon

## 2025-01-24 ENCOUNTER — HOSPITAL ENCOUNTER (EMERGENCY)
Facility: HOSPITAL | Age: 85
Discharge: HOME | End: 2025-01-24
Attending: EMERGENCY MEDICINE
Payer: MEDICARE

## 2025-01-24 ENCOUNTER — APPOINTMENT (OUTPATIENT)
Dept: GENERAL RADIOLOGY | Facility: HOSPITAL | Age: 85
End: 2025-01-24
Attending: EMERGENCY MEDICINE
Payer: MEDICARE

## 2025-01-24 VITALS
HEART RATE: 69 BPM | TEMPERATURE: 98 F | OXYGEN SATURATION: 99 % | DIASTOLIC BLOOD PRESSURE: 77 MMHG | SYSTOLIC BLOOD PRESSURE: 136 MMHG | RESPIRATION RATE: 20 BRPM

## 2025-01-24 DIAGNOSIS — T85.528A DISLODGED GASTROSTOMY TUBE: Primary | ICD-10-CM

## 2025-01-24 PROCEDURE — 43762 RPLC GTUBE NO REVJ TRC: CPT

## 2025-01-24 PROCEDURE — 99283 EMERGENCY DEPT VISIT LOW MDM: CPT

## 2025-01-24 PROCEDURE — 74021 RADEX ABDOMEN 3+ VIEWS: CPT | Performed by: EMERGENCY MEDICINE

## 2025-01-24 NOTE — ED INITIAL ASSESSMENT (HPI)
S: G-Tube accidentally removed. Pt brought extra tube.    B: see chart    A: unable to assess site in triage, per pt out for 1 hour.    R: edso

## 2025-01-24 NOTE — ED PROVIDER NOTES
Patient Seen in: [facility] Emergency Department      History     Chief Complaint   Patient presents with    Cath Tube Problem     Stated Complaint: needs G-tube replaced    Subjective:   HPI  History obtained by patient's son at bedside.  85-year-old female presents for G-tube replacement.  She felt that her G-tube balloon popped about an hour ago.  She uses an 18 Welsh G-tube.  The stoma was created in 2021.  No abdominal pain or vomiting.  No fevers.        Objective:     No pertinent past medical history.            No pertinent past surgical history.              No pertinent social history.                Physical Exam     ED Triage Vitals [01/24/25 1609]   /77   Pulse 69   Resp 20   Temp 98.4 °F (36.9 °C)   Temp src    SpO2 99 %   O2 Device None (Room air)       Current Vitals:   Vital Signs  BP: 136/77  Pulse: 69  Resp: 20  Temp: 98.4 °F (36.9 °C)    Oxygen Therapy  SpO2: 99 %  O2 Device: None (Room air)        Physical Exam  Vitals and nursing note reviewed.   Constitutional:       Appearance: She is well-developed.   HENT:      Head: Normocephalic and atraumatic.   Eyes:      Extraocular Movements: Extraocular movements intact.   Neck:      Comments: +tracheostomy  Cardiovascular:      Rate and Rhythm: Normal rate.   Pulmonary:      Effort: Pulmonary effort is normal.   Abdominal:      General: There is no distension.      Palpations: Abdomen is soft.      Tenderness: There is no abdominal tenderness.      Comments: L upper abdomen stoma present without surrounding erythema. No abd distension   Musculoskeletal:         General: Normal range of motion.      Cervical back: Normal range of motion.   Skin:     General: Skin is warm.   Neurological:      Mental Status: She is alert.      Comments: No focal deficits             ED Course   Labs Reviewed - No data to display         XR ABDOMEN 3 OR MORE VIEWS (CPT=74021)    Result Date: 1/24/2025  CONCLUSION:  1. G-tube tip in gastric antrum.   Contrast fills the stomach.  No extravasation or reflux..    Dictated by (CST): Jalen Bush MD on 1/24/2025 at 6:46 PM     Finalized by (CST): Jalen Bush MD on 1/24/2025 at 6:47 PM                MDM        G-Tube Placement/Replacement   Performed by: Bandar  Consent: Verbal consent obtained.  Risks and benefits: risks, benefits and alternatives were discussed  Consent given by: patient and/or guardian  Patient understanding: patient/guardian states understanding of the procedure being performed  Patient consent: the patient/guardian's understanding of the procedure matches consent given  Patient identity confirmed: arm band  Time out: Immediately prior to procedure a \"time out\" was called to verify the correct patient, procedure, equipment, support staff and site/side marked as required.  Location details: abdomen  Procedure: using gentle pressure, new g-tube 18F was placed with 7-10 ml in baloon  Results: KUB with contrast confirms proper placement  Complication: Tolerated well without complication.      Medical Decision Making  Abdomen soft. G tube replaced by myself as above. XR shows adequate placement. Pt discharged in stable condition.    Problems Addressed:  Dislodged gastrostomy tube: complicated acute illness or injury with systemic symptoms    Amount and/or Complexity of Data Reviewed  Radiology: ordered and independent interpretation performed. Decision-making details documented in ED Course.        Disposition and Plan     Clinical Impression:  1. Dislodged gastrostomy tube         Disposition:  Discharge  1/24/2025  6:51 pm    Follow-up:  your GI physician    Follow up      We recommend that you schedule follow up care with a primary care provider within the next three months to obtain basic health screening including reassessment of your blood pressure.      Medications Prescribed:  Current Discharge Medication List              Supplementary Documentation:

## 2025-01-25 NOTE — ED QUICK NOTES
Pt's son states pt felt a pop in her abdomen. Last time it did that the Gtube balloon had burst. States left Gtube in place but it is more slack than usual.

## 2025-01-26 ENCOUNTER — HOSPITAL ENCOUNTER (EMERGENCY)
Facility: HOSPITAL | Age: 85
Discharge: HOME | End: 2025-01-26
Attending: EMERGENCY MEDICINE
Payer: MEDICARE

## 2025-01-26 VITALS
BODY MASS INDEX: 27 KG/M2 | HEART RATE: 61 BPM | OXYGEN SATURATION: 99 % | DIASTOLIC BLOOD PRESSURE: 76 MMHG | TEMPERATURE: 98 F | RESPIRATION RATE: 18 BRPM | WEIGHT: 160 LBS | SYSTOLIC BLOOD PRESSURE: 107 MMHG

## 2025-01-26 DIAGNOSIS — T85.528A DISLODGED GASTROSTOMY TUBE: Primary | ICD-10-CM

## 2025-01-26 PROCEDURE — 99283 EMERGENCY DEPT VISIT LOW MDM: CPT

## 2025-01-26 PROCEDURE — 43762 RPLC GTUBE NO REVJ TRC: CPT

## 2025-01-26 NOTE — ED INITIAL ASSESSMENT (HPI)
Pt arrives via wheelchair to ED after G-tube fell out today. Per son, pt was just here on Friday and had G-tube replaced. Denies pain. Alert and oriented.

## 2025-01-26 NOTE — ED PROVIDER NOTES
Patient Seen in: [facility] Emergency Department      History     Chief Complaint   Patient presents with    Cath Tube Problem     Stated Complaint: needs g-tube replaced.    Subjective:   HPI      85-year-old female presents with needing her G-tube replaced.  Patient and son state balloon did not stay inflated from their prior G-tube replacement.  No abdominal pain.  Tube is been present since     Objective:     Past Medical History:    Anesthesia complication    Anxiety state, unspecified    with panic attacks    Disorder of thyroid    Esophageal reflux    Essential hypertension    Hearing impairment    bilateral hearing aids    Hematuria    High blood pressure    High cholesterol    Hypothyroidism    Lumbar spinal stenosis    Obesity    HOLA (obstructive sleep apnea)    AHI 38 RDI 61 REM AHI 54 Supine AHI 38 non-supine AHI 0 Sao2 Skip 65% autoPAP 6-16 HME    Other and unspecified hyperlipidemia    PONV (postoperative nausea and vomiting)    severe nausea and ill feeling for weeks after surgery    Problems with swallowing    Sleep apnea    has c-pap. not using presently    Tremor    Vitamin D insufficiency              Past Surgical History:   Procedure Laterality Date    Back surgery  10/05/2020    C5-7 cervical fusion with Dr. Alexis @ [facility]          Cataract Bilateral     Dec 2018 (R), 2019 (L)    Cholecystectomy  3/19/15     Laparoscopic by Dr. Bautista    Colonoscopy  6/3/14    colon polyp and EGD done too.    Colonoscopy  2019    Egd  2019    gastric polyp removed (benign)    Egd  2020    chronic inactive gastritis with intestinal metplasia, neg for H pylori    Knee replacement surgery  07    right    Other surgical history  11/30/15    laminectomy L3-L4, L4-5; right L1-L2 lateral microdiscectomy                Social History     Socioeconomic History    Marital status:     Number of children: 3   Occupational History    Occupation: Retired switchboard     Tobacco Use    Smoking status: Never    Smokeless tobacco: Never   Vaping Use    Vaping status: Never Used   Substance and Sexual Activity    Alcohol use: No    Drug use: No     Social Drivers of Health     Physical Activity: Unknown (12/30/2022)    [facility]    Exercise Vital Sign     On average, how many days per week do you engage in moderate to strenuous exercise (like a brisk walk)?: 2 days    [facility], [facility]    Social Connections    Received from [email or web address] [email or web address]    [facility]                  Physical Exam     ED Triage Vitals [01/26/25 1547]   /63   Pulse 64   Resp 18   Temp 98.1 °F (36.7 °C)   Temp src Oral   SpO2 100 %   O2 Device None (Room air)       Current Vitals:   Vital Signs  BP: 107/76  Pulse: 61  Resp: 18  Temp: 98.1 °F (36.7 °C)  Temp src: Oral  MAP (mmHg): 85    Oxygen Therapy  SpO2: 99 %  O2 Device: None (Room air)        Physical Exam  Vital signs reviewed. Nursing note reviewed.  Constitutional: Well-developed. Well-nourished. In no acute distress  HENT: Mucous membranes moist.   EYES: No scleral icterus or conjunctival injection.  NECK: Full ROM. Supple.  Tracheostomy in place  CARDIAC: Normal rate.   PULM/CHEST: Non labored breathing  ABD: Non distended nontender.  G-tube in place left upper quadrant.   RECTAL: deferred  Extremities: No deformities  NEURO: Awake, alert, following commands, moving extremities, answering questions.   SKIN: Warm and dry. No rash or lesions.  PSYCH: Normal judgment. Normal affect.        ED Course   Labs Reviewed - No data to display         Procedure: PEG tube replacement    Indication: dislodged G tube  Consent: patient   Risks and benefits of the procedure were discussed  The stoma was identified and without any signs of bleeding, infection, or injury. An 18 Kiswahili G-tube was placed in the left  upper quadrant stoma using slow, gentle pressure until it passed without obstruction. 10 mL of saline were infused into the cuff through a syringe and gastric contents were aspirated through the feeding port.  Patient tolerated the procedure well without any immediate complications.   Procedure was performed by me.          MDM      Assessment:Patient is a 85 year old female presenting to the ED due to G-tube replacement.    Comorbidities/chronic illnesses impacting care: G-tube    History obtained from: Patient, patient's son    External records and previous hospitalization records reviewed and documented below    Consideration of Social Determinants of Health and Impact on Medical Decision Making:  Housing/Transportation/Financial Strain/Access to healthcare/Food insecurity/family or Community support/Language and Literacy/Substance abuse/Mental health concerns/Disabilities     -none    Radiography/Imaging:  No orders to display           ED course  Patient arrives here appearing well.  Her G-tube placed a few days prior balloon was no longer functional.  Tract seems intact as it has been present for multiple years.  18 Citizen of [place] G-tube was easily replaced, aspirated stomach contents.  For this reason we will defer x-ray imaging to confirm.  Patient will discharge home.              Medications - No data to display              Medical Decision Making      Disposition and Plan     Clinical Impression:  1. Dislodged gastrostomy tube         Disposition:  Discharge  1/26/2025  4:51 pm    Follow-up:  [facility] Emergency Department  [address]  [facility] [address]  [phone]  Follow up  If symptoms worsen    We recommend that you schedule follow up care with a primary care provider within the next three months to obtain basic health screening including reassessment of your blood pressure.      Medications Prescribed:  Discharge Medication List as of 1/26/2025  4:53 PM              Supplementary  Documentation:

## 2025-03-25 NOTE — OCCUPATIONAL THERAPY NOTE
OCCUPATIONAL THERAPY TREATMENT NOTE - INPATIENT        Room Number: 031/824-W           Presenting Problem: biphasic stridor    Problem List  Principal Problem:    Biphasic stridor  Active Problems:    GERD (gastroesophageal reflux disease)    Hypothyroidi PAIN ASSESSMENT  Ratin  Location: abdomen           ACTIVITIES OF DAILY LIVING ASSESSMENT  AM-PAC ‘6-Clicks’ Inpatient Daily Activity Short Form  How much help from another person does the patient currently need…  -   Putting on and taking of Auto

## 2025-05-01 NOTE — CONSULTS
Health Maintenance:     Health Maintenance       COVID-19 Vaccine (3 - 2024-25 season)  Overdue since 9/1/2024           Following review of the above:  Patient is not proceeding with: COVID-19    Note: Refer to final orders and clinician documentation.    Advanced Directive:  Advance Directives:  not discussed      [facility] ID CONSULT NOTE    Narcisa Ingram Patient Status:  Inpatient    1940 MRN [id number]   Location [facility] 2W/SW Attending Born, [address] Day # 15 PCP Johanna Polanco,        Reason for Consultatio able to mouth words and denies any pain at this time. Son is present at bedside.     History:  Past Medical History:   Diagnosis Date   • Anesthesia complication    • Anxiety state, unspecified     with panic attacks   • Disorder of thyroid    • Esophageal Adhesives          RASH    Medications:    Current Facility-Administered Medications:   •  levothyroxine (Synthroid) tab 25 mcg, 25 mcg, Per G Tube, Before breakfast  •  amoxicillin (AMOXIL) 250 MG/5ML suspension 500 mg, 500 mg, Per G Tube, 2 times per day mg, 0.25 mg, Per NG Tube, Nightly PRN  •  atropine 1 % ophthalmic solution 2 drop, 2 drop, Oral, Q2H PRN  •  dextrose 10 % infusion, , Intravenous, Continuous PRN  •  vancomycin (FIRVANQ) 50 MG/ML oral solution 125 mg, 125 mg, Per G Tube, Daily  •  scopola stenosis, HTN, HOLA, s/p C5-C7 spinal fusion via anterior approach done in 10/2020 at [facility], had persistent pain and admitted to [address] [place] [address] 3/2021 for revision but in OR had deep purulent fluid collection s/p I&D, cx with strep anginosus and Eikenella corrodens, bar 11/22 and peg placement 11/23  - has already completed 10 days course of IV vanc and zosyn  - currently on OVP    # acute appendicitis status post exploratory lap with appendectomy 11/24    # Esophageal perforation with loosening of hardware  # h/o PE  # H

## 2025-05-23 ENCOUNTER — APPOINTMENT (OUTPATIENT)
Dept: GENERAL RADIOLOGY | Facility: HOSPITAL | Age: 85
End: 2025-05-23
Attending: EMERGENCY MEDICINE
Payer: MEDICARE

## 2025-05-23 ENCOUNTER — HOSPITAL ENCOUNTER (EMERGENCY)
Facility: HOSPITAL | Age: 85
Discharge: HOME | End: 2025-05-23
Attending: EMERGENCY MEDICINE
Payer: MEDICARE

## 2025-05-23 VITALS
BODY MASS INDEX: 28 KG/M2 | WEIGHT: 162 LBS | SYSTOLIC BLOOD PRESSURE: 169 MMHG | DIASTOLIC BLOOD PRESSURE: 84 MMHG | RESPIRATION RATE: 20 BRPM | OXYGEN SATURATION: 98 % | TEMPERATURE: 98 F | HEART RATE: 82 BPM

## 2025-05-23 DIAGNOSIS — T85.528A DISLODGED GASTROSTOMY TUBE: Primary | ICD-10-CM

## 2025-05-23 PROCEDURE — 74021 RADEX ABDOMEN 3+ VIEWS: CPT | Performed by: EMERGENCY MEDICINE

## 2025-05-23 PROCEDURE — 43762 RPLC GTUBE NO REVJ TRC: CPT

## 2025-05-23 PROCEDURE — 99284 EMERGENCY DEPT VISIT MOD MDM: CPT

## 2025-05-23 PROCEDURE — 99283 EMERGENCY DEPT VISIT LOW MDM: CPT

## 2025-05-23 NOTE — ED PROVIDER NOTES
Patient Seen in: [facility] Emergency Department        History  Chief Complaint   Patient presents with    Cath Tube Problem     Stated Complaint: Gtube Displacement    Subjective:   85-year-old female here with son to have G-tube replaced.  He says they do pull out from time to time but he pushed it back in to keep the stoma open.  He thinks it happened this evening.  He also states that he bring 18 Yoruba G-tubes with him because Medicare supplies him.  He states otherwise she has no issues.  No fevers or vomiting.  She is having bowel movements                      Objective:     No pertinent past medical history.            No pertinent past surgical history.              Social History     Socioeconomic History    Marital status:     Number of children: 3   Occupational History    Occupation: Retired    Tobacco Use    Smoking status: Never    Smokeless tobacco: Never   Vaping Use    Vaping status: Never Used   Substance and Sexual Activity    Alcohol use: No    Drug use: No     Social Drivers of Health      Received from [facility]                                Physical Exam    ED Triage Vitals [05/23/25 0059]   BP (!) 162/97   Pulse 94   Resp 20   Temp 98.4 °F (36.9 °C)   Temp src Oral   SpO2 92 %   O2 Device None (Room air)       Current Vitals:   Vital Signs  BP: (!) 169/84  Pulse: 76  Resp: 20  Temp: 98.4 °F (36.9 °C)  Temp src: Oral  MAP (mmHg): (!) 111    Oxygen Therapy  SpO2: 98 %  O2 Device: None (Room air)            Physical Exam  HENT:      Right Ear: External ear normal.      Left Ear: External ear normal.      Mouth/Throat:      Mouth: Mucous membranes are moist.   Eyes:      Extraocular Movements: Extraocular movements intact.      Pupils: Pupils are equal, round, and reactive to light.   Cardiovascular:      Rate and Rhythm: Normal rate.      Pulses: Normal pulses.   Pulmonary:      Effort: Pulmonary effort is normal.   Abdominal:       Palpations: Abdomen is soft.      Comments: G-tube removed.  Minimal redness around the stoma site.  No tenderness in her abdomen.  Balloon did appear to be ruptured.   Musculoskeletal:         General: Normal range of motion.      Cervical back: Normal range of motion.   Skin:     General: Skin is warm.   Neurological:      Mental Status: She is alert.      Sensory: No sensory deficit.      Motor: No weakness.                 ED Course  Labs Reviewed - No data to display       XR ABDOMEN (G TUBE INJECTION)    COMPARISON: 1/24/2025, priors    IMPRESSION:    The control image demonstrates a gastrostomy catheter projecting over the left mid abdomen.  Right upper quadrant cholecystectomy clips.  Patchy densities projecting in the left lower lobe of the lung corresponds to presumed aspirated barium on previous CT.    No dilated bowel loops to indicate obstruction.    Postinjection, there is filling of the gastric fundus, duodenum, and proximal jejunum on the first supine AP image.  The anterior oblique images show contrast outlining the catheter balloon within the gastric body.    Case results were faxed/electronically transmitted at 9526 EST.  If there are any questions please feel free to contact me directly at [phone], ext [phone]. If you cannot reach me at this number, do not leave a voicemail. Please call [phone] ext [phone] and ask for the next available radiologist.      Erik Weiner M.D.  This report has been electronically signed and verified by the Radiologist whose name is printed above.       This report contains privileged and confidential information and is intended solely for the use of the individual or entity to which it is addressed. If you are not the intended recipient of this report, you are hereby notified that any copying, distribution, dissemination or action taken in relation to the contents of this report is strictly prohibited and may be unlawful. If you have received this report in  error, please notify the sender immediately at [phone] and permanently delete the original report and destroy any copies or printouts.                      MDM     Procedure: I replaced an 18 Congolese G-tube in filled the balloon with 8 mL of saline.  Dressing was placed.  X-ray ordered.        Medical Decision Making  Patient here with malfunctioning G-tube.  Could be ruptured balloon or traumatic removal.  Patient is in no distress and has no new complaints otherwise.  Patient's son supplied a new G-tube and I easily replaced it.  KUB x-ray ordered with Gastrografin    Amount and/or Complexity of Data Reviewed  Radiology: ordered and independent interpretation performed.     Details: G-tube with good placement  Discussion of management or test interpretation with external provider(s): Patient did well in the ER.  Follow-up given.  Will return for changes worsening    Risk  Risk Details: G-tube feedings        Disposition and Plan     Clinical Impression:  1. Dislodged gastrostomy tube         Disposition:  Discharge  5/23/2025  4:01 am    Follow-up:  Juan Uribe DO  [address] [id number] [phone]    Follow up      We recommend that you schedule follow up care with a primary care provider within the next three months to obtain basic health screening including reassessment of your blood pressure.      Medications Prescribed:  Current Discharge Medication List                Supplementary Documentation:

## 2025-05-23 NOTE — ED INITIAL ASSESSMENT (HPI)
Pt to ed w/c of G tube placement problems. Per son w/pt, she receives a feeding every night, pt's son reports she went to the bathroom and when she came back he noted that the tube was almost out. Pt's son placed dressing to prevent complete dislodgement. Pt denies pain

## 2025-07-02 ENCOUNTER — TELEPHONE (OUTPATIENT)
Dept: FAMILY MEDICINE CLINIC | Facility: CLINIC | Age: 85
End: 2025-07-02

## 2025-07-02 DIAGNOSIS — R73.01 IFG (IMPAIRED FASTING GLUCOSE): ICD-10-CM

## 2025-07-02 DIAGNOSIS — Z01.812 ENCOUNTER FOR PRE-OPERATIVE LABORATORY TESTING: ICD-10-CM

## 2025-07-02 DIAGNOSIS — R06.02 SOB (SHORTNESS OF BREATH): ICD-10-CM

## 2025-07-02 DIAGNOSIS — Z01.818 PRE-OP TESTING: Primary | ICD-10-CM

## 2025-07-07 ENCOUNTER — LAB ENCOUNTER (OUTPATIENT)
Dept: LAB | Facility: HOSPITAL | Age: 85
End: 2025-07-07
Attending: FAMILY MEDICINE
Payer: MEDICARE

## 2025-07-07 DIAGNOSIS — Z01.818 PRE-OP TESTING: ICD-10-CM

## 2025-07-07 DIAGNOSIS — Z01.812 ENCOUNTER FOR PRE-OPERATIVE LABORATORY TESTING: ICD-10-CM

## 2025-07-07 DIAGNOSIS — R73.01 IFG (IMPAIRED FASTING GLUCOSE): ICD-10-CM

## 2025-07-07 DIAGNOSIS — R06.02 SOB (SHORTNESS OF BREATH): ICD-10-CM

## 2025-07-07 LAB
ALBUMIN SERPL-MCNC: 4.8 G/DL (ref 3.2–4.8)
ALBUMIN/GLOB SERPL: 2.3 {RATIO} (ref 1–2)
ALP LIVER SERPL-CCNC: 70 U/L (ref 55–142)
ALT SERPL-CCNC: 26 U/L (ref 10–49)
ANION GAP SERPL CALC-SCNC: 6 MMOL/L (ref 0–18)
APTT PPP: 26.1 SECONDS (ref 23–36)
AST SERPL-CCNC: 20 U/L (ref ?–34)
ATRIAL RATE: 66 BPM
BASOPHILS # BLD AUTO: 0.05 X10(3) UL (ref 0–0.2)
BASOPHILS NFR BLD AUTO: 0.8 %
BILIRUB SERPL-MCNC: 0.7 MG/DL (ref 0.2–1.1)
BILIRUB UR QL: NEGATIVE
BUN BLD-MCNC: 21 MG/DL (ref 9–23)
BUN/CREAT SERPL: 22.8 (ref 10–20)
CALCIUM BLD-MCNC: 9.8 MG/DL (ref 8.7–10.4)
CHLORIDE SERPL-SCNC: 106 MMOL/L (ref 98–112)
CO2 SERPL-SCNC: 28 MMOL/L (ref 21–32)
COLOR UR: YELLOW
CREAT BLD-MCNC: 0.92 MG/DL (ref 0.55–1.02)
CRP SERPL-MCNC: <0.5 MG/DL (ref ?–0.5)
DEPRECATED RDW RBC AUTO: 42.5 FL (ref 35.1–46.3)
EGFRCR SERPLBLD CKD-EPI 2021: 60 ML/MIN/1.73M2 (ref 60–?)
EOSINOPHIL # BLD AUTO: 0.05 X10(3) UL (ref 0–0.7)
EOSINOPHIL NFR BLD AUTO: 0.8 %
ERYTHROCYTE [DISTWIDTH] IN BLOOD BY AUTOMATED COUNT: 11.9 % (ref 11–15)
ERYTHROCYTE [SEDIMENTATION RATE] IN BLOOD: 10 MM/HR (ref 0–30)
FASTING STATUS PATIENT QL REPORTED: NO
GLOBULIN PLAS-MCNC: 2.1 G/DL (ref 2–3.5)
GLUCOSE BLD-MCNC: 100 MG/DL (ref 70–99)
GLUCOSE UR-MCNC: NORMAL MG/DL
HCT VFR BLD AUTO: 40.6 % (ref 35–48)
HGB BLD-MCNC: 13.7 G/DL (ref 12–16)
HGB UR QL STRIP.AUTO: NEGATIVE
IMM GRANULOCYTES # BLD AUTO: 0.02 X10(3) UL (ref 0–1)
IMM GRANULOCYTES NFR BLD: 0.3 %
INR BLD: 1 (ref 0.8–1.2)
KETONES UR-MCNC: NEGATIVE MG/DL
LEUKOCYTE ESTERASE UR QL STRIP.AUTO: NEGATIVE
LYMPHOCYTES # BLD AUTO: 1.65 X10(3) UL (ref 1–4)
LYMPHOCYTES NFR BLD AUTO: 25.2 %
MCH RBC QN AUTO: 32.6 PG (ref 26–34)
MCHC RBC AUTO-ENTMCNC: 33.7 G/DL (ref 31–37)
MCV RBC AUTO: 96.7 FL (ref 80–100)
MONOCYTES # BLD AUTO: 0.54 X10(3) UL (ref 0.1–1)
MONOCYTES NFR BLD AUTO: 8.2 %
NEUTROPHILS # BLD AUTO: 4.24 X10 (3) UL (ref 1.5–7.7)
NEUTROPHILS # BLD AUTO: 4.24 X10(3) UL (ref 1.5–7.7)
NEUTROPHILS NFR BLD AUTO: 64.7 %
NITRITE UR QL STRIP.AUTO: NEGATIVE
OSMOLALITY SERPL CALC.SUM OF ELEC: 293 MOSM/KG (ref 275–295)
P AXIS: 46 DEGREES
P-R INTERVAL: 148 MS
PH UR: 7 [PH] (ref 5–8)
PLATELET # BLD AUTO: 215 10(3)UL (ref 150–450)
POTASSIUM SERPL-SCNC: 5 MMOL/L (ref 3.5–5.1)
PROT SERPL-MCNC: 6.9 G/DL (ref 5.7–8.2)
PROT UR-MCNC: NEGATIVE MG/DL
PROTHROMBIN TIME: 13.8 SECONDS (ref 11.6–14.8)
Q-T INTERVAL: 444 MS
QRS DURATION: 98 MS
QTC CALCULATION (BEZET): 465 MS
R AXIS: -36 DEGREES
RBC # BLD AUTO: 4.2 X10(6)UL (ref 3.8–5.3)
SODIUM SERPL-SCNC: 140 MMOL/L (ref 136–145)
SP GR UR STRIP: 1.02 (ref 1–1.03)
T AXIS: 5 DEGREES
UROBILINOGEN UR STRIP-ACNC: NORMAL
VENTRICULAR RATE: 66 BPM
WBC # BLD AUTO: 6.6 X10(3) UL (ref 4–11)

## 2025-07-07 PROCEDURE — 85025 COMPLETE CBC W/AUTO DIFF WBC: CPT

## 2025-07-07 PROCEDURE — 85610 PROTHROMBIN TIME: CPT

## 2025-07-07 PROCEDURE — 86140 C-REACTIVE PROTEIN: CPT

## 2025-07-07 PROCEDURE — 83036 HEMOGLOBIN GLYCOSYLATED A1C: CPT

## 2025-07-07 PROCEDURE — 93010 ELECTROCARDIOGRAM REPORT: CPT | Performed by: INTERNAL MEDICINE

## 2025-07-07 PROCEDURE — 81001 URINALYSIS AUTO W/SCOPE: CPT

## 2025-07-07 PROCEDURE — 80053 COMPREHEN METABOLIC PANEL: CPT

## 2025-07-07 PROCEDURE — 36415 COLL VENOUS BLD VENIPUNCTURE: CPT

## 2025-07-07 PROCEDURE — 93005 ELECTROCARDIOGRAM TRACING: CPT

## 2025-07-07 PROCEDURE — 85730 THROMBOPLASTIN TIME PARTIAL: CPT

## 2025-07-07 PROCEDURE — 85652 RBC SED RATE AUTOMATED: CPT

## 2025-07-07 PROCEDURE — 87081 CULTURE SCREEN ONLY: CPT

## 2025-07-08 LAB
EST. AVERAGE GLUCOSE BLD GHB EST-MCNC: 94 MG/DL (ref 68–126)
HBA1C MFR BLD: 4.9 % (ref ?–5.7)

## (undated) DEVICE — ELECTRODE ESURG 10FT 2 DSPR ADH BRDR PULL TAB PREATTACH CBL

## (undated) DEVICE — ARISTA 1 GRAM

## (undated) DEVICE — CO2 ACUPULSE DUO SURGICAL LASER

## (undated) DEVICE — NVM5 MULTIMODALITY SURFACE KIT

## (undated) DEVICE — FLOSEAL HEMOSTATIC MATRIX, 5ML: Brand: FLOSEAL HEMOSTATIC MATRIX

## (undated) DEVICE — SUTURE SILK 3-0 SH

## (undated) DEVICE — 9534HP TRANSPARENT DRSG W/FRAME: Brand: 3M™ TEGADERM™

## (undated) DEVICE — DEPRESSOR TNG 6IN BLADE SMTH SPLNTR FREE STRL WD LF

## (undated) DEVICE — 3.2MM X 18.3MM METAL CUTTING HELIOCOIDAL RASP

## (undated) DEVICE — PEN: MARKING STD PT 100/CS: Brand: MEDICAL ACTION INDUSTRIES

## (undated) DEVICE — CERVICAL CDS: Brand: MEDLINE INDUSTRIES, INC.

## (undated) DEVICE — Device

## (undated) DEVICE — CULTURE TUBE ANAEROBIC

## (undated) DEVICE — SUTURE VICRYL 0 J906G

## (undated) DEVICE — DRAPE .75 SHT FNFLD 76X52IN SURG CNVRT STRL LF DISP TIBURON

## (undated) DEVICE — SUTURE SILK 0

## (undated) DEVICE — C-ARMOR C-ARM EQUIPMENT COVERS CLEAR STERILE UNIVERSAL FIT 12 PER CASE: Brand: C-ARMOR

## (undated) DEVICE — SUTURE VICRYL 3-0 SH

## (undated) DEVICE — SOL  .9 1000ML BTL

## (undated) DEVICE — SUTURE MONOCRYL 3-0 Y497G

## (undated) DEVICE — FLEXIBLE YANKAUER,MEDIUM TIP, NO VACUUM CONTROL: Brand: ARGYLE

## (undated) DEVICE — HEAD & NECK: Brand: MEDLINE INDUSTRIES, INC.

## (undated) DEVICE — SPONGE SURG 3X.5IN CTTND ABS PRCS CUT RADOPQ PATTIE STRL LF

## (undated) DEVICE — X-RAY DETECTABLE SPONGES,16 PLY: Brand: VISTEC

## (undated) DEVICE — GLOVE SURG 6.5 PREMIERPRO LF NATURAL PF TXTR SMTH STRL

## (undated) DEVICE — SYRINGE 5ML GRAD N-PYRG DEHP-FR PVC FREE STRL MED LF DISP LL

## (undated) DEVICE — DRAPE SHEET LG

## (undated) DEVICE — SUTURE SILK 2-0 SA65H

## (undated) DEVICE — PAD DRSG 8X3IN CRD POLY CTN ABS PERFORATE FILM LF STRL

## (undated) DEVICE — PENROSE DRAIN 12" X 3/8": Brand: CARDINAL HEALTH

## (undated) DEVICE — PEN SURGMRK DEVON SKIN DISP REG TIP RULER CAP GENTIAN VIOL

## (undated) DEVICE — SYRINGE MNJCT 10ML LF STRL REG

## (undated) DEVICE — SPONGE: SPECIALTY PEANUT XR 100/CS: Brand: MEDICAL ACTION INDUSTRIES

## (undated) DEVICE — GOWN SURG XL L3 NONREINFORCE SET IN SLV STRL LF DISP BLUE

## (undated) DEVICE — CULTURE COLLECT/TRANSPORT SYS

## (undated) DEVICE — COVER SGL STRL LGHT HNDL BLU

## (undated) DEVICE — TOWEL OR BLU 16X26IN 4PK

## (undated) DEVICE — ACCUVAC SMOKE ATTACHMENT

## (undated) DEVICE — WATER STRL 500ML PLASTIC POUR BTL LF

## (undated) DEVICE — MEDI-VAC NON-CONDUCTIVE SUCTION TUBING: Brand: CARDINAL HEALTH

## (undated) DEVICE — KIT DRN 1/8IN PVC 3 SPRG EVAC

## (undated) DEVICE — DERMABOND LIQUID ADHESIVE

## (undated) DEVICE — SPONGE SURG 4X4IN CTN 16 PLY XRY DTCT STRL LF DISP

## (undated) DEVICE — TUBE G 18FR

## (undated) DEVICE — NON-ADHERENT PAD PREPACK: Brand: TELFA

## (undated) DEVICE — 3.0MM PRECISION NEURO (MATCH HEAD)

## (undated) DEVICE — GLOVE SURG 7 PROTEXIS LF BLUE PF SMTH BEAD CUFF INTLK STRL

## (undated) DEVICE — GAUZE,PACKING STRIP,PLAIN,1/2"X5YD,STRL: Brand: CURAD

## (undated) DEVICE — SUTURE PDS II 0 CTX

## (undated) DEVICE — TUBING SCT CLR 12FT 316IN MDVC MAXI-GRIP NCDTV MALE TO MALE

## (undated) DEVICE — CHLORAPREP ORANGE TINT 10.5ML

## (undated) DEVICE — A P RESECTION: Brand: MEDLINE INDUSTRIES, INC.

## (undated) DEVICE — VIOLET BRAIDED (POLYGLACTIN 910), SYNTHETIC ABSORBABLE SUTURE: Brand: COATED VICRYL

## (undated) DEVICE — LUBRICANT JLY SURGILUBE 2OZ

## (undated) DEVICE — DRAPE SHEET LAPAROTOMY

## (undated) DEVICE — NEEDLE HPO 16GA 1.5IN REG WALL REG BVL LL HUB DEHP-FR STRL

## (undated) DEVICE — GLOVE SURG 6.5 PROTEXIS LF CRM PF BEAD CUFF STRL PLISPRN

## (undated) DEVICE — FORCEP CUSH BAY BP DISP 7.5IN

## (undated) DEVICE — CONTAINER SPEC 4OZ 2.5X2.75IN OR POS INDCTR TMPR EVD LEAK

## (undated) DEVICE — 3M™ STERI-STRIP™ REINFORCED ADHESIVE SKIN CLOSURES, R1547, 1/2 IN X 4 IN (12 MM X 100 MM), 6 STRIPS/ENVELOPE: Brand: 3M™ STERI-STRIP™

## (undated) DEVICE — GOWN SURG LG L3 NONREINFORCE SET IN SLV STRL LF DISP BLUE

## (undated) DEVICE — GAMMEX® PI HYBRID SIZE 6.5, STERILE POWDER-FREE SURGICAL GLOVE, POLYISOPRENE AND NEOPRENE BLEND: Brand: GAMMEX

## (undated) DEVICE — Device: Brand: PORTEX

## (undated) DEVICE — SPONGE GAUZE 4X4IN RAYON POLY 6 PLY DRN TUBE HI ABS LOW LINT

## (undated) DEVICE — GLOVE SURG 7 PROTEXIS LF CRM PF BEAD CUFF STRL PLISPRN 12IN

## (undated) DEVICE — SOLUTION IRR BTL 250CC NACL

## (undated) DEVICE — MINOR GENERAL: Brand: MEDLINE INDUSTRIES, INC.

## (undated) DEVICE — SPONGE SURG 1X.5IN CTTND ABS RADOPQ PRCS CUT PATTIE STRL LF

## (undated) DEVICE — SUTURE SILK 2-0 SH

## (undated) DEVICE — GLOVE SURG 7 PREMIERPRO LF NATURAL PF TXTR SMTH STRL

## (undated) DEVICE — UNDYED BRAIDED (POLYGLACTIN 910), SYNTHETIC ABSORBABLE SUTURE: Brand: COATED VICRYL

## (undated) DEVICE — DILATOR ENDO 6FR 12-13.5-15MM 180CM CRE FX WIRE BLN RND

## (undated) DEVICE — GLOVE SURG 7.5 PROTEXIS LF CRM PF SMTH BEAD CUFF STRL

## (undated) DEVICE — BLADE 11 SHRP BP SS SRG STRL

## (undated) DEVICE — SUCTION CANISTER, 3000CC,SAFELINER: Brand: DEROYAL

## (undated) DEVICE — SYRINGE 3ML GRAD N-PYRG DEHP-FR PVC FREE STRL MED LF DISP LL

## (undated) DEVICE — SET BLD COL 25GA 7IN VCTNR BD SFLOK TUBE WING PSHBTN BTRFLY

## (undated) DEVICE — REM POLYHESIVE ADULT PATIENT RETURN ELECTRODE: Brand: VALLEYLAB

## (undated) DEVICE — CHLORAPREP 26ML APPLICATOR

## (undated) DEVICE — CATHETER BARD 14FR FOLEY RND TIP INTMT AP RBR URTH STRL LTX

## (undated) DEVICE — GAUZE SPONGES,12 PLY: Brand: CURITY

## (undated) DEVICE — PAD EYE 2 58INX1 58IN OVAL CURITY CTN ABS NONWOVEN LF STRL

## (undated) DEVICE — NON-ADHERENT DRESSING: Brand: TELFA

## (undated) DEVICE — DEVICE CRE STERI-FLATE INFL DISP

## (undated) DEVICE — ENCORE® LATEX ACCLAIM SIZE 8, STERILE LATEX POWDER-FREE SURGICAL GLOVE: Brand: ENCORE

## (undated) DEVICE — ADHESIVE MASTISOL 2/3CC VL

## (undated) DEVICE — GAMMEX® PI HYBRID SIZE 8.5, STERILE POWDER-FREE SURGICAL GLOVE, POLYISOPRENE AND NEOPRENE BLEND: Brand: GAMMEX

## (undated) DEVICE — EXOFIN TISSUE ADHESIVE 1.0ML

## (undated) DEVICE — GAUZE,PACKING STRIP,IODOFORM,1/2"X5YD,ST: Brand: CURAD

## (undated) NOTE — LETTER
[facility], [place]  Authorization for Invasive Procedures  1.  I hereby authorize Dr. Cole Tapia , my physician and whomever may be designated as the doctor's assistant, to perform the following operation and/or procedure:  Explorator and allergic reactions, hemolytic reactions, transmission of disease such as hepatitis, AIDS, cytomegalovirus (CMV), and flluid overload.  In the event that I wish to have autologous transfusions of my own blood, or a directed donor transfusion, I will disc Signature of Patient:  ________________________________________________ Date: _________Time: _________    Responsible person in case of minor or unconscious: _____________________________Relationship: ____________     Witness Signature: _______________

## (undated) NOTE — IP AVS SNAPSHOT
[facility]            (For Outpatient Use Only) Initial Admit Date: 11/16/2021   Inpt/Obs Admit Date: Inpt: 11/16/21 / Obs: N/A   Discharge Date:    Pennie Farmer:  [de-identified]   MRN: [de-identified]   CSN: [id number]   CEID: [id number]        E TERTIARY INSURANCE   Payor:  Plan:    Group Number:  Insurance Type:    Subscriber Name:  Subscriber :    Subscriber ID:  Pt Rel to Subscriber:    Hospital Account Financial Class: Medicare    2021

## (undated) NOTE — LETTER
Patient Name: Debra Crain  YOB: 1940          MRN number:  [id number]  Date:  5/27/2021  Referring Physician: Lori Brown    ADULT VIDEOFLUOROSCOPIC SWALLOWING STUDY:    Referring Physician: Darshana Chin      Radiologist: Dr. Dionte Davalos time for infection and perforation to heal.  Dysphagia exercises were not completed at home as it was felt they may disrupt the healing process.   The patient was seen yesterday, 5/26/21, for a clinical swallow evaluation during which aspiration could not b aspiration.  Tracheobronchial tree on today's exam is grossly   clear centrally and no focal consolidation or pleural effusion is noted to suggest aspiration pneumonia.       3.  Unchanged bilateral adrenal gland nodules.  Right nodule remains indeterminate second swallow. Esophageal phase:   Adequate flow of bolus through upper esophagus     Penetration Aspiration Scale: 8/8. Material entered the airway, passed below the vocal cords and no attempt was made to eject.       Overall Impression: Jef Schmidt aspiration precautions and swallow strategies independently   Sit upright  Small sips  Small bites  Eat slowly  Swallow twice with each bite  Chin tuck with nectar thick liquids.   • Patient will reduce risk of aspiration by completing the following dysphag

## (undated) NOTE — LETTER
[address] [facility]     I agree to have a Peripherally Inserted Central Catheter (PICC) placed in my arm.    1. The PICC insertion procedure, care, maintenance, risks, benefits, and complications have been explained to me b risks, benefits, and side effects related to the alternatives and risks related to not receiving this procedure. 8.  I have expressed any questions about this procedure to my physician or the PICC Proceduralist and he/she has answered them.   I certify t

## (undated) NOTE — LETTER
[address], [place]  Authorization for Invasive Procedures  1.  I hereby authorize Dr. Chyna Whitehead , my physician and whomever may be designated as the doctor's assistant, to perform the following operation and/or procedure:  *** on [place] reactions, hemolytic reactions, transmission of disease such as hepatitis, AIDS, cytomegalovirus (CMV), and flluid overload.  In the event that I wish to have autologous transfusions of my own blood, or a directed donor transfusion, I will discuss this with ________________________________________________ Date: _________Time: _________    Responsible person in case of minor or unconscious: _____________________________Relationship: ____________     Witness Signature: _________________________________________

## (undated) NOTE — LETTER
[address], [place]  Authorization for Invasive Procedures  1.  I hereby authorize Dr. Ronny Linda , my physician and whomever may be designated as the doctor's assistant, to perform the following operation and/or procedure:  Tracheost and allergic reactions, hemolytic reactions, transmission of disease such as hepatitis, AIDS, cytomegalovirus (CMV), and flluid overload.  In the event that I wish to have autologous transfusions of my own blood, or a directed donor transfusion, I will disc Signature of Patient:  ________________________________________________ Date: _________Time: _________    Responsible person in case of minor or unconscious: _____________________________Relationship: ____________     Witness Signature: _______________

## (undated) NOTE — LETTER
[facility]  Administration of Anesthesia  1. Andkaren Ellsworth, or _________________________________ acting on her behalf, (Patient) (Dependent/Representative) request to receive anesthesia for my pending procedure/operation/treatment.   A bleeding, seizure, cardiac arrest and death. 7. AWARENESS: I understand that it is possible (but unlikely) to have explicit memory of events from the operating room while under general anesthesia.   8. ELECTROCONVULSIVE THERAPY PATIENTS: This consent serve below affirms that prior to the time of the procedure, I have explained to the patient and/or his/her guardian, the risks and benefits of undergoing anesthesia, as well as any reasonable alternatives.     ___________________________________________________

## (undated) NOTE — LETTER
[address]      Authorization for Surgical Operation and Procedure     Date:__4/24/2021_________                                                                                                         [facility] potential risks that can occur: fever and allergic reactions, hemolytic reactions, transmission of diseases such as Hepatitis, AIDS and Cytomegalovirus (CMV) and fluid overload.   In the event that I wish to have an autologous transfusion of my own blood, o will determine when the applicable recovery period ends for purposes of reinstating the DNAR order.   10. Patients having a sterilization procedure: I understand that if the procedure is successful the results will be permanent and it will therefore be impo _______________________________________________________________ _____________________________  Liset Sol  Physician)                                                                                         (Date)                                   (Time)

## (undated) NOTE — LETTER
[address]  [facility], [facility], [place]  [address]  INFORMED CONSENT FOR TRANSFUSION OF BLOOD OR BLOOD PRODUCTS  My physician has informed me of the nature, purpose, benefits and risks of transfusion for blood and blood components that ______________________________________________  (Signature of Patient)                                                            (Responsible party in case of Minor,

## (undated) NOTE — IP AVS SNAPSHOT
[facility]            (For Outpatient Use Only) Initial Admit Date: 6/20/2021   Inpt/Obs Admit Date: Inpt: 6/21/21 / Obs: N/A   Discharge Date:    Rob Rose:  [de-identified]   MRN: [de-identified]   [facility]: [id number]   [address]: [id number]        VR Number:  Insurance Type:    Subscriber Name:  Subscriber :    Subscriber ID:  Pt Rel to Subscriber:    Hospital Account Financial Class: Medicare    2021

## (undated) NOTE — IP AVS SNAPSHOT
Patient Demographics     Address  [address] [facility] [address] Phone  [phone] [facility])  [phone] (Mobile) *Preferred* E-mail Address  [email or web address]      Emergency Contact(s)     Name Relation Home Work [address] [place] [address] AUGMENTIN      1 tablet (500 mg total) by Per G Tube route every 12 (twelve) hours. [facility] [place]         clotrimazole 1 % Crea  Commonly known as: LOTRIMIN      Apply 1 g topically 2 (two) times daily as needed (red rash areas).    Asha Collins Tabs  ipratropium-albuterol 0.5-2.5 (3) MG/3ML Soln  levothyroxine 25 MCG Tabs     Please  your prescriptions at the location directed by your doctor or nurse    Bring a paper prescription for each of these medications  Amoxicillin-Pot Clavulanate 2 1000   Temp 97.8 °F (36.6 °C) Filed at 12/02/2021 0400   SpO2 98 % Filed at 12/02/2021 1000      Patient's Most Recent Weight       Most Recent Value   Patient Weight 66 kg (145 lb 8.1 oz)      Lab Results Last 24 Hours    No matching results found     Devon hardware insertion for C5-C7 fusion in October 2020 at [facility].  The course was unfortunately complicated by dysphagia and subsequent recognition of infection of the hardware plate.   The patient had subsequent further dysphagia and stridor Mau   •      • CATARACT Bilateral     Dec 2018 (R), 2019 (L)   • CHOLECYSTECTOMY  3/19/15     Laparoscopic by Dr. Santosh Briones   • COLONOSCOPY  6/3/14    colon polyp and EGD done too.    • COLONOSCOPY  2019   • EGD  2019    gastric poly and percussion. Cardiac regular rate and rhythm no murmur. Abdomen nontender, without hepatosplenomegaly and no mass appreciable. Extremities without clubbing cyanosis with 1-1/2+ lower extremity edema.    Neurologic notable for encephalopathy and she time.        Radhames Del Toro MD  Medical Director, Critical Care, [address] [place] [address]  Medical Director, [address] [place] [address] 299 E  Pager: [phone]      Electronically signed by Alan Ragsdale MD on 11/16/2021  8:57 PM              Consults - MD Consult Note 11/16/21 and was continued on augmentin for life long suppression. This hospital admission, she was admitted on 11/16/2021 for severe stridor with impaired mentation requiring emergent intubation.   She is now status post tracheostomy 11/22 and PEG tube right   • OTHER SURGICAL HISTORY  11/30/15    laminectomy L3-L4, L4-5; right L1-L2 lateral microdiscectomy     Family History   Problem Relation Age of Onset   • Cancer Father         eustachian tube   • Cancer Mother         unknown primary   • Heart Diso (FLEET) 7-19 GM/118ML enema 133 mL, 1 enema, Rectal, Once PRN  •  acetaminophen (Tylenol) rectal suppository 650 mg, 650 mg, Rectal, Q6H PRN  •  LORazepam (ATIVAN) injection 0.5 mg, 0.5 mg, Intravenous, Q2H PRN  •  morphINE sulfate (PF) 2 MG/ML injection 1 144* 141* 108*   BUN 25*   < > 29* 34* 36*   CREATSERUM 1.71*   < > 1.85* 1.71* 1.61*   GFRAA 31*   < > 29* 31* 34*   GFRNAA 27*   < > 25* 27* 29*   CA 8.2*   < > 8.8 8.5 9.1   ALB 1.7*  --   --   --   --       < > 141 140 141   K 4.5   < > 4.5 4.8 4 days course of IV vancomycin and Zosyn and is now off IV antibiotics.      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 Unable to trial at this time d/t pt fatigue. Discussed with son recommendation for pt to use w/c and bump up/down with 2 person assist, son and pt agreeable. Son's reports he has done so in the past with another family member.  Discuss have...   Patient Difficulty: Turning over in bed (including adjusting bedclothes, sheets and blankets)?: A Little   Patient Difficulty: Sitting down on and standing up from a chair with arms (e.g., wheelchair, bedside commode, etc.): A Little   Patient Dif stairs with rail at SBA   Goal #4   Current Status Not initiated     Goal #5 Patient to demonstrate independence with home activity/exercise instructions provided to patient in preparation for discharge.    Goal #5   Current Status In progress    Goal #6   this session, son provided assist throughout with instruction and supervision from therapists. Pt performed tx and ambulation with rolling walker at MIN A with son assist. Roz Calixto follow with assist for line management from therapists.  Son educated on pr yesterday therapy activity    OBJECTIVE  Precautions:  Other (Comment) (PEG, Trach)    WEIGHT BEARING RESTRICTION    PAIN ASSESSMENT   Rating:  (no c/o pain)  Location: N/A  Management Techniques: Repositioning    BALANCE  Static Sitting: Good  Dynamic Sitt Goal #1 Patient is able to demonstrate supine - sit EOB @ level: modified independent      Goal #1   Current Status  NT, pt received up in chair   Goal #2 Patient is able to demonstrate transfers Sit to/from [facility] assistance level: supervision with wa eyewear and gloves for session. Pt donned droplet mask for session. Respiratory therapist Alex contacted and provided tracheostomy tubing and adapter to increase pt mobility outside of room.  RN arrived and held/detached pt's tube feeds temporarily for discussions with RN/CM, pt prefers to d/c home.  Secondary recommendation home with 24 hr care, HHPT/OT/SLP at d/c.     DISCHARGE RECOMMENDATIONS  PT Discharge Recommendations: 24 hour care/supervision;Home with home health PT/OT;Acute rehabilitation     PL CMS Modifier (G-Code): CK    FUNCTIONAL ABILITY STATUS  Functional Mobility/Gait Assessment  Gait Assistance: Minimum assistance (chair follow)  Distance (ft): 75' x 2  Assistive Device: Rolling walker  Pattern: Shuffle (flexed posture, slow speed)  Stai change occurs. Patient and family education have been completed. Will have [facility] OT at d/c- will continue to follow.      Colletta Keepers, OTR/L ext [phone]           Occupational Therapy Note signed by Catrina Jensen OT at 12/1/2021  4:51 PM  Version 1 of 1    Autho Stable at exit. DISCHARGE RECOMMENDATIONS  OT Discharge Recommendations: LTAC        PLAN  OT Treatment Plan: Balance activities; Energy conservation/work simplification techniques;ADL training;Functional transfer training; Endurance training;Patient/Fami : Callie Diaz OT (Occupational Therapist)       OCCUPATIONAL THERAPY TREATMENT NOTE - INPATIENT        Room Number: 480/459-D           Presenting Problem: biphasic stridor    Problem List  Principal Problem:    Biphasic stridor  Active Problems: agreeable to all tasks presented     OBJECTIVE  Precautions:  Other (Comment) (G tube, trach collar to supplemental O2)            PAIN ASSESSMENT  Ratin  Location: abdomen        ACTIVITIES OF DAILY LIVING ASSESSMENT  AM-PAC ‘6-Clicks’ Inpatient Daily Multidisciplinary Problems     Active Goals        Problem: Patient/Family Goals    Goal Priority Disciplines Outcome Interventions   Patient/Family Long Term Goal     Interdisciplinary Progressing    Description: Patient's Long Term Goal: ***    Inte

## (undated) NOTE — LETTER
[address]  [address] [place], [place]      Authorization for Surgical Operation and Procedure     Date:____4/24/2021_______                                                                                                         [facility] and allergic reactions, hemolytic reactions, transmission of diseases such as Hepatitis, AIDS and Cytomegalovirus (CMV) and fluid overload. In the event that I wish to have an autologous transfusion of my own blood, or a directed donor transfusion.   I karen recovery period ends for purposes of reinstating the DNAR order.   10. Patients having a sterilization procedure: I understand that if the procedure is successful the results will be permanent and it will therefore be impossible for me to inseminate, delmar _______________________________________________________________ _____________________________  [facility] Physician)                                                                                         (Date)                                   (Time)

## (undated) NOTE — LETTER
[facility]  Administration of Anesthesia  1. Sita Feliciano, or _________________________________ acting on her behalf, (Patient) (Dependent/Representative) request to receive anesthesia for my pending procedure/operation/treatment.   A bleeding, seizure, cardiac arrest and death. 7. AWARENESS: I understand that it is possible (but unlikely) to have explicit memory of events from the operating room while under general anesthesia.   8. ELECTROCONVULSIVE THERAPY PATIENTS: This consent serve below affirms that prior to the time of the procedure, I have explained to the patient and/or his/her guardian, the risks and benefits of undergoing anesthesia, as well as any reasonable alternatives.     ___________________________________________________

## (undated) NOTE — LETTER
P   ADULT SWALLOWING EVALUATION:   Referring Physician: Dr. Donya Lopez  Diagnosis: dysphagia   Date of Service: 8/27/2020     PATIENT SUMMARY   Evaristo Fam is a 78year old y/o female who has had a complicated course of dysphagia which began after cervi diet.  The above history was provided by chart review and also by son who accompanied Demetri Lutz to the evaluation. Problem List  Active Problems:  Active Problems:    * No active hospital problems.  *      Past Medical History  Past Medical History: Facial and Oral Structure/Appearance: WNL  Symmetry: WNL  Strength: WNL  Tone: WNL  Range of Motion: WNL  Rate of Motion: WNL    Vocal Quality: WNL  Respiration: WNL  Consistencies Trialed:  Thin liquid, Nectar liquid, Honey liquid, Puree and Hard solids  M Sincerely,  Electronically signed by therapist: Joss Meyer [address] MA/[facility]-SLP  Speech Language Pathologist  [address] [facility]  [phone] Name: Vazquez Adorno  YOB: 1940          MRN number:  [id number]

## (undated) NOTE — LETTER
[address] [facility]     I agree to have a Peripherally Inserted Central Catheter (PICC) placed in my arm.    1. The PICC insertion procedure, care, maintenance, risks, benefits, and complications have been explained to me b the PICC, including risks, benefits, and side effects related to the alternatives and risks related to not receiving this procedure.     8.  I have expressed any questions about this procedure to my physician or the PICC Proceduralist and he/she has answere